# Patient Record
Sex: FEMALE | Race: WHITE | NOT HISPANIC OR LATINO | Employment: OTHER | URBAN - METROPOLITAN AREA
[De-identification: names, ages, dates, MRNs, and addresses within clinical notes are randomized per-mention and may not be internally consistent; named-entity substitution may affect disease eponyms.]

---

## 2017-02-20 ENCOUNTER — ALLSCRIPTS OFFICE VISIT (OUTPATIENT)
Dept: OTHER | Facility: OTHER | Age: 67
End: 2017-02-20

## 2017-02-20 DIAGNOSIS — M54.12 RADICULOPATHY OF CERVICAL REGION: ICD-10-CM

## 2017-03-20 ENCOUNTER — GENERIC CONVERSION - ENCOUNTER (OUTPATIENT)
Dept: OTHER | Facility: OTHER | Age: 67
End: 2017-03-20

## 2017-03-21 ENCOUNTER — GENERIC CONVERSION - ENCOUNTER (OUTPATIENT)
Dept: OTHER | Facility: OTHER | Age: 67
End: 2017-03-21

## 2017-04-07 ENCOUNTER — GENERIC CONVERSION - ENCOUNTER (OUTPATIENT)
Dept: OTHER | Facility: OTHER | Age: 67
End: 2017-04-07

## 2017-04-07 ENCOUNTER — ALLSCRIPTS OFFICE VISIT (OUTPATIENT)
Dept: OTHER | Facility: OTHER | Age: 67
End: 2017-04-07

## 2017-04-07 LAB
ERYTHROCYTE SEDIMENTATION RATE (HISTORICAL): 5 MM/HR (ref 0–40)
HBA1C MFR BLD HPLC: 5.6 % (ref 4.8–5.6)

## 2017-04-08 LAB
ANTI-NUCLEAR ANTIBODY (ANA) (HISTORICAL): NEGATIVE
RA LATEX TURBID (HISTORICAL): <10 IU/ML (ref 0–13.9)
SSA (RO) ANTIBODY (HISTORICAL): <0.2 AI (ref 0–0.9)
SSB (LA) ANTIBODY (HISTORICAL): <0.2 AI (ref 0–0.9)
VIT B12 SERPL-MCNC: 522 PG/ML (ref 211–946)

## 2017-04-10 ENCOUNTER — GENERIC CONVERSION - ENCOUNTER (OUTPATIENT)
Dept: OTHER | Facility: OTHER | Age: 67
End: 2017-04-10

## 2017-05-26 ENCOUNTER — ALLSCRIPTS OFFICE VISIT (OUTPATIENT)
Dept: OTHER | Facility: OTHER | Age: 67
End: 2017-05-26

## 2017-08-10 ENCOUNTER — ALLSCRIPTS OFFICE VISIT (OUTPATIENT)
Dept: OTHER | Facility: OTHER | Age: 67
End: 2017-08-10

## 2017-08-10 DIAGNOSIS — E04.1 NONTOXIC SINGLE THYROID NODULE: ICD-10-CM

## 2017-08-21 LAB
A/G RATIO (HISTORICAL): 1.8 (ref 1.2–2.2)
ALBUMIN SERPL BCP-MCNC: 4.1 G/DL (ref 3.6–4.8)
ALP SERPL-CCNC: 54 IU/L (ref 39–117)
ALT SERPL W P-5'-P-CCNC: 14 IU/L (ref 0–32)
AST SERPL W P-5'-P-CCNC: 21 IU/L (ref 0–40)
BASOPHILS # BLD AUTO: 0 X10E3/UL (ref 0–0.2)
BASOPHILS # BLD AUTO: 1 %
BILIRUB SERPL-MCNC: 0.4 MG/DL (ref 0–1.2)
BUN SERPL-MCNC: 21 MG/DL (ref 8–27)
BUN/CREA RATIO (HISTORICAL): 32 (ref 12–28)
CALCIUM SERPL-MCNC: 9.9 MG/DL (ref 8.7–10.3)
CHLORIDE SERPL-SCNC: 103 MMOL/L (ref 96–106)
CO2 SERPL-SCNC: 26 MMOL/L (ref 18–29)
CREAT SERPL-MCNC: 0.65 MG/DL (ref 0.57–1)
DEPRECATED RDW RBC AUTO: 15.3 % (ref 12.3–15.4)
EGFR AFRICAN AMERICAN (HISTORICAL): 109 ML/MIN/1.73
EGFR-AMERICAN CALC (HISTORICAL): 94 ML/MIN/1.73
EOSINOPHIL # BLD AUTO: 0.1 X10E3/UL (ref 0–0.4)
EOSINOPHIL # BLD AUTO: 1 %
GLUCOSE SERPL-MCNC: 92 MG/DL (ref 65–99)
HCT VFR BLD AUTO: 39.6 % (ref 34–46.6)
HGB BLD-MCNC: 13.1 G/DL (ref 11.1–15.9)
IMM.GRANULOCYTES (CD4/8) (HISTORICAL): 0 %
IMM.GRANULOCYTES (CD4/8) (HISTORICAL): 0 X10E3/UL (ref 0–0.1)
LYMPHOCYTES # BLD AUTO: 1.4 X10E3/UL (ref 0.7–3.1)
LYMPHOCYTES # BLD AUTO: 31 %
MCH RBC QN AUTO: 27.8 PG (ref 26.6–33)
MCHC RBC AUTO-ENTMCNC: 33.1 G/DL (ref 31.5–35.7)
MCV RBC AUTO: 84 FL (ref 79–97)
MONOCYTES # BLD AUTO: 0.5 X10E3/UL (ref 0.1–0.9)
MONOCYTES (HISTORICAL): 10 %
NEUTROPHILS # BLD AUTO: 2.5 X10E3/UL (ref 1.4–7)
NEUTROPHILS # BLD AUTO: 57 %
PLATELET # BLD AUTO: 238 X10E3/UL (ref 150–379)
POTASSIUM SERPL-SCNC: 4.3 MMOL/L (ref 3.5–5.2)
RBC (HISTORICAL): 4.72 X10E6/UL (ref 3.77–5.28)
SODIUM SERPL-SCNC: 143 MMOL/L (ref 134–144)
TOT. GLOBULIN, SERUM (HISTORICAL): 2.3 G/DL (ref 1.5–4.5)
TOTAL PROTEIN (HISTORICAL): 6.4 G/DL (ref 6–8.5)
WBC # BLD AUTO: 4.4 X10E3/UL (ref 3.4–10.8)

## 2017-08-22 ENCOUNTER — GENERIC CONVERSION - ENCOUNTER (OUTPATIENT)
Dept: OTHER | Facility: OTHER | Age: 67
End: 2017-08-22

## 2017-08-22 LAB
CHOLEST SERPL-MCNC: 221 MG/DL (ref 100–199)
CHOLEST/HDLC SERPL: 2.8 RATIO UNITS (ref 0–4.4)
HDLC SERPL-MCNC: 78 MG/DL
LDLC SERPL CALC-MCNC: 130 MG/DL (ref 0–99)
TRIGL SERPL-MCNC: 63 MG/DL (ref 0–149)
TSH SERPL DL<=0.05 MIU/L-ACNC: 1.98 UIU/ML (ref 0.45–4.5)
VLDLC SERPL CALC-MCNC: 13 MG/DL (ref 5–40)

## 2017-10-12 ENCOUNTER — ALLSCRIPTS OFFICE VISIT (OUTPATIENT)
Dept: OTHER | Facility: OTHER | Age: 67
End: 2017-10-12

## 2017-10-13 NOTE — PROGRESS NOTES
Assessment  1  Cerumen impaction (380 4) (H61 20)    Plan  Cerumen impaction    · Neomycin-Polymyxin-HC 3 [id number] Otic Suspension; INSTILL 3 DROPS IN  LEFT EAR 3-4 TIMES DAILY   · How to use ear drops:; Status:Complete;   Done: 36JJG2407 11:58AM   · Never put cotton swabs inside your ears ; Status:Complete;   Done: 39LPW2847 11:58AM   · To remove earwax at home, use a soft-tipped rubber ear syringe ; Status:Complete;    Done: 82XPP4020 11:58AM   · Follow-up visit in 1 week Evaluation and Treatment  Follow-up  Status: Hold For -  Scheduling  Requested for: 15ZNY8602    Chief Complaint  Patient is here today for left ear clogged and a discussionENRRIQUE/OVIDIO      History of Present Illness  Ceruminosis (Brief): The patient is being seen for an initial evaluation of cerumen impaction  The patient is currently asymptomatic  No associated symptoms are reported  The patient is not currently being treated for this problem  Pertinent medical history:  cerumen removal, but-no diabetes,-no hearing loss,-no cholesteatoma,-no eczema,-no recurrent otitis externa-and-no tympanic membrane perforation  No risk factors have been identified  Review of Systems    Constitutional: No fever, no chills, feels well, no tiredness, no recent weight gain or loss  ENT: as noted in HPI  Cardiovascular: no complaints of slow or fast heart rate, no chest pain, no palpitations, no leg claudication or lower extremity edema  Respiratory: no complaints of shortness of breath, no wheezing, no dyspnea on exertion, no orthopnea or PND  Active Problems  1  Adult BMI 34 0-34 9 kg/sq m (V85 34) (Z68 34)   2  Arthritis (716 90) (M19 90)   3  Cervical radiculopathy (723 4) (M54 12)   4  Chronic pain of left knee (519 60,598 15) (M25 562,G89 29)   5  Congenital pes planus (754 61) (Q66 50)   6  Dysphagia, unspecified dysphagia   7  Enthesopathy of lower extremity (726 8) (M76 9)   8  Generalized muscle weakness (728 87) (M62 81)   9  Hypertension (401 9) (I10)   10  Osteoarthritis of cervical spine (721 0) (M47 812)   11  Osteoarthritis of left knee (715 96) (M17 12)   12  Overweight (278 02) (E66 3)   13  Paresthesia (782 0) (R20 2)   14  Pes planus of both feet (734) (M21 41,M21 42)   15  Plantar fasciitis (728 71) (M72 2)   16  Sciatica of left side (724 3) (M54 32)   17  Screening for colorectal cancer (V76 51) (Z12 11,Z12 12)   18  Thyroid cyst (246 2) (E04 1)   19  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)    Past Medical History  1  History of Cat bite of forearm (881 00,E906 3) (S51 859A,W55 01XA)   2  History of Difficulty in walking (719 7) (R26 2)   3  History of Enthesopathy of lower extremity (726 8) (M76 9)   4  Flu vaccine need (V04 81) (Z23)   5  History of Foot pain (729 5) (M79 673)   6  History of dizziness (V13 89) (Z87 898)   7  History of weakness (V13 89) (Z87 898)   8  Denied: History of Impacted cerumen of left ear   9  History of Peroneal tendonitis (726 79) (M76 70)   10  History of Spondylosis of cervical region without myelopathy or radiculopathy (721 0)    (M47 812)   11  History of Verruca plana (078 19) (B07 8)  Active Problems And Past Medical History Reviewed: The active problems and past medical history were reviewed and updated today  Family History  Mother    1  Family history of arthritis (V17 7) (Z82 61)   2  Family history of malignant neoplasm (V16 9) (Z80 9)   3  Family history of malignant neoplasm of esophagus (V16 0) (Z80 0)  Father    4  Family history of lung cancer (V16 1) (Z80 1)   5  Family history of malignant neoplasm (V16 9) (Z80 9)  Family History    6  Denied: FH: mental illness  Family History Reviewed: The family history was reviewed and updated today         Social History   · Active advance directive (V49 89) (Z78 9)   · Caffeine use (V49 89) (F15 90)   · Dental care, regularly   · Drinks coffee   · Never a smoker   · No alcohol use   · No drug use   · Non-smoker (V49 89) (Z78 9)   · Primary language is English  The social history was reviewed and updated today  Surgical History  1  History of Laminectomy Lumbar  Surgical History Reviewed: The surgical history was reviewed and updated today  Current Meds   1  B-12 1000 MCG Sublingual Tablet Sublingual;   Therapy: (Recorded:10Aug2017) to Recorded   2  Biotin 5000 MCG Oral Capsule; Therapy: (Recorded:74Cpq7174) to Recorded   3  Gabapentin 300 MG TABS; TAKE 1 TABLET DAILY; Therapy: (Recorded:10Aug2017) to Recorded   4  HydroCHLOROthiazide 12 5 MG Oral Capsule; TAKE ONE CAPSULE BY MOUTH   EVERY DAY IN THE MORNING; Therapy: 16PTI3355 to (Evaluate:27Rle3260)  Requested for: 97YTN5448; Last   Rx:72Ily0720 Ordered   5  Losartan Potassium 100 MG Oral Tablet; 1 EVERY MORNING; Therapy: 65NVY2315 to (Last Rx:66Syq6306)  Requested for: 72Czr2396 Ordered   6  Ocuvite TABS; TAKE 1 TABLET DAILY; Therapy: (Selina Ardon) to Recorded   7  Pantoprazole Sodium 40 MG Oral Tablet Delayed Release; TAKE 1 TABLET 30   MINUTES BEFORE BREAKFAST DAILY; Therapy: 55Yeg7060 to Recorded   8  Vitamin D 2000 UNIT Oral Tablet; Take 1 tablet daily; Therapy: (Recorded:29Jun2015) to Recorded    The medication list was reviewed and updated today  Allergies  1  Augmentin   2  erythromycin  3  No Known Environmental Allergies   4  No Known Food Allergies  Denied    5  amoxicillin    Vitals   Recorded: 29EOY2218 11:15AM   Temperature 97 7 F, Temporal   Heart Rate 60, L Radial   Pulse Quality Normal, L Radial   Respiration Quality Normal   Respiration 16   Systolic 740, LUE, Sitting   Diastolic 80, LUE, Sitting   Height 5 ft 2 5 in   Weight 182 lb    BMI Calculated 32 76   BSA Calculated 1 85   O2 Saturation 97     Physical Exam    Constitutional   General appearance: No acute distress, well appearing and well nourished      Ears, Nose, Mouth, and Throat   External inspection of ears and nose: Normal     Otoscopic examination: Abnormal  -Cerumen impaction left ear canal    Nasal mucosa, septum, and turbinates: Normal without edema or erythema  Pulmonary   Respiratory effort: No increased work of breathing or signs of respiratory distress  Results/Data  Falls Risk Assessment (Dx Z13 89 Screen for Neurologic Disorder) 12Oct2017 11:22AM User, [employer]     Test Name Result Flag Reference   Falls Risk      No falls in the past year     PHQ-2 Adult Depression Screening 12Oct2017 11:20AM User, [employer]     Test Name Result Flag Reference   PHQ-2 Adult Depression Score 2     Over the last two weeks, how often have you been bothered by any of the following problems?   Little interest or pleasure in doing things: Several days - 1  Feeling down, depressed, or hopeless: Several days - 1   PHQ-2 Adult Depression Screening Negative         Signatures   Electronically signed by : Randee Jones MD; Oct 12 2017 12:00PM EST                       (Author)

## 2017-10-20 ENCOUNTER — GENERIC CONVERSION - ENCOUNTER (OUTPATIENT)
Dept: OTHER | Facility: OTHER | Age: 67
End: 2017-10-20

## 2017-11-22 ENCOUNTER — ALLSCRIPTS OFFICE VISIT (OUTPATIENT)
Dept: OTHER | Facility: OTHER | Age: 67
End: 2017-11-22

## 2017-11-22 LAB
BILIRUB UR QL STRIP: NORMAL
CLARITY UR: NORMAL
COLOR UR: YELLOW
GLUCOSE (HISTORICAL): NORMAL
HGB UR QL STRIP.AUTO: NORMAL
KETONES UR STRIP-MCNC: NORMAL MG/DL
LEUKOCYTE ESTERASE UR QL STRIP: NORMAL
NITRITE UR QL STRIP: NORMAL
PH UR STRIP.AUTO: 7 [PH]
PROT UR STRIP-MCNC: NORMAL MG/DL
SP GR UR STRIP.AUTO: 1.01
UROBILINOGEN UR QL STRIP.AUTO: NORMAL

## 2017-11-23 NOTE — PROGRESS NOTES
Assessment    1  Atrophic vaginitis (627 3) (N95 2)    Plan  Atrophic vaginitis    · Follow-up visit in 3 months Evaluation and Treatment  Follow-up  Status: Hold For -Scheduling  Requested for: 83QYC4511   · Obstetrics/Gynecology Follow Up Evaluation and Treatment  Follow-up  Status: Hold For -Scheduling  Requested for: 27FHO0151   · Eat foods that are high in calcium ; Status:Complete;   Done: 42KAH9513 10:26AM   · Use a water soluble lubricant to make sexual intercourse comfortable ; Status:Complete;  Done: 45ONA8064 10:26AM   · Call [phone] if: You have any bleeding from the vagina ; Status:Complete;  Done: 56HQW4289 10:26AM  Need for pneumococcal vaccination    · Prevnar 13 Intramuscular Suspension    Chief Complaint  Patient is here today for uti symptoms, L Nobles/LPN      History of Present Illness  HPI: Patient presents with urethral irritation  Review of Systems   Constitutional: No fever, no chills, feels well, no tiredness, no recent weight gain or loss  Cardiovascular: no complaints of slow or fast heart rate, no chest pain, no palpitations, no leg claudication or lower extremity edema  Genitourinary: as noted in HPI  Active Problems  1  Adult BMI 34 0-34 9 kg/sq m (V85 34) (Z68 34)   2  Arthritis (716 90) (M19 90)   3  Cerumen impaction (380 4) (H61 20)   4  Cervical radiculopathy (723 4) (M54 12)   5  Chronic pain of left knee (160 40,814 01) (M25 562,G89 29)   6  Congenital pes planus (754 61) (Q66 50)   7  Dysphagia, unspecified dysphagia   8  Enthesopathy of lower extremity (726 8) (M76 9)   9  Generalized muscle weakness (728 87) (M62 81)   10  Hypertension (401 9) (I10)   11  Denied: History of Impacted cerumen of left ear   12  Osteoarthritis of cervical spine (721 0) (M47 812)   13  Osteoarthritis of left knee (715 96) (M17 12)   14  Overweight (278 02) (E66 3)   15  Paresthesia (782 0) (R20 2)   16  Pes planus of both feet (734) (M21 41,M21 42)   17   Plantar fasciitis (728 71) (M72 2)   18  Sciatica of left side (724 3) (M54 32)   19  Screening for colorectal cancer (V76 51) (Z12 11,Z12 12)   20  Thyroid cyst (246 2) (E04 1)   21  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)    Past Medical History    1  History of Cat bite of forearm (881 00,E906 3) (S51 859A,W55 01XA)   2  History of Difficulty in walking (719 7) (R26 2)   3  History of Enthesopathy of lower extremity (726 8) (M76 9)   4  Flu vaccine need (V04 81) (Z23)   5  History of Foot pain (729 5) (M79 673)   6  History of dizziness (V13 89) (Z87 898)   7  History of impacted cerumen (V12 49) (Z86 69)   8  History of weakness (V13 89) (Z87 898)   9  Denied: History of Impacted cerumen of left ear   10  History of Peroneal tendonitis (726 79) (M76 70)   11  History of Spondylosis of cervical region without myelopathy or radiculopathy (721 0)  (M47 812)   12  History of Verruca plana (078 19) (B07 8)  Active Problems And Past Medical History Reviewed: The active problems and past medical history were reviewed and updated today  Family History  Mother    1  Family history of arthritis (V17 7) (Z82 61)   2  Family history of malignant neoplasm (V16 9) (Z80 9)   3  Family history of malignant neoplasm of esophagus (V16 0) (Z80 0)  Father    4  Family history of lung cancer (V16 1) (Z80 1)   5  Family history of malignant neoplasm (V16 9) (Z80 9)  Family History    6  Denied: Family history of substance abuse   7  Denied: FH: mental illness  Family History Reviewed: The family history was reviewed and updated today  Social History   · Active advance directive (V49 89) (Z78 9)   · Caffeine use (V49 89) (F15 90)   · Dental care, regularly   · Drinks coffee   · Never a smoker   · No alcohol use   · No drug use   · Non-smoker (V49 89) (Z78 9)   · Primary language is English  The social history was reviewed and updated today  Surgical History    1   History of Laminectomy Lumbar  Surgical History Reviewed: The surgical history was reviewed and updated today  Current Meds   1  B-12 1000 MCG Sublingual Tablet Sublingual; Therapy: (Recorded:10Aug2017) to Recorded   2  Biotin 5000 MCG Oral Capsule; Therapy: (Recorded:24Gdh7343) to Recorded   3  Gabapentin 300 MG TABS; TAKE 1 TABLET DAILY; Therapy: (Recorded:10Aug2017) to Recorded   4  HydroCHLOROthiazide 12 5 MG Oral Capsule; TAKE ONE CAPSULE BY MOUTH EVERY DAY IN THE MORNING; Therapy: 18OQS3482 to (Evaluate:11Feb2018)  Requested for: 55JZE9027; Last Rx:30Fby3605 Ordered   5  Losartan Potassium 100 MG Oral Tablet; 1 EVERY MORNING; Therapy: 94ZHM7595 to (Last Rx:74Fgx4633)  Requested for: 11Tlx4646 Ordered   6  Neomycin-Polymyxin-HC 3 5-76115-0 Otic Suspension; INSTILL 3 DROPS IN LEFT EAR 3-4 TIMES DAILY; Therapy: 85SQE4748 to (Evaluate:17Oct2017)  Requested for: 17XTZ7916; Last Rx:12Oct2017 Ordered   7  Ocuvite TABS; TAKE 1 TABLET DAILY; Therapy: (Marcos Del Toro) to Recorded   8  Pantoprazole Sodium 40 MG Oral Tablet Delayed Release; TAKE 1 TABLET 30 MINUTES BEFORE BREAKFAST DAILY; Therapy: 24Apr2014 to Recorded   9  Vitamin D 2000 UNIT Oral Tablet; Take 1 tablet daily; Therapy: (Recorded:29Jun2015) to Recorded    The medication list was reviewed and updated today  Allergies  1  Augmentin   2  erythromycin  3  No Known Environmental Allergies   4  No Known Food Allergies  Denied    5  amoxicillin    Vitals   Recorded: 22Nov2017 09:54AM   Temperature 98 5 F, Temporal   Heart Rate 60, R Radial   Pulse Quality Normal, R Radial   Respiration Quality Normal   Respiration 16   Systolic 370, LUE, Sitting   Diastolic 80, LUE, Sitting   Height 5 ft 2 5 in   Weight 174 lb    BMI Calculated 31 32   BSA Calculated 1 81   O2 Saturation 98       Physical Exam   Constitutional  General appearance: No acute distress, well appearing and well nourished  Pulmonary  Respiratory effort: No increased work of breathing or signs of respiratory distress     Neurologic Cranial nerves: Cranial nerves 2-12 intact  Psychiatric  Orientation to person, place, and time: Normal    Mood and affect: Normal    Additional Exam:  No CVAT  U/A negative          Signatures   Electronically signed by : Ted Gu MD; Nov 22 2017 10:28AM EST                       (Author)

## 2018-01-09 ENCOUNTER — ALLSCRIPTS OFFICE VISIT (OUTPATIENT)
Dept: OTHER | Facility: OTHER | Age: 68
End: 2018-01-09

## 2018-01-09 NOTE — RESULT NOTES
Verified Results  (1) CBC/PLT/DIFF 80Qfw9028 07:55AM Meldon Emperatriz     Test Name Result Flag Reference   WBC 4 4 x10E3/uL  3 4-10 8   RBC 4 72 x10E6/uL  3 77-5 28   Hemoglobin 13 1 g/dL  11 1-15 9   Hematocrit 39 6 %  34 0-46  6   MCV 84 fL  79-97   MCH 27 8 pg  26 6-33 0   MCHC 33 1 g/dL  31 5-35 7   RDW 15 3 %  12 3-15 4   Platelets 957 Y16X7/ZF  150-379   Neutrophils 57 %     Lymphs 31 %     Monocytes 10 %     Eos 1 %     Basos 1 %     Neutrophils (Absolute) 2 5 x10E3/uL  1 4-7 0   Lymphs (Absolute) 1 4 x10E3/uL  0 7-3 1   Monocytes(Absolute) 0 5 x10E3/uL  0 1-0 9   Eos (Absolute) 0 1 x10E3/uL  0 0-0 4   Baso (Absolute) 0 0 x10E3/uL  0 0-0 2   Immature Granulocytes 0 %     Immature Grans (Abs) 0 0 x10E3/uL  0 0-0 1     (1) COMPREHENSIVE METABOLIC PANEL 07PHX6589 72:63ON Meldon Emperatriz     Test Name Result Flag Reference   Glucose, Serum 92 mg/dL  65-99   BUN 21 mg/dL  8-27   Creatinine, Serum 0 65 mg/dL  0 57-1 00   BUN/Creatinine Ratio 32 H 12-28   Sodium, Serum 143 mmol/L  134-144   Potassium, Serum 4 3 mmol/L  3 5-5 2   Chloride, Serum 103 mmol/L     Carbon Dioxide, Total 26 mmol/L  18-29   Calcium, Serum 9 9 mg/dL  8 7-10 3   Protein, Total, Serum 6 4 g/dL  6 0-8 5   Albumin, Serum 4 1 g/dL  3 6-4 8   Globulin, Total 2 3 g/dL  1 5-4 5   A/G Ratio 1 8  1 2-2 2   Bilirubin, Total 0 4 mg/dL  0 0-1 2   Alkaline Phosphatase, S 54 IU/L     AST (SGOT) 21 IU/L  0-40   ALT (SGPT) 14 IU/L  0-32   eGFR If NonAfricn Am 94 mL/min/1 73  >59   eGFR If Africn Am 109 mL/min/1 73  >59     (1) LIPID PANEL, FASTING 59Nso1145 07:55AM Stephaniepedro HazelEmperatriz     Test Name Result Flag Reference   Cholesterol, Total 221 mg/dL H 100-199   Triglycerides 63 mg/dL  0-149   HDL Cholesterol 78 mg/dL  >39   VLDL Cholesterol Rick 13 mg/dL  5-40   LDL Cholesterol Calc 130 mg/dL H 0-99   T  Chol/HDL Ratio 2 8 ratio units  0 0-4 4   T   Chol/HDL Ratio                                                             Men  Women 1/2 Avg  Risk  3 4    3 3                                                   Avg Risk  5 0    4 4                                                2X Avg  Risk  9 6    7 1                                                3X Avg  Risk 23 4   11 0     (1) TSH 12Hij6666 07:55AM Brennon Henderson     Test Name Result Flag Reference   TSH 1 980 uIU/mL  0 450-4 500

## 2018-01-10 NOTE — RESULT NOTES
Message   Please notify lyme neg  FS     Verified Results  (LC) Lyme, Western Blot, Serum 96Vtl7880 11:17AM Clotilda Lack     Test Name Result Flag Reference   IgG P93 Ab  Absent     IgG P66 Ab  Absent     IgG P58 Ab  Absent     IgG P45 Ab  Absent     IgG P41 Ab  Present A    IgG P39 Ab  Absent     IgG P30 Ab  Present A    IgG P28 Ab  Absent     IgG P23 Ab  Absent     IgG P18 Ab  Absent     Lyme IgG WB Interp  Negative     Positive: 5 of the following                                                Borrelia-specific bands:                                                18,23,28,30,39,41,45,58,                                                66, and 93  Negative: No bands or banding                                                patterns which do not                                                meet positive criteria  IgM P41 Ab  Absent     IgM P39 Ab  Absent     IgM P23 Ab  Absent     Lyme IgM WB Interp  Negative     Note: An equivocal or positive EIA result followed by a negative  Western Blot result is considered NEGATIVE  An equivocal or positive  EIA result followed by a positive Western Blot is considered POSITIVE  by the CDC  Positive: 2 of the following bands: 23,39 or 41  Negative: No bands or banding patterns which do not meet positive  criteria  Criteria for positivity are those recommended by CDC/ASTPHLD   p23=Osp C, u18=cosafoqyi  Note:  Sera from individuals with the following may cross react in the  Lyme Western Blot assays: other spirochetal diseases (periodontal  disease, leptospirosis, relapsing fever, yaws, and pinta);  connective autoimmune (Rheumatoid Arthritis and Systemic Lupus  Erythematosus and also individuals with Antinuclear Antibody);  other infections COFFEE [facility] Spotted Fever; Jean-Pierre-Barr Virus,  and Cytomegalovirus)

## 2018-01-10 NOTE — PROGRESS NOTES
Assessment   1  Cervical radiculopathy (723 4) (M54 12)    Plan   Cervical radiculopathy    · Follow-up visit in 2 months Evaluation and Treatment  Follow-up  Status: Hold For -    Scheduling  Requested for: 74MQU1622   · After 3 days, apply heat 4 times a day for 20 minutes ; Status:Complete;   Done:    03DNV3941 02:53PM   · Rub ice on the affected area 4 times a day for 15 minutes for the first 2 days ;    Status:Complete;   Done: 52ZLA7891 02:53PM   · Call [phone] if: The pain is not better in 1 week ; Status:Complete;   Done:    77ETO4973 02:53PM   · Call [phone] if: The symptoms are not better in 7 days ; Status:Complete;   Done:    77STJ5630 02:53PM   · Call [phone] if: You get a rash ; Status:Complete;   Done: 53PAJ7097 02:53PM   · Seek Immediate Medical Attention if: Your arm becomes weak ; Status:Complete;      Done: 41WPB0128 02:53PM   · Seek Immediate Medical Attention if: Your arm is numb, cold, or tingling ;    Status:Complete;   Done: 16DWC2533 02:53PM    Chief Complaint   Patient is here today for burning in her arms and hands after getting shots in her cervical spine  Also she had a lapse in her memory, L  Nobles/LPN      History of Present Illness   Radiculopathy, Cervical (Brief): The patient is being seen for a routine clinic follow-up of cervical radiculopathy  This condition is not related to a specific injury  Past evaluation has included neck MRI  Past treatment has included systemic corticosteroids  The patient denies having recent interventions  Symptoms:  neck pain  The patient is currently asymptomatic  Current treatment includes Trigger point injections  By report there is good compliance with treatment  Review of Systems        Constitutional: No fever, no chills, feels well, no tiredness, no recent weight gain or loss  Musculoskeletal: as noted in HPI        Neurological: no complaints of headache, no confusion, no numbness or tingling, no dizziness or fainting  Active Problems   1  Adult BMI 34 0-34 9 kg/sq m (V85 34) (Z68 34)   2  Arthritis (716 90) (M19 90)   3  Atrophic vaginitis (627 3) (N95 2)   4  Cerumen impaction (380 4) (H61 20)   5  Cervical radiculopathy (723 4) (M54 12)   6  Chronic pain of left knee (328 16,562 54) (M25 562,G89 29)   7  Congenital pes planus (754 61) (Q66 50)   8  Dysphagia, unspecified dysphagia   9  Enthesopathy of lower extremity (726 8) (M76 9)   10  Generalized muscle weakness (728 87) (M62 81)   11  Hypertension (401 9) (I10)   12  Denied: History of Impacted cerumen of left ear   13  Need for pneumococcal vaccination (V03 82) (Z23)   14  Osteoarthritis of cervical spine (721 0) (M47 812)   15  Osteoarthritis of left knee (715 96) (M17 12)   16  Overweight (278 02) (E66 3)   17  Paresthesia (782 0) (R20 2)   18  Pes planus of both feet (734) (M21 41,M21 42)   19  Plantar fasciitis (728 71) (M72 2)   20  Sciatica of left side (724 3) (M54 32)   21  Screening for colorectal cancer (V76 51) (Z12 11,Z12 12)   22  Thyroid cyst (246 2) (E04 1)   23  UTI symptoms (788 99) (R39 9)   24  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)    Past Medical History   1  History of Cat bite of forearm (881 00,E906 3) (S51 859A,W55 01XA)   2  History of Difficulty in walking (719 7) (R26 2)   3  History of Enthesopathy of lower extremity (726 8) (M76 9)   4  Flu vaccine need (V04 81) (Z23)   5  History of Foot pain (729 5) (M79 673)   6  History of dizziness (V13 89) (Z87 898)   7  History of impacted cerumen (V12 49) (Z86 69)   8  History of weakness (V13 89) (Z87 898)   9  Denied: History of Impacted cerumen of left ear   10  History of Peroneal tendonitis (726 79) (M76 70)   11  History of Spondylosis of cervical region without myelopathy or radiculopathy (721 0)      (M47 812)   12  History of Verruca plana (250 19) (B07 8)  Active Problems And Past Medical History Reviewed:     The active problems and past medical history were reviewed and updated today  Family History   Mother    1  Family history of arthritis (V17 7) (Z82 61)   2  Family history of malignant neoplasm (V16 9) (Z80 9)   3  Family history of malignant neoplasm of esophagus (V16 0) (Z80 0)  Father    4  Family history of lung cancer (V16 1) (Z80 1)   5  Family history of malignant neoplasm (V16 9) (Z80 9)  Family History    6  Denied: Family history of substance abuse   7  Denied: FH: mental illness  Family History Reviewed: The family history was reviewed and updated today  Social History    · Active advance directive (V49 89) (Z78 9)   · Caffeine use (V49 89) (F15 90)   · Dental care, regularly   · Drinks coffee   · Never a smoker   · No alcohol use   · No drug use   · Non-smoker (V49 89) (Z78 9)   · Primary language is English  The social history was reviewed and updated today  Surgical History   1  History of Laminectomy Lumbar  Surgical History Reviewed: The surgical history was reviewed and updated today  Current Meds    1  B-12 1000 MCG Sublingual Tablet Sublingual;     Therapy: (Recorded:10Aug2017) to Recorded   2  Gabapentin 300 MG TABS; Take 1 tablet twice daily; Therapy: (Recorded:09Jan2018) to Recorded   3  HydroCHLOROthiazide 12 5 MG Oral Capsule; TAKE ONE CAPSULE BY MOUTH     EVERY DAY IN THE MORNING; Therapy: 14HLP7108 to (Evaluate:73Vrp7282)  Requested for: 35JNE5851; Last     Rx:66Uap2923 Ordered   4  Losartan Potassium 100 MG Oral Tablet; 1 EVERY MORNING; Therapy: 84UMZ3677 to (Last Rx:59Wix7424)  Requested for: 72Xky9658 Ordered   5  Neomycin-Polymyxin-HC 3 5-33639-1 Otic Suspension; INSTILL 3 DROPS IN LEFT     EAR 3-4 TIMES DAILY; Therapy: 24QTV0883 to (Evaluate:17Oct2017)  Requested for: 34JPC8347; Last     Rx:12Oct2017 Ordered   6  Ocuvite TABS; TAKE 1 TABLET DAILY; Therapy: (Candi Mercado) to Recorded   7   Pantoprazole Sodium 40 MG Oral Tablet Delayed Release; TAKE 1 TABLET 30 MINUTES BEFORE BREAKFAST DAILY; Therapy: 24Apr2014 to Recorded   8  Vitamin D 2000 UNIT Oral Tablet; Take 1 tablet daily; Therapy: (Recorded:29Jun2015) to Recorded     The medication list was reviewed and updated today  Allergies   1  amoxicillin   2  Augmentin   3  erythromycin  4  No Known Environmental Allergies   5  No Known Food Allergies    Vitals    Recorded: 45OBX4285 02:22PM   Temperature 98 1 F, Temporal   Heart Rate 70, R Radial   Pulse Quality Normal, R Radial   Respiration Quality Normal   Respiration 16   Systolic 178, LUE, Sitting   Diastolic 80, LUE, Sitting   Height 5 ft 2 in   Weight 160 lb    BMI Calculated 29 26   BSA Calculated 1 74   O2 Saturation 98     Physical Exam        Constitutional      General appearance: No acute distress, well appearing and well nourished  Musculoskeletal      Gait and station: Normal        Digits and nails: Normal without clubbing or cyanosis  Inspection/palpation of joints, bones, and muscles: Normal        Neurologic      Reflexes: 2+ and symmetric  Sensation: No sensory loss         Psychiatric      Orientation to person, place, and time: Normal        Mood and affect: Normal           Signatures    Electronically signed by : Guru Doll MD; Jan 9 2018  2:55PM EST                       (Author)

## 2018-01-10 NOTE — PROGRESS NOTES
Assessment    1  Thyroid cyst (246 2) (E04 1)   2  Encounter for preventive health examination (V70 0) (Z00 00)   3  History of Spondylosis of cervical region without myelopathy or radiculopathy (721 0)   (M47 812)   4  Adult BMI 34 0-34 9 kg/sq m (V85 34) (Z68 34)    Plan  Cervical radiculopathy, Congenital pes planus, Health Maintenance, Generalized muscle  weakness    · (1) CBC/PLT/DIFF; Status:Active; Requested for:04Nsm1712; Health Maintenance    · (1) COMPREHENSIVE METABOLIC PANEL; Status:Active; Requested for:95Tgz8334;    · Follow-up visit in 1 year Evaluation and Treatment  Follow-up  Status: Hold For -  Scheduling  Requested for: 74ZUP5210   · Always use a seat belt and shoulder strap when riding or driving a motor vehicle ;  Status:Complete;   Done: 98TCK0795 01:49PM   · Begin a limited exercise program ; Status:Complete;   Done: 13KQR9986 01:49PM   · Use a sun block product with an SPF of 15 or more ; Status:Complete;   Done:  72EMN7288 01:49PM   · We recommend that you follow the "Mediterranean diet "; Status:Complete;   Done:  73DTJ6459 01:49PM  Health Maintenance, Hypertension    · (1) LIPID PANEL, FASTING; Status:Active; Requested for:10Aug2017; Health Maintenance, Thyroid cyst    · (1) TSH; Status:Active; Requested for:10Aug2017; Thyroid cyst    · US THYROID; Status:Hold For - Scheduling; Requested for:10Aug2017;     Discussion/Summary  health maintenance visit Currently, she eats a healthy diet  cervical cancer screening is current Breast cancer screening: mammogram is current  Colorectal cancer screening: colorectal cancer screening is current  Osteoporosis screening: bone mineral density testing is not indicated  Screening lab work includes hemoglobin, glucose, lipid profile and thyroid function testing  The immunizations are up to date  Advice and education were given regarding aerobic exercise, sunscreen use and seat belt use        Chief Complaint  Patient is here today for her annual physical exam, L  Nobles/LPN      History of Present Illness  HM, Adult Female: The patient is being seen for a health maintenance evaluation  The last health maintenance visit was 12 month(s) ago  Social History: She is unmarried  Work status: Retired  The patient has never smoked cigarettes  She reports never drinking alcohol  She has never used illicit drugs  General Health: The patient's health since the last visit is described as good  She has regular dental visits  She denies vision problems  She denies hearing loss  Immunizations status: up to date  Lifestyle:  She consumes a diverse and healthy diet  She does not exercise regularly  She does not use tobacco  She denies alcohol use  She denies drug use  Reproductive health: the patient is postmenopausal    Screening: Cervical cancer screening includes a pap smear performed last year  Colorectal cancer screening includes a colonoscopy performed within the past ten years  Metabolic screening includes lipid profile performed within the past five years, glucose screening performed last year and thyroid function test performed last year  Cardiovascular risk factors: hypertension, but no diabetes, no high LDL cholesterol, no low HDL cholesterol, no stress, no obesity, no tobacco use, no illicit drug use and no sedentary lifestyle  General health risks: no previous breast cancer, no previous colon polyps, no inflammatory bowel disease, no osteoporosis risk factors, no asbestos exposure and no radiation exposure  Safety elements used: seat belt, smoke detector and carbon monoxide detector  Review of Systems    Constitutional: No fever, no chills, feels well, no tiredness, no recent weight gain or weight loss  Eyes: No complaints of eye pain, no red eyes, no eyesight problems, no discharge, no dry eyes, no itching of eyes  ENT: no complaints of earache, no loss of hearing, no nose bleeds, no nasal discharge, no sore throat, no hoarseness  Cardiovascular: No complaints of slow heart rate, no fast heart rate, no chest pain, no palpitations, no leg claudication, no lower extremity edema  Respiratory: No complaints of shortness of breath, no wheezing, no cough, no SOB on exertion, no orthopnea, no PND  Gastrointestinal: No complaints of abdominal pain, no constipation, no nausea or vomiting, no diarrhea, no bloody stools  Genitourinary: No complaints of dysuria, no incontinence, no pelvic pain, no dysmenorrhea, no vaginal discharge or bleeding  Musculoskeletal: No complaints of arthralgias, no myalgias, no joint swelling or stiffness, no limb pain or swelling  Integumentary: No complaints of skin rash or lesions, no itching, no skin wounds, no breast pain or lump  Neurological: No complaints of headache, no confusion, no convulsions, no numbness, no dizziness or fainting, no tingling, no limb weakness, no difficulty walking  Psychiatric: Not suicidal, no sleep disturbance, no anxiety or depression, no change in personality, no emotional problems  Endocrine: No complaints of proptosis, no hot flashes, no muscle weakness, no deepening of the voice, no feelings of weakness  Hematologic/Lymphatic: No complaints of swollen glands, no swollen glands in the neck, does not bleed easily, does not bruise easily  Active Problems    1  Arthritis (716 90) (M19 90)   2  Cervical radiculopathy (723 4) (M54 12)   3  Chronic pain of left knee (792 19,673 48) (M25 562,G89 29)   4  Congenital pes planus (754 61) (Q66 50)   5  Dysphagia, unspecified dysphagia   6  Enthesopathy of lower extremity (726 8) (M76 9)   7  Generalized muscle weakness (728 87) (M62 81)   8  Hypertension (401 9) (I10)   9  Osteoarthritis of cervical spine (721 0) (M47 812)   10  Osteoarthritis of left knee (715 96) (M17 12)   11  Overweight (278 02) (E66 3)   12  Paresthesia (782 0) (R20 2)   13  Pes planus of both feet (734) (M21 41,M21 42)   14   Plantar fasciitis (728 71) (M72 2)   15  Sciatica of left side (724 3) (M54 32)   16  Screening for colorectal cancer (V76 51) (Z12 11,Z12 12)   17  Thyroid cyst (246 2) (E04 1)   18  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)    Past Medical History    · History of Cat bite of forearm (881 00,E906 3) (S51 859A,W55 01XA)   · History of Difficulty in walking (719 7) (R26 2)   · History of Enthesopathy of lower extremity (726 8) (M76 9)   · History of Foot pain (729 5) (M79 673)   · History of dizziness (V13 89) (F00 312)   · History of weakness (V13 89) (P50 208)   · History of Peroneal tendonitis (726 79) (M76 70)   · History of Spondylosis of cervical region without myelopathy or radiculopathy (721 0)  (M47 812)   · History of Verruca plana (078 19) (B07 8)    Surgical History    · History of Laminectomy Lumbar    Family History  Mother    · Family history of arthritis (V17 7) (Z82 61)   · Family history of malignant neoplasm (V16 9) (Z80 9)   · Family history of malignant neoplasm of esophagus (V16 0) (Z80 0)  Father    · Family history of lung cancer (V16 1) (Z80 1)   · Family history of malignant neoplasm (V16 9) (Z80 9)  Family History    · Denied: FH: mental illness    Social History    · Caffeine use (V49 89) (F15 90)   · Dental care, regularly   · Drinks coffee   · Never a smoker   · No alcohol use   · No drug use   · Non-smoker (V49 89) (Z78 9)   · Primary language is English    Current Meds   1  B-12 1000 MCG Sublingual Tablet Sublingual;   Therapy: (Recorded:99Cpv7777) to Recorded   2  Biotin 5000 MCG Oral Capsule; Therapy: (Recorded:09Yoh5753) to Recorded   3  Gabapentin 300 MG TABS; TAKE 1 TABLET DAILY; Therapy: (Recorded:69Kgx1013) to Recorded   4  HydroCHLOROthiazide 12 5 MG Oral Capsule; TAKE ONE CAPSULE BY MOUTH   EVERY DAY IN THE MORNING; Therapy: 36XPS0166 to (Evaluate:02Ohn8874)  Requested for: 61SLI4667; Last   Rx:17Svc9952 Ordered   5   Losartan Potassium 100 MG Oral Tablet; 1 EVERY MORNING; Therapy: 88TXE0819 to (Last Rx:22Wds1519)  Requested for: 25Oup6275 Ordered   6  Ocuvite TABS; TAKE 1 TABLET DAILY; Therapy: (Sarah Luna) to Recorded   7  Pantoprazole Sodium 40 MG Oral Tablet Delayed Release; TAKE 1 TABLET 30   MINUTES BEFORE BREAKFAST DAILY; Therapy: 24Apr2014 to Recorded   8  Vitamin D 2000 UNIT Oral Tablet; Take 1 tablet daily; Therapy: (Recorded:29Jun2015) to Recorded    Allergies    1  Augmentin   2  erythromycin    3  No Known Environmental Allergies   4  No Known Food Allergies  Denied    5  amoxicillin    Vitals   Recorded: 10Aug2017 01:02PM   Temperature 97 8 F, Temporal   Heart Rate 74, R Radial   Pulse Quality Normal, R Radial   Respiration Quality Normal   Respiration 16   Systolic 735, LUE, Sitting   Diastolic 68, LUE, Sitting   Height 5 ft 3 5 in   Weight 197 lb    BMI Calculated 34 35   BSA Calculated 1 93   O2 Saturation 97     Physical Exam    Constitutional   General appearance: No acute distress, well appearing and well nourished  Eyes   Conjunctiva and lids: No swelling, erythema or discharge  Pupils and irises: Equal, round, reactive to light  Ophthalmoscopic examination: Normal fundi and optic discs  Ears, Nose, Mouth, and Throat   External inspection of ears and nose: Normal     Otoscopic examination: Tympanic membranes translucent with normal light reflex  Canals patent without erythema  Hearing: Normal     Nasal mucosa, septum, and turbinates: Normal without edema or erythema  Lips, teeth, and gums: Normal, good dentition  Oropharynx: Normal with no erythema, edema, exudate or lesions  Neck   Neck: Supple, symmetric, trachea midline, no masses  Thyroid: Normal, no thyromegaly  Pulmonary   Respiratory effort: No increased work of breathing or signs of respiratory distress  Auscultation of lungs: Clear to auscultation  Cardiovascular   Auscultation of heart: Normal rate and rhythm, normal S1 and S2, no murmurs      Carotid pulses: 2+ bilaterally  Abdominal aorta: Normal     Examination of extremities for edema and/or varicosities: Abnormal   Varicosities  Abdomen   Abdomen: Non-tender, no masses  Liver and spleen: No hepatomegaly or splenomegaly  Lymphatic   Palpation of lymph nodes in neck: No lymphadenopathy  Musculoskeletal   Gait and station: Normal     Digits and nails: Normal without clubbing or cyanosis  Joints, bones, and muscles: Normal     Range of motion: Normal     Stability: Normal     Muscle strength/tone: Normal     Skin   Skin and subcutaneous tissue: Normal without rashes or lesions  Palpation of skin and subcutaneous tissue: Normal turgor  Neurologic   Cranial nerves: Cranial nerves II-XII intact  Reflexes: 2+ and symmetric  Sensation: No sensory loss  Psychiatric   Judgment and insight: Normal     Orientation to person, place, and time: Normal     Recent and remote memory: Intact  Mood and affect: Normal        Health Management  Screening for colorectal cancer   COLONOSCOPY; every 7 years; Last 23Apr2014; Next Due: 76WEM4706; Active  Health Maintenance   * MAMMO SCREENING BILATERAL W CAD; every 1 year; Last 89VOR9079; Next Due:  21Mar2018;  Active    Signatures   Electronically signed by : Gualberto Delgado MD; Aug 10 2017  1:55PM EST                       (Author)

## 2018-01-12 VITALS
OXYGEN SATURATION: 97 % | HEART RATE: 74 BPM | DIASTOLIC BLOOD PRESSURE: 68 MMHG | HEIGHT: 64 IN | BODY MASS INDEX: 33.63 KG/M2 | RESPIRATION RATE: 16 BRPM | TEMPERATURE: 97.8 F | WEIGHT: 197 LBS | SYSTOLIC BLOOD PRESSURE: 114 MMHG

## 2018-01-12 VITALS
WEIGHT: 174 LBS | RESPIRATION RATE: 16 BRPM | HEART RATE: 60 BPM | BODY MASS INDEX: 30.83 KG/M2 | HEIGHT: 63 IN | SYSTOLIC BLOOD PRESSURE: 130 MMHG | DIASTOLIC BLOOD PRESSURE: 80 MMHG | TEMPERATURE: 98.5 F | OXYGEN SATURATION: 98 %

## 2018-01-12 NOTE — RESULT NOTES
Verified Results  (1) HEMOGLOBIN A1C 07Apr2017 11:53AM Danitza Quintanilla     Test Name Result Flag Reference   Hemoglobin A1c 5 6 %  4 8-5 6   Pre-diabetes: 5 7 - 6 4           Diabetes: >6 4           Glycemic control for adults with diabetes: <7 0

## 2018-01-13 VITALS
SYSTOLIC BLOOD PRESSURE: 132 MMHG | HEIGHT: 62 IN | TEMPERATURE: 97.8 F | RESPIRATION RATE: 16 BRPM | DIASTOLIC BLOOD PRESSURE: 76 MMHG | HEART RATE: 68 BPM | WEIGHT: 204 LBS | BODY MASS INDEX: 37.54 KG/M2

## 2018-01-13 VITALS
HEIGHT: 62 IN | RESPIRATION RATE: 16 BRPM | BODY MASS INDEX: 37.91 KG/M2 | DIASTOLIC BLOOD PRESSURE: 76 MMHG | WEIGHT: 206 LBS | SYSTOLIC BLOOD PRESSURE: 125 MMHG | HEART RATE: 75 BPM

## 2018-01-13 VITALS
TEMPERATURE: 98 F | DIASTOLIC BLOOD PRESSURE: 96 MMHG | SYSTOLIC BLOOD PRESSURE: 166 MMHG | BODY MASS INDEX: 37.91 KG/M2 | WEIGHT: 206 LBS | HEART RATE: 76 BPM | HEIGHT: 62 IN | RESPIRATION RATE: 16 BRPM

## 2018-01-14 VITALS
BODY MASS INDEX: 32.25 KG/M2 | TEMPERATURE: 97.7 F | RESPIRATION RATE: 16 BRPM | HEIGHT: 63 IN | DIASTOLIC BLOOD PRESSURE: 80 MMHG | SYSTOLIC BLOOD PRESSURE: 130 MMHG | WEIGHT: 182 LBS | OXYGEN SATURATION: 97 % | HEART RATE: 60 BPM

## 2018-01-22 VITALS
WEIGHT: 180 LBS | BODY MASS INDEX: 31.89 KG/M2 | TEMPERATURE: 97.5 F | SYSTOLIC BLOOD PRESSURE: 106 MMHG | HEART RATE: 64 BPM | RESPIRATION RATE: 16 BRPM | DIASTOLIC BLOOD PRESSURE: 62 MMHG | HEIGHT: 63 IN

## 2018-01-23 VITALS
BODY MASS INDEX: 29.44 KG/M2 | OXYGEN SATURATION: 98 % | HEART RATE: 70 BPM | RESPIRATION RATE: 16 BRPM | DIASTOLIC BLOOD PRESSURE: 80 MMHG | HEIGHT: 62 IN | TEMPERATURE: 98.1 F | WEIGHT: 160 LBS | SYSTOLIC BLOOD PRESSURE: 140 MMHG

## 2018-02-15 DIAGNOSIS — I10 ESSENTIAL HYPERTENSION: Primary | ICD-10-CM

## 2018-02-15 RX ORDER — HYDROCHLOROTHIAZIDE 12.5 MG/1
12.5 TABLET ORAL DAILY
Qty: 90 TABLET | Refills: 3 | Status: SHIPPED | OUTPATIENT
Start: 2018-02-15 | End: 2019-02-08 | Stop reason: SDUPTHER

## 2018-03-06 ENCOUNTER — OFFICE VISIT (OUTPATIENT)
Dept: FAMILY MEDICINE CLINIC | Facility: CLINIC | Age: 68
End: 2018-03-06
Payer: MEDICARE

## 2018-03-06 VITALS
TEMPERATURE: 97.5 F | BODY MASS INDEX: 28.6 KG/M2 | DIASTOLIC BLOOD PRESSURE: 72 MMHG | RESPIRATION RATE: 16 BRPM | HEIGHT: 62 IN | SYSTOLIC BLOOD PRESSURE: 138 MMHG | HEART RATE: 59 BPM | OXYGEN SATURATION: 95 % | WEIGHT: 155.4 LBS

## 2018-03-06 DIAGNOSIS — M54.12 CERVICAL RADICULOPATHY: Primary | ICD-10-CM

## 2018-03-06 PROCEDURE — 99213 OFFICE O/P EST LOW 20 MIN: CPT | Performed by: FAMILY MEDICINE

## 2018-03-06 RX ORDER — PANTOPRAZOLE SODIUM 40 MG/1
1 TABLET, DELAYED RELEASE ORAL DAILY
COMMUNITY
Start: 2014-04-24

## 2018-03-06 RX ORDER — LOSARTAN POTASSIUM 100 MG/1
TABLET ORAL
COMMUNITY
Start: 2018-01-30 | End: 2018-07-26 | Stop reason: SDUPTHER

## 2018-03-06 NOTE — PROGRESS NOTES
Assessment/Plan:  Cervical radiculopathy  The patient is scheduled to see the neurosurgeon at [facility]   Will continue gentle stretching exercises  He had if needed  Follow-up for preoperative clearance  Subjective:     Patient ID: Ani Irby is a 74 y o  female  This is a 27-year-old female who presents for follow-up of cervical radiculopathy which has been longstanding  The patient is decided at this point to go forward with surgery and is here to discuss that issue  Review of Systems   Musculoskeletal: Positive for neck stiffness  Negative for arthralgias, back pain, gait problem, joint swelling, myalgias and neck pain  Objective:     Physical Exam   Constitutional: She appears well-developed and well-nourished  HENT:   Head: Normocephalic and atraumatic  Musculoskeletal:        Arms:  Muscular spasm in the trapezius, rhomboids and subscapularis  Neurological: No cranial nerve deficit   Coordination normal

## 2018-04-03 ENCOUNTER — TELEPHONE (OUTPATIENT)
Dept: FAMILY MEDICINE CLINIC | Facility: CLINIC | Age: 68
End: 2018-04-03

## 2018-04-03 ENCOUNTER — OFFICE VISIT (OUTPATIENT)
Dept: FAMILY MEDICINE CLINIC | Facility: CLINIC | Age: 68
End: 2018-04-03
Payer: MEDICARE

## 2018-04-03 VITALS
WEIGHT: 153 LBS | HEIGHT: 63 IN | BODY MASS INDEX: 27.11 KG/M2 | TEMPERATURE: 98.2 F | RESPIRATION RATE: 16 BRPM | SYSTOLIC BLOOD PRESSURE: 144 MMHG | OXYGEN SATURATION: 99 % | HEART RATE: 68 BPM | DIASTOLIC BLOOD PRESSURE: 88 MMHG

## 2018-04-03 DIAGNOSIS — M54.50 ACUTE MIDLINE LOW BACK PAIN WITHOUT SCIATICA: Primary | ICD-10-CM

## 2018-04-03 PROCEDURE — 99213 OFFICE O/P EST LOW 20 MIN: CPT | Performed by: FAMILY MEDICINE

## 2018-04-03 RX ORDER — MULTIVIT-MIN/IRON/FOLIC ACID/K 18-600-40
1 CAPSULE ORAL DAILY
COMMUNITY
End: 2018-04-23 | Stop reason: SDUPTHER

## 2018-04-03 RX ORDER — GABAPENTIN 300 MG/1
300 CAPSULE ORAL
COMMUNITY
Start: 2017-11-30 | End: 2018-04-11 | Stop reason: SDUPTHER

## 2018-04-03 RX ORDER — NICOTINE POLACRILEX 2 MG
5 GUM BUCCAL
COMMUNITY
End: 2018-11-21 | Stop reason: ALTCHOICE

## 2018-04-03 RX ORDER — METHYLPREDNISOLONE 4 MG/1
TABLET ORAL
Qty: 21 TABLET | Refills: 0 | Status: SHIPPED | OUTPATIENT
Start: 2018-04-03 | End: 2018-11-21 | Stop reason: ALTCHOICE

## 2018-04-03 RX ORDER — NEOMYCIN SULFATE, POLYMYXIN B SULFATE AND HYDROCORTISONE 10; 3.5; 1 MG/ML; MG/ML; [USP'U]/ML
3 SUSPENSION/ DROPS AURICULAR (OTIC)
COMMUNITY
Start: 2017-10-12 | End: 2018-11-21 | Stop reason: ALTCHOICE

## 2018-04-03 RX ORDER — HYDROCHLOROTHIAZIDE 12.5 MG/1
CAPSULE, GELATIN COATED ORAL
COMMUNITY
Start: 2017-02-16 | End: 2018-04-11 | Stop reason: SDUPTHER

## 2018-04-03 RX ORDER — NAPROXEN SODIUM 220 MG
220 TABLET ORAL
COMMUNITY
End: 2018-11-21 | Stop reason: ALTCHOICE

## 2018-04-03 RX ORDER — AMINO ACIDS/MV,IRON,MIN
1 TABLET ORAL DAILY
COMMUNITY
End: 2018-04-23 | Stop reason: SDUPTHER

## 2018-04-03 RX ORDER — CHOLECALCIFEROL (VITAMIN D3) 25 MCG
TABLET,CHEWABLE ORAL
COMMUNITY
End: 2019-09-03 | Stop reason: ALTCHOICE

## 2018-04-03 RX ORDER — GABAPENTIN 300 MG/1
CAPSULE ORAL
COMMUNITY
Start: 2018-03-09 | End: 2018-11-21 | Stop reason: ALTCHOICE

## 2018-04-03 NOTE — TELEPHONE ENCOUNTER
Since she got her xray results, should she move forward with Pt? She also has pain management appt on Monday

## 2018-04-03 NOTE — PROGRESS NOTES
Assessment/Plan:  Lumbar back pain  Lumbar x-ray  Medrol Dosepak  Gentle stretching  Walking for back pain  Follow-up 2 weeks  Diagnoses and all orders for this visit:    Acute midline low back pain without sciatica  -     XR spine lumbar 2 or 3 views injury; Future  -     Methylprednisolone 4 MG TBPK; Use as directed on package    Other orders  -     naproxen sodium (ALEVE) 220 MG tablet; Take 220 mg by mouth  -     gabapentin (NEURONTIN) 300 mg capsule;   -     gabapentin (NEURONTIN) 300 mg capsule; Take 300 mg by mouth  -     mupirocin (BACTROBAN) 2 % ointment;   -     hydrochlorothiazide (MICROZIDE) 12 5 mg capsule;   -     neomycin-polymyxin-hydrocortisone (CORTISPORIN) 0 35%-10,000 units/mL-1% otic suspension; Administer 3 drops into ears  -     gabapentin, once-daily, (GRALISE) 300 MG tablet; Take 1 tablet by mouth 2 (two) times a day  -     Cyanocobalamin (B-12) 1000 MCG TBCR; Place under the tongue  -     Biotin 1 MG CAPS; Take 5 mg by mouth  -     Cholecalciferol 2000 units CAPS; Take 1 capsule by mouth  -     Multiple Vitamins-Minerals (OCUVITE EXTRA) TABS; Take 1 tablet by mouth daily  -     Cholecalciferol (VITAMIN D) 2000 units CAPS; Take 1 tablet by mouth daily          Subjective:     Patient ID: Lamar Fairbanks is a 74 y o  female  This is a 42-year-old female who reports she was on the table for cervical MRI and when she was positioned she developed severe back pain  The patient also noticed an abnormal sensation in the lumbar region around the coccyx  Review of Systems   Musculoskeletal: Positive for back pain  Negative for arthralgias, gait problem, joint swelling, myalgias, neck pain and neck stiffness  Neurological: Negative for dizziness, tremors, seizures, syncope, facial asymmetry, speech difficulty, weakness, light-headedness and headaches  Objective:     Physical Exam   Constitutional: She is oriented to person, place, and time   She appears well-developed and well-nourished  HENT:   Head: Normocephalic and atraumatic  Musculoskeletal: Normal range of motion  She exhibits tenderness (Tenderness across both SI joints  )  She exhibits no edema or deformity  Neurological: She is alert and oriented to person, place, and time  She has normal reflexes  She displays normal reflexes  No cranial nerve deficit  She exhibits normal muscle tone   Coordination normal

## 2018-04-05 DIAGNOSIS — M54.50 ACUTE BILATERAL LOW BACK PAIN WITHOUT SCIATICA: Primary | ICD-10-CM

## 2018-04-06 ENCOUNTER — TELEPHONE (OUTPATIENT)
Dept: FAMILY MEDICINE CLINIC | Facility: CLINIC | Age: 68
End: 2018-04-06

## 2018-04-06 NOTE — TELEPHONE ENCOUNTER
TAINA CALLED AND SAID THAT SHE SAW YOU FOR PAIN IN HER LOWER BACK, BUT SUDDENLY YESTERDAY SHE STARTED GETTING PAIN IN HER NECK, SHOULDERS, AND INTO HER HEAD  SHE WANTED TO KNOW IF THE PREDNISONE YOU GAVE HER WILL BE ENOUGH TO TAKE CARE OF THE PAIN  PLEASE ADVISE   Jann Barahona

## 2018-04-11 ENCOUNTER — OFFICE VISIT (OUTPATIENT)
Dept: FAMILY MEDICINE CLINIC | Facility: CLINIC | Age: 68
End: 2018-04-11
Payer: MEDICARE

## 2018-04-11 VITALS
TEMPERATURE: 97.8 F | SYSTOLIC BLOOD PRESSURE: 150 MMHG | BODY MASS INDEX: 24.43 KG/M2 | OXYGEN SATURATION: 96 % | WEIGHT: 152 LBS | HEIGHT: 66 IN | HEART RATE: 66 BPM | RESPIRATION RATE: 16 BRPM | DIASTOLIC BLOOD PRESSURE: 88 MMHG

## 2018-04-11 DIAGNOSIS — G89.29 CHRONIC BILATERAL LOW BACK PAIN WITHOUT SCIATICA: Primary | ICD-10-CM

## 2018-04-11 DIAGNOSIS — M54.50 CHRONIC BILATERAL LOW BACK PAIN WITHOUT SCIATICA: Primary | ICD-10-CM

## 2018-04-11 PROCEDURE — 99213 OFFICE O/P EST LOW 20 MIN: CPT | Performed by: FAMILY MEDICINE

## 2018-04-11 RX ORDER — GABAPENTIN 300 MG/1
CAPSULE ORAL
COMMUNITY
Start: 2018-03-09 | End: 2018-04-11 | Stop reason: SDUPTHER

## 2018-04-11 RX ORDER — HYDROCHLOROTHIAZIDE 12.5 MG/1
TABLET ORAL
COMMUNITY
Start: 2018-02-15 | End: 2018-04-11 | Stop reason: SDUPTHER

## 2018-04-11 RX ORDER — METHYLPREDNISOLONE 4 MG/1
TABLET ORAL
COMMUNITY
Start: 2018-04-03 | End: 2018-04-11 | Stop reason: SDUPTHER

## 2018-04-11 NOTE — PROGRESS NOTES
Assessment/Plan:  Low back pain  Completed Medrol Dosepak and is feeling much better  Will continue gentle stretching at home  Cervical microdiskectomy is scheduled for May 1, 2018  Follow-up as needed  Surgical clearance scheduled for April 23, 2018  Subjective:     Patient ID: Wai Ha is a 74 y o  female  This is a 44-year-old female following up for her low back pain  Review of Systems   Musculoskeletal: Positive for back pain  Negative for arthralgias, gait problem, joint swelling, myalgias and neck pain  Neurological: Negative  Objective:     Physical Exam   Constitutional: She is oriented to person, place, and time  Musculoskeletal: Normal range of motion  She exhibits no edema or deformity  Tenderness: Tenderness over the SI joints  Neurological: She is alert and oriented to person, place, and time  She has normal reflexes  She displays normal reflexes  No cranial nerve deficit  She exhibits normal muscle tone   Coordination normal

## 2018-04-13 ENCOUNTER — EVALUATION (OUTPATIENT)
Dept: PHYSICAL THERAPY | Facility: CLINIC | Age: 68
End: 2018-04-13
Payer: MEDICARE

## 2018-04-13 DIAGNOSIS — M54.50 ACUTE BILATERAL LOW BACK PAIN WITHOUT SCIATICA: ICD-10-CM

## 2018-04-13 PROCEDURE — G8981 BODY POS CURRENT STATUS: HCPCS | Performed by: PHYSICAL THERAPIST

## 2018-04-13 PROCEDURE — G8982 BODY POS GOAL STATUS: HCPCS | Performed by: PHYSICAL THERAPIST

## 2018-04-13 PROCEDURE — 97162 PT EVAL MOD COMPLEX 30 MIN: CPT | Performed by: PHYSICAL THERAPIST

## 2018-04-13 NOTE — PROGRESS NOTES
PT Evaluation     Today's date: 2018  Patient name: Yanet Calix  : 1950  MRN: [id number]  Referring provider: Todd Alcocer MD  Dx:   Encounter Diagnosis     ICD-10-CM    1  Acute bilateral low back pain without sciatica M54 5 Ambulatory referral to Physical Therapy                  Assessment    Assessment details: The patient's signs and symptoms suggest the possibility of cauda equina  She has a prescription for a MRI of the lumbar spine, but is hesitant to follow through with the test in fear of increased neck pain and also that the insurance may not cover the exam   I will be contacting Dr Maylin Cespedes and Dr Bear South with the above mentioned concerns  Plan  Plan details: Hold PT until medically cleared  Subjective Evaluation    History of Present Illness  Date of onset: 3/29/2018  Mechanism of injury: trauma  Mechanism of injury: Patient reports that she is having a cervical surgery on 2018, Dr Bear South is performing the surgery  Patient reports that on 3/29/18 she was having a MRI, when the technician repositioned her legs without warning and she developed immediate gonzalez pain in the center low back  She has had pain in the low back especially with sitting, PCP prescribed pregnisone, but the pain continues  MRI of lumbar spine 5 monthago reveal sigfnificant degeneration, had a X-ray of low back to rule out fracture  Patient reports bladder dysfunction with leakage      Recurrent probem    Quality of life: good    Pain  Current pain ratin  At best pain ratin  At worst pain rating: 10  Location: low back pain, coccyz area, burning   Quality: burning and knife-like  Relieving factors: medications  Aggravating factors: sitting  Progression: improved    Social Support  Steps to enter house: yes  Stairs in house: yes   Lives in: multiple-level home  Lives with: alone    Employment status: not working  Hand dominance: right    Treatments  Current treatment: physical therapy  Patient Goals  Patient goals for therapy: decreased pain, increased motion, increased strength and independence with ADLs/IADLs          Objective     Neurological Testing     Sensation     Lumbar   Left   Intact: light touch    Right   Intact: light touch    Reflexes   Left   Patellar (L4): normal (2+)  Achilles (S1): normal (2+)    Right   Patellar (L4): normal (2+)  Achilles (S1): normal (2+)    Additional Neurological Details  Altered sensation in saddle region  Incontinence, Not making it to bathroom, leakage  Episodes of weakness on the knees, feeling unsteady at times      Strength/Myotome Testing     Left Hip   Planes of Motion   Flexion: 5    Right Hip   Planes of Motion   Flexion: 5    Left Ankle/Foot   Plantar flexion: 5  Great toe flexion: 5  Great toe extension: 5    Right Ankle/Foot   Plantar flexion: 5  Great toe flexion: 5  Great toe extension: 5      Flowsheet Rows    Flowsheet Row Most Recent Value   PT/OT G-Codes   Current Score  39   Projected Score  60   FOTO information reviewed  Yes   Assessment Type  Evaluation   G code set  Changing & Maintaining Body Position   Changing and Maintaining Body Position Current Status ()  CL   Changing and Maintaining Body Position Goal Status ()  CK        Precautions Possible Cauda Equina, Multiple degenerative spinal changes    Specialty Daily Treatment Diary     Manual                                                     Exercise Diary                                                                                                                                                                             Modalities

## 2018-04-23 ENCOUNTER — OFFICE VISIT (OUTPATIENT)
Dept: FAMILY MEDICINE CLINIC | Facility: CLINIC | Age: 68
End: 2018-04-23
Payer: MEDICARE

## 2018-04-23 VITALS
HEIGHT: 62 IN | SYSTOLIC BLOOD PRESSURE: 140 MMHG | DIASTOLIC BLOOD PRESSURE: 78 MMHG | HEART RATE: 56 BPM | RESPIRATION RATE: 16 BRPM | TEMPERATURE: 97.5 F | BODY MASS INDEX: 27.79 KG/M2 | WEIGHT: 151 LBS | OXYGEN SATURATION: 94 %

## 2018-04-23 DIAGNOSIS — M50.122 CERVICAL DISC DISORDER AT C5-C6 LEVEL WITH RADICULOPATHY: Primary | ICD-10-CM

## 2018-04-23 PROCEDURE — 99213 OFFICE O/P EST LOW 20 MIN: CPT | Performed by: FAMILY MEDICINE

## 2018-04-23 RX ORDER — PANTOPRAZOLE SODIUM 40 MG/1
40 TABLET, DELAYED RELEASE ORAL
COMMUNITY
Start: 2017-01-20 | End: 2018-04-23 | Stop reason: SDUPTHER

## 2018-04-23 RX ORDER — HYDROCHLOROTHIAZIDE 12.5 MG/1
12.5 TABLET ORAL
COMMUNITY
Start: 2018-02-15 | End: 2018-04-23 | Stop reason: SDUPTHER

## 2018-04-23 RX ORDER — NICOTINE POLACRILEX 2 MG
5 GUM BUCCAL
COMMUNITY
End: 2018-04-23 | Stop reason: SDUPTHER

## 2018-04-23 RX ORDER — MUPIROCIN CALCIUM 20 MG/G
CREAM TOPICAL
COMMUNITY
Start: 2018-03-12 | End: 2018-11-21 | Stop reason: ALTCHOICE

## 2018-04-23 RX ORDER — NAPROXEN SODIUM 220 MG
220 TABLET ORAL
COMMUNITY
End: 2018-04-23 | Stop reason: SDUPTHER

## 2018-04-23 RX ORDER — LOSARTAN POTASSIUM 100 MG/1
100 TABLET ORAL
COMMUNITY
Start: 2017-02-02 | End: 2018-04-23 | Stop reason: SDUPTHER

## 2018-04-23 NOTE — PROGRESS NOTES
Assessment/Plan:  Cervical radiculopathy  Scheduled for C5-C6 cervical disc arthroplasty  Surgeon:  Francisco RAUSCH  Date of surgery:  May 1, 2018  EKG -normal EKG  Laboratory evaluation reviewed and there is no abnormality  The surgery is considered low risk  The patient is clear for surgery without further testing  Subjective:      Patient ID: Gilford Butler is a 74 y o  female  This is a 61-year-old female who presents for a surgical clearance  Review of Systems   Constitutional: Negative  HENT: Negative  Eyes: Negative  Respiratory: Negative  Cardiovascular: Negative  Gastrointestinal: Negative  Musculoskeletal: Positive for neck pain  Neurological: Negative for dizziness, tremors, seizures, syncope, facial asymmetry, speech difficulty, weakness, light-headedness and headaches  Numbness:  numbness in the sacral area  Objective:      /78 (BP Location: Right arm, Patient Position: Sitting, Cuff Size: Standard)   Pulse 56   Temp 97 5 °F (36 4 °C) (Temporal)   Resp 16   Ht 5' 2" (1 575 m)   Wt 68 5 kg (151 lb)   SpO2 94%   BMI 27 62 kg/m²          Physical Exam   Constitutional: She is oriented to person, place, and time  She appears well-developed and well-nourished  HENT:   Head: Normocephalic and atraumatic  Right Ear: External ear normal    Left Ear: External ear normal    Nose: Nose normal    Mouth/Throat: Oropharynx is clear and moist    Eyes: Conjunctivae and EOM are normal  Pupils are equal, round, and reactive to light  Right eye exhibits no discharge  Left eye exhibits no discharge  No scleral icterus  Neck: Neck supple  No JVD present  No tracheal deviation present  No thyromegaly present  Cardiovascular: Normal rate, regular rhythm and normal heart sounds  Exam reveals no gallop and no friction rub  No murmur heard  Pulmonary/Chest: Effort normal and breath sounds normal  No stridor  No respiratory distress  She has no wheezes  She has no rales  She exhibits no tenderness  Abdominal: Soft  Bowel sounds are normal  She exhibits no distension and no mass  There is no tenderness  There is no rebound and no guarding  Musculoskeletal:   Left-sided cervical radicular pain  Lymphadenopathy:     She has no cervical adenopathy  Neurological: She is alert and oriented to person, place, and time  No cranial nerve deficit  Psychiatric: She has a normal mood and affect   Her behavior is normal  Judgment and thought content normal

## 2018-07-02 ENCOUNTER — TRANSCRIBE ORDERS (OUTPATIENT)
Dept: PHYSICAL THERAPY | Facility: CLINIC | Age: 68
End: 2018-07-02

## 2018-07-02 ENCOUNTER — EVALUATION (OUTPATIENT)
Dept: PHYSICAL THERAPY | Facility: CLINIC | Age: 68
End: 2018-07-02
Payer: MEDICARE

## 2018-07-02 DIAGNOSIS — M48.02 CERVICAL SPINAL STENOSIS: ICD-10-CM

## 2018-07-02 DIAGNOSIS — M48.02 CERVICAL SPINAL STENOSIS: Primary | ICD-10-CM

## 2018-07-02 DIAGNOSIS — Z98.890 PERSONAL HISTORY OF SURGERY TO HEART AND GREAT VESSELS, PRESENTING HAZARDS TO HEALTH: Primary | ICD-10-CM

## 2018-07-02 DIAGNOSIS — Z98.890 PERSONAL HISTORY OF SPINE SURGERY: ICD-10-CM

## 2018-07-02 PROCEDURE — G8984 CARRY CURRENT STATUS: HCPCS

## 2018-07-02 PROCEDURE — 97110 THERAPEUTIC EXERCISES: CPT

## 2018-07-02 PROCEDURE — G8985 CARRY GOAL STATUS: HCPCS

## 2018-07-02 PROCEDURE — 97161 PT EVAL LOW COMPLEX 20 MIN: CPT

## 2018-07-02 NOTE — PROGRESS NOTES
PT Evaluation     Today's date: 2018  Patient name: Nkechi Rico  : 1950  MRN: [id number]  Referring provider: Willam Hassan PA-C  Dx:   Encounter Diagnosis     ICD-10-CM    1  Cervical spinal stenosis M48 02    2  Personal history of spine surgery [id number]                   Assessment  Impairments: abnormal muscle firing, abnormal or restricted ROM, activity intolerance, impaired physical strength, lacks appropriate home exercise program and pain with function    Assessment details: Nkechi Rico is a 74 y o  female who presents with pain, decreased strength and decreased ROM  Due to these impairments, patient has difficulty performing ADL's, recreational activities and lifting/carrying  Patient's clinical presentation is consistent with their referring diagnosis of Cervical spinal stenosis  (primary encounter diagnosis)    Personal history of spine surgery    Patient has been educated in home exercise program and plan of care  Patient would benefit from skilled physical therapy services to address their aforementioned functional limitations and progress towards prior level of function and independence with home exercise program      Understanding of Dx/Px/POC: good   Prognosis: good    Goals  Short Term Goals: Target Date 18  1  Initiate and advance HEP  2  Decrease c/o pain to 3/10 at worst  3  Restore Cervical and Lumbar ROM to 25% loss at worst      Long Term Goals: Target Date 18  1  Indep with HEP  2  Pt to return to gardening and housework with less than 3/10 pain  3   Im prove Core stability to Fair +    Plan  Patient would benefit from: skilled PT  Planned modality interventions: cryotherapy and thermotherapy: hydrocollator packs  Planned therapy interventions: joint mobilization, manual therapy, patient education, postural training, activity modification, abdominal trunk stabilization, body mechanics training, flexibility, functional ROM exercises, graded exercise, home exercise program, neuromuscular re-education, strengthening, stretching, therapeutic activities, therapeutic exercise, motor coordination training, muscle pump exercises, balance/weight bearing training and ADL training  Frequency: 2x week  Duration in weeks: 8  Treatment plan discussed with: patient        Subjective Evaluation    History of Present Illness  Date of onset: 2016  Date of surgery: 2018  Not a recurrent problem   Quality of life: good    Pain  Current pain ratin  At best pain ratin  At worst pain rating: 3  Location: left neck and shoulder  Quality: dull ache  Relieving factors: rest  Aggravating factors: overhead activity  Progression: improved    Patient Goals  Patient goals for therapy: decreased pain and increased strength  Patient goal: return to housework and gardening    Pt reports at this time low back pain is more restricting than cervical pain  Pt reports pain = 6-7/10 at worst and is across low back sometimes more on left side than right side      Objective  Cervical ROM  Flexion - 75% loss  Extension - 50% loss  Rotation - Right 50% loss   Left - 75% loss  Sidebend - 90% loss bilaterally     Lumbar ROM:  Flex - 50% loss  Ext - 50% loss    Core stability = Poor    Precautions: Standard    Daily Treatment Diary     Manual  /            LE stretching 5 min                                                                    Exercise Diary              Row with rotation - [place] 4# 2 x 10            SL bridges 4 x 5            Mod LE lowering 4 x 5            Clam shells  Blue 2 x 10            Cervical AROM rotation 10 x                                                                                                                                                                                                                   Modalities

## 2018-07-02 NOTE — LETTER
2018    Keesha Truong PA-C  [address] [facility]    Patient: Criss Stroud   YOB: 1950   Date of Visit: 2018     Encounter Diagnosis     ICD-10-CM    1  Cervical spinal stenosis M48 02    2  Personal history of spine surgery [id number]        Dear Dr Infante Foot:    Please review the attached Plan of Care from [facility]'S recent visit  Please verify that you agree therapy should continue by signing the attached document and sending it back to our office  If you have any questions or concerns, please don't hesitate to call  Sincerely,    Elpidio Yost, PT      Referring Provider:      I certify that I have read the below Plan of Care and certify the need for these services furnished under this plan of treatment while under my care  Keesha Truong PA-C  [address]  [address] [facility]  VIA Facsimile: [phone]          PT Evaluation     Today's date: 2018  Patient name: Criss Stroud  : 1950  MRN: [id number]  Referring provider: Alonso Dean PA-C  Dx:   Encounter Diagnosis     ICD-10-CM    1  Cervical spinal stenosis M48 02    2  Personal history of spine surgery [id number] [id number]                   Assessment  Impairments: abnormal muscle firing, abnormal or restricted ROM, activity intolerance, impaired physical strength, lacks appropriate home exercise program and pain with function    Assessment details: Criss Stroud is a 74 y o  female who presents with pain, decreased strength and decreased ROM  Due to these impairments, patient has difficulty performing ADL's, recreational activities and lifting/carrying  Patient's clinical presentation is consistent with their referring diagnosis of Cervical spinal stenosis  (primary encounter diagnosis)    Personal history of spine surgery    Patient has been educated in home exercise program and plan of care   Patient would benefit from skilled physical therapy services to address their aforementioned functional limitations and progress towards prior level of function and independence with home exercise program      Understanding of Dx/Px/POC: good   Prognosis: good    Goals  Short Term Goals: Target Date 18  1  Initiate and advance HEP  2  Decrease c/o pain to 3/10 at worst  3  Restore Cervical and Lumbar ROM to 25% loss at worst      Long Term Goals: Target Date 18  1  Indep with HEP  2  Pt to return to gardening and housework with less than 3/10 pain  3  Im prove Core stability to Fair +    Plan  Patient would benefit from: skilled PT  Planned modality interventions: cryotherapy and thermotherapy: hydrocollator packs  Planned therapy interventions: joint mobilization, manual therapy, patient education, postural training, activity modification, abdominal trunk stabilization, body mechanics training, flexibility, functional ROM exercises, graded exercise, home exercise program, neuromuscular re-education, strengthening, stretching, therapeutic activities, therapeutic exercise, motor coordination training, muscle pump exercises, balance/weight bearing training and ADL training  Frequency: 2x week  Duration in weeks: 8  Treatment plan discussed with: patient        Subjective Evaluation    History of Present Illness  Date of onset: 2016  Date of surgery: 2018  Not a recurrent problem   Quality of life: good    Pain  Current pain ratin  At best pain ratin  At worst pain rating: 3  Location: left neck and shoulder  Quality: dull ache  Relieving factors: rest  Aggravating factors: overhead activity  Progression: improved    Patient Goals  Patient goals for therapy: decreased pain and increased strength  Patient goal: return to housework and gardening    Pt reports at this time low back pain is more restricting than cervical pain   Pt reports pain = 6-7/10 at worst and is across low back sometimes more on left side than right side     Objective  Cervical ROM  Flexion - 75% loss  Extension - 50% loss  Rotation - Right 50% loss   Left - 75% loss  Sidebend - 90% loss bilaterally     Lumbar ROM:  Flex - 50% loss  Ext - 50% loss    Core stability = Poor    Precautions: Standard    Daily Treatment Diary     Manual  7/2            LE stretching 5 min                                                                    Exercise Diary              Row with rotation - [place] 4# 2 x 10            SL bridges 4 x 5            Mod LE lowering 4 x 5            Clam shells  Blue 2 x 10            Cervical AROM rotation 10 x                                                                                                                                                                                                                   Modalities

## 2018-07-05 ENCOUNTER — OFFICE VISIT (OUTPATIENT)
Dept: PHYSICAL THERAPY | Facility: CLINIC | Age: 68
End: 2018-07-05
Payer: MEDICARE

## 2018-07-05 DIAGNOSIS — M48.02 CERVICAL SPINAL STENOSIS: Primary | ICD-10-CM

## 2018-07-05 DIAGNOSIS — Z98.890 PERSONAL HISTORY OF SPINE SURGERY: ICD-10-CM

## 2018-07-05 PROCEDURE — 97110 THERAPEUTIC EXERCISES: CPT

## 2018-07-05 PROCEDURE — 97112 NEUROMUSCULAR REEDUCATION: CPT

## 2018-07-05 NOTE — PROGRESS NOTES
Daily Note     Today's date: 2018  Patient name: Sandra Florentino  : 1950  MRN: [id number]  Referring provider: Gina Sanchez PA-C  Dx:   Encounter Diagnosis     ICD-10-CM    1  Cervical spinal stenosis M48 02    2  Personal history of spine surgery [id number] [id number]                   Subjective: Pt reports she experienced a little soreness in left neck and upper back for approx 12-24 hours after last session and has felt better since      Objective: See treatment diary below      Manual  /            LE stretching 5 min                                                                    Exercise Diary   7/           Row with rotation - [place] 4# 2 x 10 4 # 2 x 10           bridges 4 x 5 4 x 5           Mod LE lowering 4 x 5 4 x 5           Clam shells  Blue 2 x 10 Blue 2 x 10           Cervical AROM rotation 10 x rotation 10x           Lats on [place]  6# 2 x 10           Tricep ext  6# 2 x 10           Bicep Curls  1lb 2 x 10           Pulleys  10 x 10sec                                                                                                                                                              Modalities                                                             Assessment: Tolerated treatment well  Patient would benefit from continued PT  Repeated Cervical flex/Ext AROM added to HEP 3x a day 10x's each direction      Plan: Continue per plan of care

## 2018-07-10 ENCOUNTER — OFFICE VISIT (OUTPATIENT)
Dept: PHYSICAL THERAPY | Facility: CLINIC | Age: 68
End: 2018-07-10
Payer: MEDICARE

## 2018-07-10 DIAGNOSIS — M48.02 CERVICAL SPINAL STENOSIS: Primary | ICD-10-CM

## 2018-07-10 PROCEDURE — 97112 NEUROMUSCULAR REEDUCATION: CPT | Performed by: PHYSICAL THERAPIST

## 2018-07-10 PROCEDURE — 97110 THERAPEUTIC EXERCISES: CPT | Performed by: PHYSICAL THERAPIST

## 2018-07-10 NOTE — PROGRESS NOTES
Daily Note     Today's date: 7/10/2018  Patient name: Sushma Friedman  : 1950  MRN: [id number]  Referring provider: Froilan Mark PA-C  Dx:   Encounter Diagnosis     ICD-10-CM    1  Cervical spinal stenosis M48 02                   Subjective: I am fearful of exercise that may increase my back or hip pain  Objective: See treatment diary below      Manual              LE stretching 5 min                                                                    Exercise Diary    7/10          Row with rotation - [place] 4# 2 x 10 4 # 2 x 10           bridges 4 x 5 4 x 5           Mod LE lowering 4 x 5 4 x 5           Clam shells  Blue 2 x 10 Blue 2 x 10           Cervical AROM rotation 10 x rotation 10x           Lats on [place]  6# 2 x 10           Tricep ext  6# 2 x 10           Bicep Curls  1lb 2 x 10           Pulleys  10 x 10sec           Nu step   L 1 10 min          TB Scap retraction   Y tb 10x          TB ER, IR   y tb 10x          Scapular retraction with depression   10x          Cervical Isometrics   4 x 10                                                                                            Modalities                                                                 Assessment: Tolerated treatment well  Patient demonstrated fatigue post treatment and exhibited good technique with therapeutic exercises      Plan: Continue per plan of care

## 2018-07-12 ENCOUNTER — OFFICE VISIT (OUTPATIENT)
Dept: PHYSICAL THERAPY | Facility: CLINIC | Age: 68
End: 2018-07-12
Payer: MEDICARE

## 2018-07-12 DIAGNOSIS — M48.02 CERVICAL SPINAL STENOSIS: Primary | ICD-10-CM

## 2018-07-12 PROCEDURE — 97110 THERAPEUTIC EXERCISES: CPT | Performed by: PHYSICAL THERAPIST

## 2018-07-12 PROCEDURE — 97112 NEUROMUSCULAR REEDUCATION: CPT | Performed by: PHYSICAL THERAPIST

## 2018-07-12 NOTE — PROGRESS NOTES
Daily Note     Today's date: 2018  Patient name: Lakesha Caro  : 1950  MRN: [id number]  Referring provider: Miriam Roman PA-C  Dx:   Encounter Diagnosis     ICD-10-CM    1  Cervical spinal stenosis M48 02                   Subjective: I have very little pain  Objective: See treatment diary below      Assessment: Tolerated treatment well  Patient demonstrated fatigue post treatment and exhibited good technique with therapeutic exercises      Plan: Continue per plan of care          Objective: See treatment diary below      Manual              LE stretching 5 min                                                                    Exercise Diary   7/5 7/10 7/12         Row with rotation - [place] 4# 2 x 10 4 # 2 x 10           bridges 4 x 5 4 x 5           Mod LE lowering 4 x 5 4 x 5           Clam shells  Blue 2 x 10 Blue 2 x 10           Cervical AROM rotation 10 x rotation 10x           Lats on [place]  6# 2 x 10           Tricep ext  6# 2 x 10           Bicep Curls  1lb 2 x 10           Pulleys  10 x 10sec           Nu step   L 1 10 min 10 min         TB Scap retraction   Y tb 10x yellow 20x         TB ER, IR   y tb 10x yellow 20x         Scapular retraction with depression   10x 20x         Cervical Isometrics   4 x 10          Justina Day 1    5x                                                                              Modalities

## 2018-07-17 ENCOUNTER — OFFICE VISIT (OUTPATIENT)
Dept: PHYSICAL THERAPY | Facility: CLINIC | Age: 68
End: 2018-07-17
Payer: MEDICARE

## 2018-07-17 DIAGNOSIS — M48.02 CERVICAL SPINAL STENOSIS: Primary | ICD-10-CM

## 2018-07-17 PROCEDURE — G8984 CARRY CURRENT STATUS: HCPCS

## 2018-07-17 PROCEDURE — 97110 THERAPEUTIC EXERCISES: CPT

## 2018-07-17 PROCEDURE — G8985 CARRY GOAL STATUS: HCPCS

## 2018-07-17 NOTE — PROGRESS NOTES
Daily Note     Today's date: 2018  Patient name: Laura Munguia  : 1950  MRN: [id number]  Referring provider: Janette Parsons PA-C  Dx: No diagnosis found  Subjective: Overall feeling better  I experienced some different "sensations" on Friday, so I called the Dr, & he said it was normal       Objective: See treatment diary below  Precautions : Hx of cervical spine surgery (18) : ONSET - 16    Manual  /            LE stretching 5 min                                                                    Exercise Diary   7/5 7/10 7/12 7/17        Row with rotation - [place] 4# 2 x 10 4 # 2 x 10   ---        bridges 4 x 5 4 x 5   ---        Mod LE lowering 4 x 5 4 x 5   ---        Clam shells  Blue 2 x 10 Blue 2 x 10   Blue tb x20        Cervical AROM rotation 10 x rotation 10x   hep        Lats on [place]  6# 2 x 10   ---        Tricep ext  6# 2 x 10   ---        Bicep Curls  1lb 2 x 10   ---        Pulleys  10 x 10sec   ----        Nu step   L 1 10 min 10 min 10min L1        TB Scap retraction   Y tb 10x yellow 20x 20x yellow        TB ER, IR   y tb 10x yellow 20x Ir/er,ext, add x10 each        Scapular retraction with depression   10x 20x 2x10  supine/seated        Cervical Isometrics   4 x 10  hep        Justina Day 1    5x hep        Hip add squeeze     10x 5 sec        SLR     2x10        Blue PB rollouts     13x                                      Modalities                                                       Assessment: Tolerated treatment fair  Patient demonstrated fatigue post treatment      Plan: Progress treatment as tolerated

## 2018-07-19 ENCOUNTER — APPOINTMENT (OUTPATIENT)
Dept: PHYSICAL THERAPY | Facility: CLINIC | Age: 68
End: 2018-07-19
Payer: MEDICARE

## 2018-07-20 ENCOUNTER — OFFICE VISIT (OUTPATIENT)
Dept: PHYSICAL THERAPY | Facility: CLINIC | Age: 68
End: 2018-07-20
Payer: MEDICARE

## 2018-07-20 DIAGNOSIS — M48.02 CERVICAL SPINAL STENOSIS: Primary | ICD-10-CM

## 2018-07-20 PROCEDURE — 97110 THERAPEUTIC EXERCISES: CPT

## 2018-07-20 NOTE — PROGRESS NOTES
Daily Note     Today's date: 2018  Patient name: Jenna Wick  : 1950  MRN: [id number]  Referring provider: Ivanna Amaro PA-C  Dx:   Encounter Diagnosis     ICD-10-CM    1  Cervical spinal stenosis M48 02                   Subjective: Reports mild soreness from holding a camera yesterday morning  Objective: See treatment diary below    Precautions : Hx of cervical spine surgery (18) : ONSET - 16    Manual  7/            LE stretching 5 min                                                                    Exercise Diary   7/5 7/10 7/12 7/17 7/20       Row with rotation - [place] 4# 2 x 10 4 # 2 x 10   --- --       bridges 4 x 5 4 x 5   --- ---       Mod LE lowering 4 x 5 4 x 5   --- --       Clam shells  Blue 2 x 10 Blue 2 x 10   Blue tb x20 bl tb x20       Cervical AROM rotation 10 x rotation 10x   hep hep       Lats on [place]  6# 2 x 10   --- ---       Tricep ext  6# 2 x 10   --- ---       Bicep Curls  1lb 2 x 10   --- ----       Pulleys  10 x 10sec   ---- ---       Nu step   L 1 10 min 10 min 10min L1 10min L1       TB Scap retraction   Y tb 10x yellow 20x 20x yellow y tb x20       TB ER, IR   y tb 10x yellow 20x Ir/er,ext, add x10 each y tb x20       Scapular retraction with depression   10x 20x 2x10  supine/seated 2x10 supine       Cervical Isometrics   4 x 10  hep hep       Justina Day 1    5x hep hep       Hip add squeeze     10x 5 sec [id number]       SLR     2x10 2x10       Blue PB rollouts     13x 20x                                     Modalities                                                       Assessment: Tolerated treatment fair  Patient demonstrated fatigue post treatment      Plan: Progress treatment as tolerated

## 2018-07-23 ENCOUNTER — OFFICE VISIT (OUTPATIENT)
Dept: PHYSICAL THERAPY | Facility: CLINIC | Age: 68
End: 2018-07-23
Payer: MEDICARE

## 2018-07-23 DIAGNOSIS — M48.02 CERVICAL SPINAL STENOSIS: Primary | ICD-10-CM

## 2018-07-23 PROCEDURE — 97110 THERAPEUTIC EXERCISES: CPT

## 2018-07-23 NOTE — PROGRESS NOTES
Daily Note     Today's date: 2018  Patient name: Iona Monte  : 1950  MRN: [id number]  Referring provider: Abi Calvert PA-C  Dx:   Encounter Diagnosis     ICD-10-CM    1  Cervical spinal stenosis M48 02                   Subjective: My L elbow is sore today, probably from rainy weather  Objective: See treatment diary below    Precautions : Hx of cervical spine surgery (18) : ONSET - 16    Manual  7/2            LE stretching 5 min                                                                    Exercise Diary   7/5 7/10 7/12 7/17 7/20 7/23      Row with rotation - [place] 4# 2 x 10 4 # 2 x 10   --- -- --      bridges 4 x 5 4 x 5   --- --- --      Mod LE lowering 4 x 5 4 x 5   --- -- --      Clam shells  Blue 2 x 10 Blue 2 x 10   Blue tb x20 bl tb x20 bl tb x20      Cervical AROM rotation 10 x rotation 10x   hep hep hep      Lats on [place]  6# 2 x 10   --- --- --      Tricep ext  6# 2 x 10   --- --- --      Bicep Curls  1lb 2 x 10   --- ---- 1# R  0# L x20      Pulleys  10 x 10sec   ---- --- ---      Nu step   L 1 10 min 10 min 10min L1 10min L1 10min L1      TB Scap retraction   Y tb 10x yellow 20x 20x yellow y tb x20 y tb x20      TB ER, IR   y tb 10x yellow 20x Ir/er,ext, add x10 each y tb x20 y tb x20      Scapular retraction with depression   10x 20x 2x10  supine/seated 2x10 supine 2x10 supine      Cervical Isometrics   4 x 10  hep hep hep      Justina Day 1    5x hep hep hep      Hip add squeeze     10x 5 sec [id number]       SLR     2x10 2x10 2x10      Blue PB rollouts     13x 20x 20x      Serratus punch        supine x 10      Supine pec stretch       10x          Modalities                                                         Assessment: Tolerated treatment fair  Patient would benefit from continued PT      Plan: Progress treatment as tolerated

## 2018-07-26 ENCOUNTER — OFFICE VISIT (OUTPATIENT)
Dept: PHYSICAL THERAPY | Facility: CLINIC | Age: 68
End: 2018-07-26
Payer: MEDICARE

## 2018-07-26 DIAGNOSIS — I10 ESSENTIAL HYPERTENSION: Primary | ICD-10-CM

## 2018-07-26 DIAGNOSIS — M48.02 CERVICAL SPINAL STENOSIS: Primary | ICD-10-CM

## 2018-07-26 PROCEDURE — 97110 THERAPEUTIC EXERCISES: CPT | Performed by: PHYSICAL THERAPIST

## 2018-07-26 RX ORDER — LOSARTAN POTASSIUM 100 MG/1
100 TABLET ORAL DAILY
Qty: 90 TABLET | Refills: 3 | Status: SHIPPED | OUTPATIENT
Start: 2018-07-26 | End: 2019-07-24 | Stop reason: SDUPTHER

## 2018-07-26 NOTE — PROGRESS NOTES
Daily Note     Today's date: 2018  Patient name: Dat Branch  : 1950  MRN: [id number]  Referring provider: Kyra Ya PA-C  Dx:   Encounter Diagnosis     ICD-10-CM    1  Cervical spinal stenosis M48 02                   Subjective: I am feeling tired today      Objective: See treatment diary below      Assessment: Tolerated treatment well  Patient demonstrated fatigue post treatment and exhibited good technique with therapeutic exercises      Plan: Continue per plan of care     Objective: See treatment diary below    Precautions : Hx of cervical spine surgery (18) : ONSET - 16    Manual  /            LE stretching 5 min                                                                    Exercise Diary   7/5 7/10 7/12 7/17 7/20 7/23 7/26     Row with rotation - Oleg 4# 2 x 10 4 # 2 x 10   --- -- --      bridges 4 x 5 4 x 5   --- --- --      Mod LE lowering 4 x 5 4 x 5   --- -- --      Clam shells  Blue 2 x 10 Blue 2 x 10   Blue tb x20 bl tb x20 bl tb x20      Cervical AROM rotation 10 x rotation 10x   hep hep hep      Lats on Oleg  6# 2 x 10   --- --- --      Tricep ext  6# 2 x 10   --- --- --      Bicep Curls  1lb 2 x 10   --- ---- 1# R  0# L x20      Pulleys  10 x 10sec   ---- --- ---      Nu step   L 1 10 min 10 min 10min L1 10min L1 10min L1 10 min     TB Scap retraction   Y tb 10x yellow 20x 20x yellow y tb x20 y tb x20 r tb  20x     TB ER, IR   y tb 10x yellow 20x Ir/er,ext, add x10 each y tb x20 y tb x20 r tb 20x     Scapular retraction with depression   10x 20x 2x10  supine/seated 2x10 supine 2x10 supine 20x sitting     Cervical Isometrics   4 x 10  hep hep hep      Justina Day 1    5x hep hep hep      Hip add squeeze     10x 5 sec [id number]       SLR     2x10 2x10 2x10      Blue PB rollouts     13x 20x 20x      Serratus punch        supine x 10      Supine pec stretch       10x          Modalities

## 2018-07-30 ENCOUNTER — OFFICE VISIT (OUTPATIENT)
Dept: PHYSICAL THERAPY | Facility: CLINIC | Age: 68
End: 2018-07-30
Payer: MEDICARE

## 2018-07-30 DIAGNOSIS — M48.02 CERVICAL SPINAL STENOSIS: Primary | ICD-10-CM

## 2018-07-30 DIAGNOSIS — Z98.890 PERSONAL HISTORY OF SPINE SURGERY: ICD-10-CM

## 2018-07-30 PROCEDURE — 97110 THERAPEUTIC EXERCISES: CPT

## 2018-07-30 PROCEDURE — 97112 NEUROMUSCULAR REEDUCATION: CPT

## 2018-07-30 NOTE — PROGRESS NOTES
Daily Note     Today's date: 2018  Patient name: Dat Branch  : 1950  MRN: [id number]  Referring provider: Kyra Ya PA-C  Dx:   Encounter Diagnosis     ICD-10-CM    1  Cervical spinal stenosis M48 02    2  Personal history of spine surgery [id number]                   Subjective: Pt without complaint  Reports feeling much stronger    Objective: See treatment diary below      Assessment: Tolerated treatment well  Patient demonstrated fatigue post treatment and exhibited good technique with therapeutic exercises      Plan: Continue per plan of care     Objective: See treatment diary below    Precautions : Hx of cervical spine surgery (18) : ONSET - 16    Manual             LE stretching 5 min            UE stretching  5 min                                                      Exercise Diary   7/5 7/10 7/12 7/17 7/20 7/23 7/26 7/30    Row with rotation - Oleg 4# 2 x 10 4 # 2 x 10   --- -- --  6# 2 x 10    bridges 4 x 5 4 x 5   --- --- --  2 x10    Mod LE lowering 4 x 5 4 x 5   --- -- --  2 x 10    Clam shells  Blue 2 x 10 Blue 2 x 10   Blue tb x20 bl tb x20 bl tb x20  Black 2 x 10    Cervical AROM rotation 10 x rotation 10x   hep hep hep      Lats on Oleg  6# 2 x 10   --- --- --      Tricep ext  6# 2 x 10   --- --- --  8# 2 x 10    Bicep Curls  1lb 2 x 10   --- ---- 1# R  0# L x20  3lbs 2 x 10    Pulleys  10 x 10sec   ---- --- ---      Nu step   L 1 10 min 10 min 10min L1 10min L1 10min L1 10 min 10 min    TB Scap retraction   Y tb 10x yellow 20x 20x yellow y tb x20 y tb x20 r tb  20x     TB ER, IR   y tb 10x yellow 20x Ir/er,ext, add x10 each y tb x20 y tb x20 r tb 20x     Scapular retraction with depression   10x 20x 2x10  supine/seated 2x10 supine 2x10 supine 20x sitting     Cervical Isometrics   4 x 10  hep hep hep      Justina Day 1    5x hep hep hep      Hip add squeeze     10x 5 sec [id number]   20 x 5 sec    SLR     2x10 2x10 2x10      Blue PB rollouts     13x 20x 20x Serratus punch        supine x 10  2 lbs 2 x 10    Sit to stand with UE assist         2 x 10    Step ups with SL balance         2 x 10    SLS on foam         3 x 10 sec each    Supine pec stretch       10x  manual        Modalities

## 2018-08-02 ENCOUNTER — OFFICE VISIT (OUTPATIENT)
Dept: PHYSICAL THERAPY | Facility: CLINIC | Age: 68
End: 2018-08-02
Payer: MEDICARE

## 2018-08-02 ENCOUNTER — TELEPHONE (OUTPATIENT)
Dept: FAMILY MEDICINE CLINIC | Facility: CLINIC | Age: 68
End: 2018-08-02

## 2018-08-02 DIAGNOSIS — M48.02 CERVICAL SPINAL STENOSIS: Primary | ICD-10-CM

## 2018-08-02 PROCEDURE — G8984 CARRY CURRENT STATUS: HCPCS

## 2018-08-02 PROCEDURE — 97110 THERAPEUTIC EXERCISES: CPT

## 2018-08-02 PROCEDURE — G8985 CARRY GOAL STATUS: HCPCS

## 2018-08-02 PROCEDURE — 97112 NEUROMUSCULAR REEDUCATION: CPT

## 2018-08-02 NOTE — PROGRESS NOTES
Daily Note     Today's date: 2018  Patient name: Hedy Briones  : 1950  MRN: [id number]  Referring provider: Iliana Alves PA-C  Dx:   Encounter Diagnosis     ICD-10-CM    1   Cervical spinal stenosis M48 02                   Subjective: "I am feeling stronger "        Objective: See treatment diary below    Precautions : Hx of cervical spine surgery (18) : ONSET - 16    Manual             LE stretching 5 min            UE stretching  5 min                                                      Exercise Diary   7/5 7/10 7/12 7/17 7/20 7/23 7/26 7/30 8/2   Row with rotation - [place] 4# 2 x 10 4 # 2 x 10   --- -- --  6# 2 x 10 --   bridges 4 x 5 4 x 5   --- --- --  2 x10 ---   Mod LE lowering 4 x 5 4 x 5   --- -- --  2 x 10 --   Clam shells  Blue 2 x 10 Blue 2 x 10   Blue tb x20 bl tb x20 bl tb x20  Black 2 x 10 bl tb x20   Cervical AROM rotation 10 x rotation 10x   hep hep hep   hep   Lats on [place]  6# 2 x 10   --- --- --   --   Tricep ext  6# 2 x 10   --- --- --  8# 2 x 10 Red tb x20   Bicep Curls  1lb 2 x 10   --- ---- 1# R  0# L x20  3lbs 2 x 10 3# x20   Pulleys  10 x 10sec   ---- --- ---   ---   Nu step   L 1 10 min 10 min 10min L1 10min L1 10min L1 10 min 10 min 12 min L1   TB Scap retraction   Y tb 10x yellow 20x 20x yellow y tb x20 y tb x20 r tb  20x  Red tb x20   TB ER, IR   y tb 10x yellow 20x Ir/er,ext, add x10 each y tb x20 y tb x20 r tb 20x  Red tb x20   Scapular retraction with depression   10x 20x 2x10  supine/seated 2x10 supine 2x10 supine 20x sitting  2x10 supine   Cervical Isometrics   4 x 10  hep hep hep   hep   Justina Day 1    5x hep hep hep   hep   Hip add squeeze     10x 5 sec [id number]   20 x 5 sec 20x 5 sec   SLR     2x10 2x10 2x10   2x10   Blue PB rollouts     13x 20x 20x   --   Serratus punch        supine x 10  2 lbs 2 x 10 2# x10   Sit to stand with UE assist         2 x 10 10x chair no UE support   Step ups with SL balance         2 x 10 ---   SLS on foam 3 x 10 sec each --   Supine pec stretch       10x  manual ---       Modalities                                                         Assessment: Tolerated treatment well  Patient demonstrated fatigue post treatment      Plan: Progress treatment as tolerated

## 2018-08-02 NOTE — TELEPHONE ENCOUNTER
[place]- she had a cervical disc removed on May 1 2018 and just wanted to let you know "that you were right that everything turned out fine"  And wanted to thank you for all the emotional support you gave her

## 2018-08-06 ENCOUNTER — OFFICE VISIT (OUTPATIENT)
Dept: PHYSICAL THERAPY | Facility: CLINIC | Age: 68
End: 2018-08-06
Payer: MEDICARE

## 2018-08-06 DIAGNOSIS — M48.02 CERVICAL SPINAL STENOSIS: Primary | ICD-10-CM

## 2018-08-06 PROCEDURE — 97110 THERAPEUTIC EXERCISES: CPT | Performed by: PHYSICAL THERAPIST

## 2018-08-06 PROCEDURE — 97112 NEUROMUSCULAR REEDUCATION: CPT | Performed by: PHYSICAL THERAPIST

## 2018-08-06 PROCEDURE — G8983 BODY POS D/C STATUS: HCPCS | Performed by: PHYSICAL THERAPIST

## 2018-08-06 PROCEDURE — G8982 BODY POS GOAL STATUS: HCPCS | Performed by: PHYSICAL THERAPIST

## 2018-08-06 NOTE — PROGRESS NOTES
PT Discharge    Today's date: 2018  Patient name: Rylee Trammell  : 1950  MRN: [id number]  Referring provider: Ana Recio PA-C  Dx: No diagnosis found  Assessment    Assessment details: Rylee Trammell has been seen for No diagnosis found ,and has met the goals of physical therapy  And is independent with a HEP  Plan  Plan details: Discontinue PT        Subjective Evaluation    History of Present Illness  Mechanism of injury: My neck and functional level are great, I have returned to a very active lifestyle          Objective   Precautions Spinal fusion    Specialty Daily Treatment Diary     Manual                                                     Exercise Diary         Nustep 12 min L1       TB Scap retraction Y 2x10       TB scap depresion y 2x10       Pectoralis corner stretch 10x       Postural Re-education emph sternal elevation                                                                                                                                   Modalities

## 2018-08-09 ENCOUNTER — APPOINTMENT (OUTPATIENT)
Dept: PHYSICAL THERAPY | Facility: CLINIC | Age: 68
End: 2018-08-09
Payer: MEDICARE

## 2018-08-13 ENCOUNTER — APPOINTMENT (OUTPATIENT)
Dept: PHYSICAL THERAPY | Facility: CLINIC | Age: 68
End: 2018-08-13
Payer: MEDICARE

## 2018-08-16 ENCOUNTER — APPOINTMENT (OUTPATIENT)
Dept: PHYSICAL THERAPY | Facility: CLINIC | Age: 68
End: 2018-08-16
Payer: MEDICARE

## 2018-08-31 ENCOUNTER — OFFICE VISIT (OUTPATIENT)
Dept: FAMILY MEDICINE CLINIC | Facility: CLINIC | Age: 68
End: 2018-08-31
Payer: MEDICARE

## 2018-08-31 VITALS
HEART RATE: 50 BPM | OXYGEN SATURATION: 97 % | SYSTOLIC BLOOD PRESSURE: 148 MMHG | HEIGHT: 63 IN | DIASTOLIC BLOOD PRESSURE: 80 MMHG | RESPIRATION RATE: 16 BRPM | BODY MASS INDEX: 26.05 KG/M2 | TEMPERATURE: 98.4 F | WEIGHT: 147 LBS

## 2018-08-31 DIAGNOSIS — M62.838 CERVICAL PARASPINAL MUSCLE SPASM: Primary | ICD-10-CM

## 2018-08-31 PROCEDURE — 99213 OFFICE O/P EST LOW 20 MIN: CPT | Performed by: FAMILY MEDICINE

## 2018-08-31 RX ORDER — MUPIROCIN CALCIUM 20 MG/G
CREAM TOPICAL
COMMUNITY
Start: 2018-03-12 | End: 2018-10-02 | Stop reason: ALTCHOICE

## 2018-08-31 RX ORDER — PANTOPRAZOLE SODIUM 40 MG/1
1 TABLET, DELAYED RELEASE ORAL
COMMUNITY
Start: 2014-04-24 | End: 2018-08-31 | Stop reason: SDUPTHER

## 2018-08-31 RX ORDER — SENNA AND DOCUSATE SODIUM 50; 8.6 MG/1; MG/1
1 TABLET, FILM COATED ORAL
COMMUNITY
Start: 2018-05-02 | End: 2018-11-21 | Stop reason: ALTCHOICE

## 2018-08-31 RX ORDER — INGENOL MEBUTATE 150 UG/G
GEL TOPICAL
COMMUNITY
Start: 2018-07-16 | End: 2020-01-07 | Stop reason: ALTCHOICE

## 2018-08-31 RX ORDER — NICOTINE POLACRILEX 2 MG
5 GUM BUCCAL
COMMUNITY
End: 2018-08-31 | Stop reason: SDUPTHER

## 2018-08-31 RX ORDER — TIZANIDINE HYDROCHLORIDE 2 MG/1
2-4 CAPSULE, GELATIN COATED ORAL 3 TIMES DAILY
COMMUNITY
Start: 2018-05-02 | End: 2018-11-21 | Stop reason: ALTCHOICE

## 2018-08-31 NOTE — PROGRESS NOTES
Assessment/Plan:   Cervical muscular spasm  Apply heat p r n  for spasm  Take a muscle relaxant before bed p r n     Can also restart Celebrex for few weeks until the spasm subsides  Call with any questions or concerns  Call immediately for change in symptomatology  Subjective:     Patient ID: Guillaume Davis is a 74 y o  female  This is a 62-year-old female who presents with some muscular spasm after cervical disc surgery  This may be related to the patient's photography hobby when she was using a 600 mm lens  Review of Systems   Constitutional: Negative  Respiratory: Negative  Cardiovascular: Negative  Musculoskeletal: Neck pain: Cervical muscular spasm  Neurological: Negative  Objective:     Physical Exam   Constitutional: She is oriented to person, place, and time  She appears well-developed and well-nourished  HENT:   Head: Normocephalic and atraumatic  Musculoskeletal: Normal range of motion  She exhibits no edema or deformity  Neurological: She is alert and oriented to person, place, and time  No cranial nerve deficit  Psychiatric: She has a normal mood and affect   Her behavior is normal  Judgment and thought content normal

## 2018-09-04 DIAGNOSIS — M62.838 CERVICAL PARASPINAL MUSCLE SPASM: Primary | ICD-10-CM

## 2018-09-04 RX ORDER — CELECOXIB 200 MG/1
200 CAPSULE ORAL 2 TIMES DAILY
Qty: 60 CAPSULE | Refills: 0 | Status: SHIPPED | OUTPATIENT
Start: 2018-09-04 | End: 2018-11-21 | Stop reason: ALTCHOICE

## 2018-09-19 ENCOUNTER — CLINICAL SUPPORT (OUTPATIENT)
Dept: FAMILY MEDICINE CLINIC | Facility: CLINIC | Age: 68
End: 2018-09-19
Payer: MEDICARE

## 2018-09-19 DIAGNOSIS — Z23 NEED FOR INFLUENZA VACCINATION: Primary | ICD-10-CM

## 2018-09-19 PROCEDURE — G0008 ADMIN INFLUENZA VIRUS VAC: HCPCS

## 2018-09-19 PROCEDURE — 90662 IIV NO PRSV INCREASED AG IM: CPT

## 2018-09-21 ENCOUNTER — TELEPHONE (OUTPATIENT)
Dept: FAMILY MEDICINE CLINIC | Facility: CLINIC | Age: 68
End: 2018-09-21

## 2018-09-21 NOTE — TELEPHONE ENCOUNTER
Schedule an appointment next week  Jamal Tillman should use ice alternating with heat 10 minutes on every 3 hours

## 2018-09-21 NOTE — TELEPHONE ENCOUNTER
Was seen about 2 weeks ago for pain in her left arm  The pain eased up by using celebrex  Today the pain is quite bad  Yesterday was the 14th day that you told her to take the Celebrex  Do you want her to continue to take it or do you want to see her?

## 2018-09-25 ENCOUNTER — OFFICE VISIT (OUTPATIENT)
Dept: FAMILY MEDICINE CLINIC | Facility: CLINIC | Age: 68
End: 2018-09-25
Payer: MEDICARE

## 2018-09-25 VITALS
DIASTOLIC BLOOD PRESSURE: 60 MMHG | OXYGEN SATURATION: 97 % | BODY MASS INDEX: 27.64 KG/M2 | HEART RATE: 55 BPM | TEMPERATURE: 97.3 F | RESPIRATION RATE: 16 BRPM | SYSTOLIC BLOOD PRESSURE: 118 MMHG | HEIGHT: 62 IN | WEIGHT: 150.2 LBS

## 2018-09-25 DIAGNOSIS — M62.838 CERVICAL PARASPINAL MUSCLE SPASM: Primary | ICD-10-CM

## 2018-09-25 PROCEDURE — 99213 OFFICE O/P EST LOW 20 MIN: CPT | Performed by: FAMILY MEDICINE

## 2018-09-25 NOTE — PROGRESS NOTES
Assessment/Plan:     Cervical disc surgery  Cervical muscle spasm  Progressing well  Avoid lifting  Avoid working overhead  Follow-up for CPX 1 week  Call with any questions or concerns  Subjective:     Patient ID: Buzz Pate is a 79 y o  female  This is a 78 yo female who present for follow up for some muscle spasm after a cervical discectomy  Review of Systems   Constitutional: Negative  Respiratory: Negative  Cardiovascular: Negative  Musculoskeletal: Negative  Neurological: Negative  Objective:     Physical Exam   Constitutional: She is oriented to person, place, and time  She appears well-developed and well-nourished  HENT:   Head: Normocephalic and atraumatic  Musculoskeletal: Normal range of motion  Neurological: She is alert and oriented to person, place, and time  No cranial nerve deficit   Coordination normal

## 2018-10-02 ENCOUNTER — OFFICE VISIT (OUTPATIENT)
Dept: FAMILY MEDICINE CLINIC | Facility: CLINIC | Age: 68
End: 2018-10-02
Payer: MEDICARE

## 2018-10-02 VITALS
OXYGEN SATURATION: 98 % | TEMPERATURE: 97.6 F | HEART RATE: 54 BPM | BODY MASS INDEX: 27.23 KG/M2 | HEIGHT: 62 IN | WEIGHT: 148 LBS | SYSTOLIC BLOOD PRESSURE: 110 MMHG | DIASTOLIC BLOOD PRESSURE: 64 MMHG | RESPIRATION RATE: 16 BRPM

## 2018-10-02 DIAGNOSIS — E04.1 THYROID CYST: Primary | ICD-10-CM

## 2018-10-02 DIAGNOSIS — Z13.0 SCREENING FOR DEFICIENCY ANEMIA: ICD-10-CM

## 2018-10-02 DIAGNOSIS — Z13.220 SCREENING FOR CHOLESTEROL LEVEL: ICD-10-CM

## 2018-10-02 DIAGNOSIS — I10 ESSENTIAL HYPERTENSION: ICD-10-CM

## 2018-10-02 DIAGNOSIS — Z13.29 SCREENING FOR THYROID DISORDER: ICD-10-CM

## 2018-10-02 DIAGNOSIS — Z00.00 INITIAL MEDICARE ANNUAL WELLNESS VISIT: ICD-10-CM

## 2018-10-02 LAB
SL AMB  POCT GLUCOSE, UA: 0
SL AMB LEUKOCYTE ESTERASE,UA: 0
SL AMB POCT BILIRUBIN,UA: 0
SL AMB POCT BLOOD,UA: 0
SL AMB POCT CLARITY,UA: CLEAR
SL AMB POCT COLOR,UA: YELLOW
SL AMB POCT KETONES,UA: 0
SL AMB POCT NITRITE,UA: 0
SL AMB POCT PH,UA: 6
SL AMB POCT SPECIFIC GRAVITY,UA: 1.01
SL AMB POCT URINE PROTEIN: 0
SL AMB POCT UROBILINOGEN: 0

## 2018-10-02 PROCEDURE — G0438 PPPS, INITIAL VISIT: HCPCS | Performed by: FAMILY MEDICINE

## 2018-10-02 PROCEDURE — 81003 URINALYSIS AUTO W/O SCOPE: CPT | Performed by: FAMILY MEDICINE

## 2018-10-02 NOTE — PROGRESS NOTES
Assessment and Plan:  Problem List Items Addressed This Visit     None        Health Maintenance Due   Topic Date Due    PT PLAN OF CARE  08/11/2018    Pneumococcal PPSV23/PCV13 65+ Years / Low and Medium Risk (2 of 2 - PPSV23) 11/22/2018         HPI:  There is no problem list on file for this patient      Past Medical History:   Diagnosis Date    Enthesopathy of lower extremity     last assessed 08/21/2015     Past Surgical History:   Procedure Laterality Date    LUMBAR LAMINECTOMY  2000     Family History   Problem Relation Age of Onset    Arthritis Mother     Cancer Mother     Esophageal cancer Mother     Lung cancer Father     Cancer Father     Substance Abuse Neg Hx     Mental illness Neg Hx      History   Smoking Status    Never Smoker   Smokeless Tobacco    Never Used     History   Alcohol Use No      History   Drug Use No         Current Outpatient Prescriptions   Medication Sig Dispense Refill    Cholecalciferol 2000 units CAPS Take 1 capsule by mouth      Cyanocobalamin (B-12) 1000 MCG TBCR Place under the tongue      losartan (COZAAR) 100 MG tablet Take 1 tablet (100 mg total) by mouth daily 90 tablet 3    Multiple Vitamins-Minerals (OCUVITE ADULT FORMULA PO) Take by mouth daily      pantoprazole (PROTONIX) 40 mg tablet Take 1 tablet by mouth Daily      B Complex Vitamins (VITAMIN B COMPLEX 100 IJ) Take by mouth      benzocaine-menthol (CEPACOL) 15-3 6 mg per lozenge 15 mg by Transmucosal route      Biotin 1 MG CAPS Take 5 mg by mouth      celecoxib (CeleBREX) 200 mg capsule Take 1 capsule (200 mg total) by mouth 2 (two) times a day (Patient not taking: Reported on 9/25/2018 ) 60 capsule 0    gabapentin (NEURONTIN) 300 mg capsule       hydrochlorothiazide (HYDRODIURIL) 12 5 mg tablet Take 1 tablet (12 5 mg total) by mouth daily for 90 days 90 tablet 3    Methylprednisolone 4 MG TBPK Use as directed on package (Patient not taking: Reported on 8/31/2018 ) 21 tablet 0    mupirocin (BACTROBAN) 2 % cream Apply topically      naproxen sodium (ALEVE) 220 MG tablet Take 220 mg by mouth      neomycin-polymyxin-hydrocortisone (CORTISPORIN) 0 35%-10,000 units/mL-1% otic suspension Administer 3 drops into ears      PICATO 0 015 % GEL       senna-docusate sodium (SENOKOT-S) 8 6-50 mg per tablet Take 1 tablet by mouth      TiZANidine (ZANAFLEX) 2 MG capsule Take 2-4 mg by mouth Three times a day       No current facility-administered medications for this visit  Allergies   Allergen Reactions    Amoxicillin GI Intolerance     Other reaction(s): Nausea and/or vomiting  Other reaction(s): Nausea and/or vomiting    Amoxicillin-Pot Clavulanate      Other reaction(s): Nausea and/or vomiting  AUGMENTIN    Erythromycin GI Intolerance     Other reaction(s): Nausea and/or vomiting  Annotation - [id number]: vomiting  Other reaction(s): Nausea and/or vomiting  Annotation - [id number]: vomiting     Immunization History   Administered Date(s) Administered    Influenza Split High Dose Preservative Free IM 10/04/2017    Influenza TIV (IM) 09/01/2016    Influenza, high dose seasonal 0 5 mL 09/19/2018    Pneumococcal Conjugate 13-Valent 11/22/2017    TD (adult) Preservative Free 03/08/2015       Patient Care Team:  Krystal Jones MD as PCP - General    Medicare Screening Tests and Risk Assessments:  Elvia Calixto is here for her Welcome to Medicare visit  Health Risk Assessment:  Patient rates overall health as good  Patient feels that their physical health rating is Slightly better  Eyesight was rated as Same  Hearing was rated as Same  Patient feels that their emotional and mental health rating is Much better  Pain experienced by patient in the last 7 days has been Some  Emotional/Mental Health:  Patient has been feeling nervous/anxious  PHQ-9 Depression Screening:    Frequency of the following problems over the past two weeks:      1   Little interest or pleasure in doing things: 0 - not at all 2  Feeling down, depressed, or hopeless: 0 - not at all  PHQ-2 Score: 0          Broken Bones/Falls: Fall Risk Assessment:    In the past year, patient has experienced: No history of falling in past year          Bladder/Bowel:  Patient has leaked urine accidently in the last six months  Patient reports no loss of bowel control  Immunizations:  Patient has had a flu vaccination within the last year  Patient has received a pneumonia shot  Patient has received a shingles shot  Patient has received tetanus/diphtheria shot  Home Safety:  Patient does not have trouble with stairs inside or outside of their home  Patient currently reports that there are no safety hazards present in home, working smoke alarms, working carbon monoxide detectors  Preventative Screenings:   Breast cancer screening performed, colon cancer screen completed, glaucoma eye exam completed,     Nutrition:  Current diet: Regular with servings of the following:    Medications:  Patient is currently taking over-the-counter supplements  Patient is able to manage medications  Lifestyle Choices:  Patient reports no tobacco use  Patient has not smoked or used tobacco in the past   Patient reports no alcohol use  Patient drives a vehicle  Patient wears seat belt  Current level of exercise of physical activity described by patient as: daily  Activities of Daily Living:  Can get out of bed by his or her self, able to dress self, able to make own meals, able to do own shopping, able to bathe self, can do own laundry/housekeeping, can manage own money, pay bills and track expenses    Previous Hospitalizations:  Hospitalization or ED visit in past 12 months  Number of hospitalizations within the last year: 1-2        Advanced Directives:  Patient has decided on a power of   Patient has spoken to designated power of   Patient has completed advanced directive            No exam data present    Physical Exam:  Review of Systems   Constitutional: Negative  HENT: Negative  Eyes: Negative  Respiratory: Negative  Cardiovascular: Negative  Gastrointestinal: Negative  Negative for bowel incontinence  Endocrine: Negative  Musculoskeletal: Negative  Neurological: Negative  Psychiatric/Behavioral: Negative  The patient is not nervous/anxious  Vitals:    10/02/18 1008   BP: 110/64   Pulse: (!) 54   Resp: 16   Temp: 97 6 °F (36 4 °C)   TempSrc: Temporal   SpO2: 98%   Weight: 67 1 kg (148 lb)   Height: 5' 2 25" (1 581 m)   Body mass index is 26 85 kg/m²  Physical Exam   Constitutional: She is oriented to person, place, and time  She appears well-developed and well-nourished  HENT:   Head: Normocephalic and atraumatic  Right Ear: External ear normal    Left Ear: External ear normal    Nose: Nose normal    Mouth/Throat: Oropharynx is clear and moist    Eyes: Pupils are equal, round, and reactive to light  Conjunctivae and EOM are normal    Neck: Normal range of motion  Neck supple  Cardiovascular: Normal rate, regular rhythm and intact distal pulses  Exam reveals no gallop and no friction rub  No murmur heard  Pulmonary/Chest: Effort normal  No respiratory distress  She has no wheezes  She has no rales  She exhibits no tenderness  Abdominal: Soft  Bowel sounds are normal  She exhibits no distension and no mass  There is no tenderness  There is no rebound and no guarding  No hernia  Musculoskeletal: Normal range of motion  She exhibits no edema, tenderness or deformity  Lymphadenopathy:     She has no cervical adenopathy  Neurological: She is alert and oriented to person, place, and time  No cranial nerve deficit   Coordination normal

## 2018-10-03 LAB
ALBUMIN SERPL-MCNC: 4.5 G/DL (ref 3.6–4.8)
ALBUMIN/GLOB SERPL: 2 {RATIO} (ref 1.2–2.2)
ALP SERPL-CCNC: 80 IU/L (ref 39–117)
ALT SERPL-CCNC: 18 IU/L (ref 0–32)
AST SERPL-CCNC: 24 IU/L (ref 0–40)
BASOPHILS # BLD AUTO: 0 X10E3/UL (ref 0–0.2)
BASOPHILS NFR BLD AUTO: 1 %
BILIRUB SERPL-MCNC: 0.4 MG/DL (ref 0–1.2)
BUN SERPL-MCNC: 15 MG/DL (ref 8–27)
BUN/CREAT SERPL: 25 (ref 12–28)
CALCIUM SERPL-MCNC: 10 MG/DL (ref 8.7–10.3)
CHLORIDE SERPL-SCNC: 100 MMOL/L (ref 96–106)
CHOLEST SERPL-MCNC: 221 MG/DL (ref 100–199)
CHOLEST/HDLC SERPL: 2 RATIO (ref 0–4.4)
CO2 SERPL-SCNC: 28 MMOL/L (ref 20–29)
CREAT SERPL-MCNC: 0.61 MG/DL (ref 0.57–1)
EOSINOPHIL # BLD AUTO: 0 X10E3/UL (ref 0–0.4)
EOSINOPHIL NFR BLD AUTO: 1 %
ERYTHROCYTE [DISTWIDTH] IN BLOOD BY AUTOMATED COUNT: 14.9 % (ref 12.3–15.4)
GLOBULIN SER-MCNC: 2.2 G/DL (ref 1.5–4.5)
GLUCOSE SERPL-MCNC: 87 MG/DL (ref 65–99)
HCT VFR BLD AUTO: 39.3 % (ref 34–46.6)
HDLC SERPL-MCNC: 108 MG/DL
HGB BLD-MCNC: 12.5 G/DL (ref 11.1–15.9)
IMM GRANULOCYTES # BLD: 0 X10E3/UL (ref 0–0.1)
IMM GRANULOCYTES NFR BLD: 0 %
LDLC SERPL CALC-MCNC: 103 MG/DL (ref 0–99)
LYMPHOCYTES # BLD AUTO: 1.6 X10E3/UL (ref 0.7–3.1)
LYMPHOCYTES NFR BLD AUTO: 37 %
MCH RBC QN AUTO: 27.5 PG (ref 26.6–33)
MCHC RBC AUTO-ENTMCNC: 31.8 G/DL (ref 31.5–35.7)
MCV RBC AUTO: 86 FL (ref 79–97)
MONOCYTES # BLD AUTO: 0.3 X10E3/UL (ref 0.1–0.9)
MONOCYTES NFR BLD AUTO: 8 %
NEUTROPHILS # BLD AUTO: 2.3 X10E3/UL (ref 1.4–7)
NEUTROPHILS NFR BLD AUTO: 53 %
PLATELET # BLD AUTO: 242 X10E3/UL (ref 150–379)
POTASSIUM SERPL-SCNC: 4.4 MMOL/L (ref 3.5–5.2)
PROT SERPL-MCNC: 6.7 G/DL (ref 6–8.5)
RBC # BLD AUTO: 4.55 X10E6/UL (ref 3.77–5.28)
SL AMB EGFR AFRICAN AMERICAN: 109 ML/MIN/1.73
SL AMB EGFR NON AFRICAN AMERICAN: 95 ML/MIN/1.73
SL AMB VLDL CHOLESTEROL CALC: 10 MG/DL (ref 5–40)
SODIUM SERPL-SCNC: 142 MMOL/L (ref 134–144)
TRIGL SERPL-MCNC: 49 MG/DL (ref 0–149)
TSH SERPL DL<=0.005 MIU/L-ACNC: 2.2 UIU/ML (ref 0.45–4.5)
WBC # BLD AUTO: 4.3 X10E3/UL (ref 3.4–10.8)

## 2018-11-15 ENCOUNTER — TELEPHONE (OUTPATIENT)
Dept: FAMILY MEDICINE CLINIC | Facility: CLINIC | Age: 68
End: 2018-11-15

## 2018-11-15 NOTE — TELEPHONE ENCOUNTER
DAVID  Called pt for update regard to the Thyroid Ultrasound  She said she just finished having an endoscopy  She will be calling to schedule the ultrasound in the next couple days  Extended the notification out another month    rt

## 2018-11-16 ENCOUNTER — TELEPHONE (OUTPATIENT)
Dept: FAMILY MEDICINE CLINIC | Facility: CLINIC | Age: 68
End: 2018-11-16

## 2018-11-21 ENCOUNTER — OFFICE VISIT (OUTPATIENT)
Dept: FAMILY MEDICINE CLINIC | Facility: CLINIC | Age: 68
End: 2018-11-21
Payer: MEDICARE

## 2018-11-21 VITALS
HEIGHT: 62 IN | BODY MASS INDEX: 27.42 KG/M2 | RESPIRATION RATE: 16 BRPM | HEART RATE: 80 BPM | WEIGHT: 149 LBS | DIASTOLIC BLOOD PRESSURE: 70 MMHG | OXYGEN SATURATION: 97 % | TEMPERATURE: 98 F | SYSTOLIC BLOOD PRESSURE: 124 MMHG

## 2018-11-21 DIAGNOSIS — R00.2 PALPITATIONS: ICD-10-CM

## 2018-11-21 PROCEDURE — 99213 OFFICE O/P EST LOW 20 MIN: CPT | Performed by: FAMILY MEDICINE

## 2018-11-21 NOTE — PROGRESS NOTES
Assessment/Plan:   Palpitations  Reviewed EKG of 4/18 which was normal   TSH in October of 2018 was 2 2  Recommended cardiology consultation  Call with any questions or concerns  Call immediately for change in symptomatology  Diagnoses and all orders for this visit:    Palpitations  -     Ambulatory referral to Cardiology; Future          Subjective:     Patient ID: Eusebio Orellana is a 79 y o  female  This is a 61-year-old female presents with several episodes palpitations  No chest pain  No shortness of breath  Review of Systems   Constitutional: Negative  Respiratory: Negative  Cardiovascular: Positive for palpitations  Negative for chest pain and leg swelling  Neurological: Negative  Objective:     Physical Exam   Constitutional: She is oriented to person, place, and time  She appears well-developed and well-nourished  HENT:   Head: Normocephalic and atraumatic  Cardiovascular: Normal rate, regular rhythm and normal heart sounds  Exam reveals no gallop and no friction rub  No murmur heard  Pulmonary/Chest: Effort normal and breath sounds normal  No respiratory distress  She has no wheezes  She has no rales  She exhibits no tenderness  Neurological: She is alert and oriented to person, place, and time  No cranial nerve deficit   Coordination normal

## 2018-12-04 DIAGNOSIS — E04.1 THYROID CYST: ICD-10-CM

## 2018-12-13 ENCOUNTER — TELEPHONE (OUTPATIENT)
Dept: FAMILY MEDICINE CLINIC | Facility: CLINIC | Age: 68
End: 2018-12-13

## 2018-12-13 DIAGNOSIS — E04.1 THYROID NODULE: Primary | ICD-10-CM

## 2018-12-13 NOTE — TELEPHONE ENCOUNTER
Wanted to let you know  She made an appointment with   Dr Kt Maki which you gave her this Dr anne Martínez  Appointment is at the end of January  The orders say Dr Thomas Ram - Which one should she see?

## 2019-01-29 ENCOUNTER — OFFICE VISIT (OUTPATIENT)
Dept: OTOLARYNGOLOGY | Facility: CLINIC | Age: 69
End: 2019-01-29
Payer: COMMERCIAL

## 2019-01-29 VITALS — HEIGHT: 62 IN | WEIGHT: 153.4 LBS | BODY MASS INDEX: 28.23 KG/M2

## 2019-01-29 DIAGNOSIS — R13.10 DYSPHAGIA, UNSPECIFIED TYPE: ICD-10-CM

## 2019-01-29 DIAGNOSIS — T16.2XXA FOREIGN BODY OF LEFT EAR, INITIAL ENCOUNTER: ICD-10-CM

## 2019-01-29 DIAGNOSIS — E04.1 THYROID NODULE: Primary | ICD-10-CM

## 2019-01-29 PROCEDURE — 99203 OFFICE O/P NEW LOW 30 MIN: CPT | Performed by: SPECIALIST

## 2019-01-29 PROCEDURE — 69200 CLEAR OUTER EAR CANAL: CPT | Performed by: SPECIALIST

## 2019-01-29 RX ORDER — CONJUGATED ESTROGENS 0.62 MG/G
CREAM VAGINAL 2 TIMES WEEKLY
COMMUNITY
Start: 2018-11-23

## 2019-01-29 NOTE — PROGRESS NOTES
Assessment/Plan:    Thyroid nodule  TSH 2 20  Reviewed thyroid US indicating right upper pole nodule 0 6 x 0 5 x 0 3 cm TR 4, right midpole 0 6 x 0 3 x 0 3 cm TR 4, left midpole 1 0 x 0 6 x 0 4 cm TR 4  Reviewed TRads and STEVE guidelines  Discussed [place] rating system, STEVE guidelines, and indications for further interventions  Reviewed options for treatment including repeat ultrasound in two years, FNAB of the nodule, or surgical removal of thyroid (lobectomy versus total thyroidectomy)  After discussion agreed to repeat thyroid US in two years  Foreign body of ear, left  Partial q-tip and cerumen left eac  Removed foreign body (cotton from q-tip) with alligator forceps and suction  Tolerated well  Hearing improved after removal   Discouraged use of q-tips  Follow up prn worsening symptoms  Dysphagia  Discussed most likely related to recent cervical spine surgery  If worsens to contact office for further evaluation  Post nasal drip, to follow up if worsens         Diagnoses and all orders for this visit:    Thyroid nodule    Foreign body of left ear, initial encounter    Dysphagia, unspecified type    Other orders  -     PREMARIN vaginal cream;   -     Foreign body removal          Subjective:      Patient ID: Asif Bob is a 79 y o  female  Ms Nilesh Acevedo presents today as a new patient consultation per Dr Radha Solano due to thyroid nodules  Thyroid nodules first found 3 to 4 years ago on an MRI of cervical spine  Denies mass effect symptoms  Recent thyroid US  Additional ENT concerns include left ear blocked  No otalgia or otorrhea  Q-tip user  Occasional swallowing concerns after recent discectomy but is improving  Also having post nasal drip recently with URI            The following portions of the patient's history were reviewed and updated as appropriate: allergies, current medications, past family history, past medical history, past social history, past surgical history and problem list     Review of Systems   Constitutional: Negative  HENT: Positive for hearing loss  Negative for congestion, ear discharge, ear pain, nosebleeds, postnasal drip, rhinorrhea, sinus pain, sinus pressure, sore throat, tinnitus and voice change  Eyes: Negative  Respiratory: Negative for chest tightness and shortness of breath  Cardiovascular: Negative  Gastrointestinal: Negative  Endocrine: Negative  Musculoskeletal: Negative  Skin: Negative for color change  Neurological: Negative for dizziness, numbness and headaches  Psychiatric/Behavioral: Negative  Objective:      Ht 5' 2 25" (1 581 m)   Wt 69 6 kg (153 lb 6 4 oz)   BMI 27 83 kg/m²            Physical Exam   Constitutional: She is oriented to person, place, and time  She appears well-developed and well-nourished  She is cooperative  HENT:   Head: Normocephalic  Right Ear: Hearing, tympanic membrane, external ear and ear canal normal  No drainage or tenderness  Tympanic membrane is not perforated and not erythematous  No decreased hearing is noted  Left Ear: Hearing, tympanic membrane, external ear and ear canal normal  No drainage or tenderness  Tympanic membrane is not perforated and not erythematous  No decreased hearing is noted  Nose: Nose normal  No sinus tenderness, nasal deformity or septal deviation  Mouth/Throat: Uvula is midline, oropharynx is clear and moist and mucous membranes are normal  Mucous membranes are not pale and not dry  No oral lesions  Normal dentition  No oropharyngeal exudate  Foreign body left eac with cerumen   Neck: Normal range of motion and full passive range of motion without pain  Neck supple  No tracheal deviation present  Cardiovascular: Normal rate  Pulmonary/Chest: Effort normal  No accessory muscle usage  No respiratory distress  Musculoskeletal:        Right shoulder: She exhibits normal range of motion     Lymphadenopathy:     She has no cervical adenopathy  Neurological: She is alert and oriented to person, place, and time  No cranial nerve deficit or sensory deficit  Skin: Skin is warm, dry and intact  Psychiatric: She has a normal mood and affect  Foreign body removal  Date/Time: 1/29/2019 10:15 AM  Performed by: Sharla Ball  Authorized by: Carine Conner Protocol:Consent given by: patient  Patient identity confirmed: verbally with patient    Body area: ear  Location details: left ear    Sedation:  Patient sedated: no  Patient restrained: no  Removal mechanism: alligator forceps and suction  Complexity: simple  Post-procedure assessment: foreign body removed  Patient tolerance: Patient tolerated the procedure well with no immediate complications  Comments: Partial cotton, end of q-tip, surrounded by cerumen removed

## 2019-01-29 NOTE — ASSESSMENT & PLAN NOTE
Discussed most likely related to recent cervical spine surgery  If worsens to contact office for further evaluation      Post nasal drip, to follow up if worsens

## 2019-01-29 NOTE — ASSESSMENT & PLAN NOTE
Partial q-tip and cerumen left eac  Removed foreign body (cotton from q-tip) with alligator forceps and suction  Tolerated well  Hearing improved after removal   Discouraged use of q-tips  Follow up prn worsening symptoms

## 2019-01-29 NOTE — ASSESSMENT & PLAN NOTE
TSH 2 20  Reviewed thyroid US indicating right upper pole nodule 0 6 x 0 5 x 0 3 cm TR 4, right midpole 0 6 x 0 3 x 0 3 cm TR 4, left midpole 1 0 x 0 6 x 0 4 cm TR 4  Reviewed TRads and STEVE guidelines  Discussed [place] rating system, STEVE guidelines, and indications for further interventions  Reviewed options for treatment including repeat ultrasound in two years, FNAB of the nodule, or surgical removal of thyroid (lobectomy versus total thyroidectomy)  After discussion agreed to repeat thyroid US in two years

## 2019-02-08 DIAGNOSIS — I10 ESSENTIAL HYPERTENSION: ICD-10-CM

## 2019-02-08 RX ORDER — HYDROCHLOROTHIAZIDE 12.5 MG/1
12.5 TABLET ORAL DAILY
Qty: 90 TABLET | Refills: 3 | Status: SHIPPED | OUTPATIENT
Start: 2019-02-08 | End: 2020-01-07 | Stop reason: SDUPTHER

## 2019-07-24 DIAGNOSIS — I10 ESSENTIAL HYPERTENSION: ICD-10-CM

## 2019-07-24 RX ORDER — LOSARTAN POTASSIUM 100 MG/1
100 TABLET ORAL DAILY
Qty: 90 TABLET | Refills: 0 | Status: SHIPPED | OUTPATIENT
Start: 2019-07-24 | End: 2019-10-23 | Stop reason: SDUPTHER

## 2019-07-24 NOTE — TELEPHONE ENCOUNTER
Received a refill request for Losartan Potassium 100 mg tablets    Left message to return my call to see if she is planning on staying with the practice and if so, schedule an appointment for a med check      (did not put the refill through until she schedules)

## 2019-09-03 ENCOUNTER — OFFICE VISIT (OUTPATIENT)
Dept: FAMILY MEDICINE CLINIC | Facility: CLINIC | Age: 69
End: 2019-09-03
Payer: COMMERCIAL

## 2019-09-03 VITALS
HEIGHT: 63 IN | HEART RATE: 87 BPM | SYSTOLIC BLOOD PRESSURE: 136 MMHG | DIASTOLIC BLOOD PRESSURE: 70 MMHG | TEMPERATURE: 98.5 F | OXYGEN SATURATION: 98 % | BODY MASS INDEX: 27.11 KG/M2 | WEIGHT: 153 LBS

## 2019-09-03 DIAGNOSIS — I10 ESSENTIAL HYPERTENSION: Primary | ICD-10-CM

## 2019-09-03 DIAGNOSIS — R05.9 COUGH: ICD-10-CM

## 2019-09-03 DIAGNOSIS — K21.9 GASTROESOPHAGEAL REFLUX DISEASE WITHOUT ESOPHAGITIS: ICD-10-CM

## 2019-09-03 PROBLEM — M48.062 SPINAL STENOSIS OF LUMBAR REGION WITH NEUROGENIC CLAUDICATION: Status: ACTIVE | Noted: 2018-04-09

## 2019-09-03 PROBLEM — T16.2XXA FOREIGN BODY OF EAR, LEFT: Status: RESOLVED | Noted: 2019-01-29 | Resolved: 2019-09-03

## 2019-09-03 PROBLEM — K11.7 XEROSTOMIA DUE TO HYPOSECRETION OF SALIVARY GLAND: Status: ACTIVE | Noted: 2017-04-07

## 2019-09-03 PROBLEM — M53.3 COCCYDYNIA: Status: ACTIVE | Noted: 2018-04-09

## 2019-09-03 PROCEDURE — 99213 OFFICE O/P EST LOW 20 MIN: CPT | Performed by: FAMILY MEDICINE

## 2019-09-03 PROCEDURE — 3075F SYST BP GE 130 - 139MM HG: CPT | Performed by: FAMILY MEDICINE

## 2019-09-03 PROCEDURE — 3078F DIAST BP <80 MM HG: CPT | Performed by: FAMILY MEDICINE

## 2019-09-03 PROCEDURE — 1101F PT FALLS ASSESS-DOCD LE1/YR: CPT | Performed by: FAMILY MEDICINE

## 2019-09-03 NOTE — PROGRESS NOTES
Assessment/Plan:    Problem List Items Addressed This Visit        Digestive    Gastroesophageal reflux disease without esophagitis       Cardiovascular and Mediastinum    Hypertension - Primary       Other    Cough          BMI Counseling: Body mass index is 27 1 kg/m²  Discussed the patient's BMI with her  The BMI is above average  No BMI follow-up plan is appropriate  Patient is in an urgent or emergent medical situation  Patient Instructions   CONTINUE CURRENT TREATMENT PLAN  CONSIDER OTC CLARITIN OR FLONASE    IF COUGH DOES NOT IMPROVE, CONSIDER ADVAIR    CONTINUE CURRENT TREATMENT PLAN  MONITOR DIETARY SODIUM, CHOL INTAKE  ENCOURAGE PHYSICAL ACTIVITY      RV 6 M, SOONER PRN          Return in about 6 months (around 3/3/2020) for Recheck, Annual physical     Subjective:      Patient ID: Lilliana Wu is a 79 y o  female  Chief Complaint   Patient presents with    Medication Management    Cough     was sick for about a month - now left with a dry cough       PATIENT RETURNS FOR MEDICATION CHECK    DOING WELL  NO REAL CONCERNS  DENIES ANY CP, SOB, PALPITATIONS    HAS HAD A SL COUGH  SOME OTHER ALLERGIES ALSO      The following portions of the patient's history were reviewed and updated as appropriate: allergies, current medications, past family history, past medical history, past social history, past surgical history and problem list     Review of Systems   Constitutional: Negative for fever  HENT: Negative for congestion and sore throat  Eyes: Negative for discharge  Respiratory: Positive for cough  Negative for chest tightness  Cardiovascular: Negative for chest pain and palpitations  Gastrointestinal: Negative for abdominal pain, diarrhea, nausea and vomiting  Musculoskeletal: Negative for arthralgias and joint swelling  Neurological: Negative for numbness  Psychiatric/Behavioral: The patient is not nervous/anxious            Current Outpatient Medications   Medication Sig Dispense Refill    Cholecalciferol 2000 units CAPS Take 1 capsule by mouth      hydrochlorothiazide (HYDRODIURIL) 12 5 mg tablet Take 1 tablet (12 5 mg total) by mouth daily for 90 days 90 tablet 3    losartan (COZAAR) 100 MG tablet Take 1 tablet (100 mg total) by mouth daily 90 tablet 0    Multiple Vitamins-Minerals (OCUVITE ADULT FORMULA PO) Take by mouth daily      pantoprazole (PROTONIX) 40 mg tablet Take 1 tablet by mouth Daily      PREMARIN vaginal cream       B Complex Vitamins (VITAMIN B COMPLEX 100 IJ) Take by mouth      PICATO 0 015 % GEL        No current facility-administered medications for this visit  Objective:    /70 (BP Location: Left arm, Patient Position: Sitting, Cuff Size: Standard)   Pulse 87   Temp 98 5 °F (36 9 °C) (Temporal)   Ht 5' 3" (1 6 m)   Wt 69 4 kg (153 lb)   SpO2 98%   BMI 27 10 kg/m²        Physical Exam   Constitutional: She is oriented to person, place, and time  She appears well-developed and well-nourished  HENT:   Head: Normocephalic and atraumatic  Eyes: Pupils are equal, round, and reactive to light  Conjunctivae and EOM are normal  Right eye exhibits no discharge  Left eye exhibits no discharge  Neck: Normal range of motion  Neck supple  No thyromegaly present  Cardiovascular: Normal rate, regular rhythm and normal heart sounds  No murmur heard  Pulmonary/Chest: Effort normal and breath sounds normal  No respiratory distress  She has no wheezes  She has no rales  Abdominal: Soft  Bowel sounds are normal  There is no tenderness  Musculoskeletal: Normal range of motion  She exhibits no edema or tenderness  Lymphadenopathy:     She has no cervical adenopathy  Neurological: She is alert and oriented to person, place, and time  Skin: Skin is warm and dry  No rash noted  No erythema  Psychiatric: She has a normal mood and affect   Her behavior is normal  Judgment and thought content normal               Sherrie Pettit MD

## 2019-09-03 NOTE — PATIENT INSTRUCTIONS
CONTINUE CURRENT TREATMENT PLAN  CONSIDER OTC CLARITIN OR FLONASE    IF COUGH DOES NOT IMPROVE, CONSIDER ADVAIR    CONTINUE CURRENT TREATMENT PLAN  MONITOR DIETARY SODIUM, CHOL INTAKE  ENCOURAGE PHYSICAL ACTIVITY      RV 6 M, SOONER PRN

## 2019-10-08 ENCOUNTER — OFFICE VISIT (OUTPATIENT)
Dept: FAMILY MEDICINE CLINIC | Facility: CLINIC | Age: 69
End: 2019-10-08
Payer: COMMERCIAL

## 2019-10-08 VITALS
TEMPERATURE: 97.3 F | HEIGHT: 63 IN | OXYGEN SATURATION: 94 % | BODY MASS INDEX: 26.58 KG/M2 | WEIGHT: 150 LBS | RESPIRATION RATE: 18 BRPM | SYSTOLIC BLOOD PRESSURE: 128 MMHG | DIASTOLIC BLOOD PRESSURE: 80 MMHG | HEART RATE: 68 BPM

## 2019-10-08 DIAGNOSIS — M47.812 OSTEOARTHRITIS OF CERVICAL SPINE, UNSPECIFIED SPINAL OSTEOARTHRITIS COMPLICATION STATUS: ICD-10-CM

## 2019-10-08 DIAGNOSIS — G99.2 STENOSIS OF CERVICAL SPINE WITH MYELOPATHY (HCC): ICD-10-CM

## 2019-10-08 DIAGNOSIS — M48.02 STENOSIS OF CERVICAL SPINE WITH MYELOPATHY (HCC): ICD-10-CM

## 2019-10-08 DIAGNOSIS — S13.9XXA NECK SPRAIN, INITIAL ENCOUNTER: Primary | ICD-10-CM

## 2019-10-08 PROBLEM — M53.3 COCCYDYNIA: Status: RESOLVED | Noted: 2018-04-09 | Resolved: 2019-10-08

## 2019-10-08 PROBLEM — R05.9 COUGH: Status: RESOLVED | Noted: 2019-09-03 | Resolved: 2019-10-08

## 2019-10-08 PROCEDURE — 96372 THER/PROPH/DIAG INJ SC/IM: CPT | Performed by: FAMILY MEDICINE

## 2019-10-08 PROCEDURE — 99213 OFFICE O/P EST LOW 20 MIN: CPT | Performed by: FAMILY MEDICINE

## 2019-10-08 RX ORDER — DEXAMETHASONE SODIUM PHOSPHATE 4 MG/ML
4 INJECTION, SOLUTION INTRA-ARTICULAR; INTRALESIONAL; INTRAMUSCULAR; INTRAVENOUS; SOFT TISSUE ONCE
Status: COMPLETED | OUTPATIENT
Start: 2019-10-08 | End: 2019-10-08

## 2019-10-08 RX ORDER — KETOROLAC TROMETHAMINE 30 MG/ML
30 INJECTION, SOLUTION INTRAMUSCULAR; INTRAVENOUS ONCE
Status: COMPLETED | OUTPATIENT
Start: 2019-10-08 | End: 2019-10-08

## 2019-10-08 RX ADMIN — DEXAMETHASONE SODIUM PHOSPHATE 4 MG: 4 INJECTION, SOLUTION INTRA-ARTICULAR; INTRALESIONAL; INTRAMUSCULAR; INTRAVENOUS; SOFT TISSUE at 10:48

## 2019-10-08 RX ADMIN — KETOROLAC TROMETHAMINE 30 MG: 30 INJECTION, SOLUTION INTRAMUSCULAR; INTRAVENOUS at 10:49

## 2019-10-08 NOTE — PATIENT INSTRUCTIONS
REST  WARM COMPRESS  PASSIVE STRETCHING    CALL TOMORROW WITH UPDATE    RV IF SYMPTOMS PERSIST OF WORSEN

## 2019-10-08 NOTE — PROGRESS NOTES
Assessment/Plan:    1  Neck sprain, initial encounter  -     dexamethasone (DECADRON) injection 4 mg  -     ketorolac (TORADOL) 60 mg/2 mL IM injection 30 mg    2  Osteoarthritis of cervical spine, unspecified spinal osteoarthritis complication status  -     dexamethasone (DECADRON) injection 4 mg  -     ketorolac (TORADOL) 60 mg/2 mL IM injection 30 mg    3  Stenosis of cervical spine with myelopathy (HCC)          BMI Counseling: Body mass index is 26 57 kg/m²  Discussed the patient's BMI with her  The BMI is above normal  Nutrition recommendations include decreasing overall calorie intake  Patient Instructions   REST  WARM COMPRESS  PASSIVE STRETCHING    CALL TOMORROW WITH UPDATE    RV IF SYMPTOMS PERSIST OF WORSEN      Return if symptoms worsen or fail to improve, for Next scheduled follow up  Subjective:      Patient ID: Ross Atkinson is a 81 y o  female  Chief Complaint   Patient presents with    left side neck pain     started today       Neck Pain    This is a new problem  The current episode started today  The problem occurs constantly  The problem has been gradually worsening  The pain is associated with a twisting injury  The pain is present in the left side  The quality of the pain is described as aching  The pain is mild  The symptoms are aggravated by position and swallowing  The pain is same all the time  Associated symptoms include trouble swallowing  Pertinent negatives include no chest pain, fever, headaches, leg pain, numbness, pain with swallowing, paresis, photophobia, syncope, tingling, visual change, weakness or weight loss  She has tried nothing for the symptoms  The following portions of the patient's history were reviewed and updated as appropriate: allergies, current medications, past family history, past medical history, past social history, past surgical history and problem list     Review of Systems   Constitutional: Negative for fever and weight loss     HENT: Positive for trouble swallowing  Negative for congestion and sore throat  Eyes: Negative for photophobia and discharge  Respiratory: Negative for chest tightness  Cardiovascular: Negative for chest pain, palpitations and syncope  Gastrointestinal: Negative for abdominal pain, diarrhea, nausea and vomiting  Musculoskeletal: Positive for neck pain  Negative for arthralgias and joint swelling  Neurological: Negative for tingling, weakness, numbness and headaches  Psychiatric/Behavioral: The patient is not nervous/anxious  Current Outpatient Medications   Medication Sig Dispense Refill    Cholecalciferol 2000 units CAPS Take 1 capsule by mouth      hydrochlorothiazide (HYDRODIURIL) 12 5 mg tablet Take 1 tablet (12 5 mg total) by mouth daily for 90 days 90 tablet 3    losartan (COZAAR) 100 MG tablet Take 1 tablet (100 mg total) by mouth daily 90 tablet 0    Methylcobalamin (B12-ACTIVE PO) Take by mouth      Multiple Vitamins-Minerals (OCUVITE ADULT FORMULA PO) Take by mouth daily      pantoprazole (PROTONIX) 40 mg tablet Take 1 tablet by mouth Daily      PREMARIN vaginal cream       PICATO 0 015 % GEL        Current Facility-Administered Medications   Medication Dose Route Frequency Provider Last Rate Last Dose    dexamethasone (DECADRON) injection 4 mg  4 mg Intramuscular Once Jacinto Latham MD        ketorolac (TORADOL) 60 mg/2 mL IM injection 30 mg  30 mg Intramuscular Once Jacinto Latham MD           Objective:    /80   Pulse 68   Temp (!) 97 3 °F (36 3 °C) (Temporal)   Resp 18   Ht 5' 3" (1 6 m)   Wt 68 kg (150 lb)   SpO2 94%   BMI 26 57 kg/m²        Physical Exam   Constitutional: She is oriented to person, place, and time  She appears well-developed and well-nourished  HENT:   Head: Normocephalic and atraumatic  Eyes: Pupils are equal, round, and reactive to light  Conjunctivae and EOM are normal  Right eye exhibits no discharge  Left eye exhibits no discharge     Neck: Normal range of motion  Neck supple  No thyromegaly present  Cardiovascular: Normal rate, regular rhythm and normal heart sounds  No murmur heard  Pulmonary/Chest: Effort normal and breath sounds normal  No respiratory distress  She has no wheezes  She has no rales  Abdominal: Soft  Bowel sounds are normal  There is no tenderness  Musculoskeletal: She exhibits no edema or tenderness  DECREASED ROM TO THE L  TENDERNESS ANT STERNOCLEIDOMASTOID ON L   Lymphadenopathy:     She has no cervical adenopathy  Neurological: She is alert and oriented to person, place, and time  She displays normal reflexes  No cranial nerve deficit or sensory deficit  She exhibits normal muscle tone  Coordination normal    Skin: Skin is warm and dry  No rash noted  No erythema  Psychiatric: She has a normal mood and affect   Her behavior is normal  Judgment and thought content normal               Deon Perdue MD

## 2019-10-09 ENCOUNTER — TELEPHONE (OUTPATIENT)
Dept: FAMILY MEDICINE CLINIC | Facility: CLINIC | Age: 69
End: 2019-10-09

## 2019-10-23 DIAGNOSIS — I10 ESSENTIAL HYPERTENSION: ICD-10-CM

## 2019-10-23 RX ORDER — LOSARTAN POTASSIUM 100 MG/1
TABLET ORAL
Qty: 90 TABLET | Refills: 0 | Status: SHIPPED | OUTPATIENT
Start: 2019-10-23 | End: 2020-01-07 | Stop reason: SDUPTHER

## 2019-10-24 ENCOUNTER — OFFICE VISIT (OUTPATIENT)
Dept: FAMILY MEDICINE CLINIC | Facility: CLINIC | Age: 69
End: 2019-10-24
Payer: COMMERCIAL

## 2019-10-24 VITALS
HEART RATE: 73 BPM | SYSTOLIC BLOOD PRESSURE: 128 MMHG | BODY MASS INDEX: 26.58 KG/M2 | OXYGEN SATURATION: 98 % | WEIGHT: 150 LBS | HEIGHT: 63 IN | DIASTOLIC BLOOD PRESSURE: 70 MMHG | TEMPERATURE: 98.2 F | RESPIRATION RATE: 16 BRPM

## 2019-10-24 DIAGNOSIS — Z23 NEED FOR INFLUENZA VACCINATION: ICD-10-CM

## 2019-10-24 DIAGNOSIS — G99.2 STENOSIS OF CERVICAL SPINE WITH MYELOPATHY (HCC): ICD-10-CM

## 2019-10-24 DIAGNOSIS — M47.812 OSTEOARTHRITIS OF CERVICAL SPINE, UNSPECIFIED SPINAL OSTEOARTHRITIS COMPLICATION STATUS: ICD-10-CM

## 2019-10-24 DIAGNOSIS — S13.9XXA NECK SPRAIN, INITIAL ENCOUNTER: Primary | ICD-10-CM

## 2019-10-24 DIAGNOSIS — M48.02 STENOSIS OF CERVICAL SPINE WITH MYELOPATHY (HCC): ICD-10-CM

## 2019-10-24 PROCEDURE — 90662 IIV NO PRSV INCREASED AG IM: CPT

## 2019-10-24 PROCEDURE — 3008F BODY MASS INDEX DOCD: CPT | Performed by: FAMILY MEDICINE

## 2019-10-24 PROCEDURE — G0008 ADMIN INFLUENZA VIRUS VAC: HCPCS

## 2019-10-24 PROCEDURE — 99213 OFFICE O/P EST LOW 20 MIN: CPT | Performed by: FAMILY MEDICINE

## 2019-10-24 RX ORDER — METHYLPREDNISOLONE 4 MG/1
TABLET ORAL
Qty: 21 EACH | Refills: 0 | Status: SHIPPED | OUTPATIENT
Start: 2019-10-24 | End: 2020-01-07 | Stop reason: ALTCHOICE

## 2019-10-24 NOTE — PATIENT INSTRUCTIONS
REST  WARM COMPRESSES TO NECK  TRIAL OF MEDROL DOSE PACK  PT    CONTACT ORTHO FOR RE CHECK    CALL NEXT WEEK WITH UPDATE

## 2019-10-24 NOTE — PROGRESS NOTES
Assessment/Plan:    No problem-specific Assessment & Plan notes found for this encounter  Diagnoses and all orders for this visit:    Neck sprain, initial encounter  -     Ambulatory referral to Physical Therapy; Future  -     methylPREDNISolone 4 MG tablet therapy pack; Use as directed on package    Osteoarthritis of cervical spine, unspecified spinal osteoarthritis complication status  -     Ambulatory referral to Physical Therapy; Future  -     methylPREDNISolone 4 MG tablet therapy pack; Use as directed on package    Stenosis of cervical spine with myelopathy ([facility] [address] )  -     Ambulatory referral to Physical Therapy; Future  -     methylPREDNISolone 4 MG tablet therapy pack; Use as directed on package    Need for influenza vaccination  -     influenza vaccine, [id number], high-dose, PF 0 5 mL (FLUZONE HIGH-DOSE)          Patient Instructions   REST  WARM COMPRESSES TO NECK  TRIAL OF MEDROL DOSE PACK  PT    CONTACT ORTHO FOR RE CHECK    CALL NEXT WEEK WITH UPDATE      Return if symptoms worsen or fail to improve  Subjective:      Patient ID: Dennis Calhoun is a 81 y o  female  Chief Complaint   Patient presents with    Follow-up     neck sprain, not feeling better    Flu Vaccine       Neck Pain    This is a recurrent problem  The current episode started 1 to 4 weeks ago  The problem occurs constantly  The problem has been gradually worsening  The pain is associated with a twisting injury  The pain is present in the right side  The quality of the pain is described as aching and shooting  The pain is mild  The symptoms are aggravated by coughing, position, twisting and swallowing  The pain is same all the time  Associated symptoms include numbness, pain with swallowing and tingling  Pertinent negatives include no chest pain, fever, headaches, leg pain, paresis, photophobia, syncope, trouble swallowing, visual change, weakness or weight loss  She has tried NSAIDs for the symptoms   The treatment provided mild relief  The following portions of the patient's history were reviewed and updated as appropriate: allergies, current medications, past family history, past medical history, past social history, past surgical history and problem list     Review of Systems   Constitutional: Negative for fever and weight loss  HENT: Negative for congestion, sore throat and trouble swallowing  Eyes: Negative for photophobia and discharge  Respiratory: Negative for chest tightness  Cardiovascular: Negative for chest pain, palpitations and syncope  Gastrointestinal: Negative for abdominal pain, diarrhea, nausea and vomiting  Musculoskeletal: Positive for neck pain  Negative for arthralgias and joint swelling  Neurological: Positive for tingling and numbness  Negative for weakness and headaches  Psychiatric/Behavioral: The patient is not nervous/anxious  Current Outpatient Medications   Medication Sig Dispense Refill    Cholecalciferol 2000 units CAPS Take 1 capsule by mouth      hydrochlorothiazide (HYDRODIURIL) 12 5 mg tablet Take 1 tablet (12 5 mg total) by mouth daily for 90 days 90 tablet 3    losartan (COZAAR) 100 MG tablet TAKE ONE TABLET BY MOUTH DAILY 90 tablet 0    Methylcobalamin (B12-ACTIVE PO) Take by mouth      Multiple Vitamins-Minerals (OCUVITE ADULT FORMULA PO) Take by mouth daily      pantoprazole (PROTONIX) 40 mg tablet Take 1 tablet by mouth Daily      PICATO 0 015 % GEL       PREMARIN vaginal cream       methylPREDNISolone 4 MG tablet therapy pack Use as directed on package 21 each 0     No current facility-administered medications for this visit  Objective:    /70   Pulse 73   Temp 98 2 °F (36 8 °C) (Temporal)   Resp 16   Ht 5' 2 5" (1 588 m)   Wt 68 kg (150 lb)   SpO2 98%   BMI 27 00 kg/m²        Physical Exam   Constitutional: She is oriented to person, place, and time  She appears well-developed and well-nourished     HENT:   Head: Normocephalic and atraumatic  Eyes: Pupils are equal, round, and reactive to light  Conjunctivae and EOM are normal  Right eye exhibits no discharge  Left eye exhibits no discharge  Neck: Normal range of motion  Neck supple  No thyromegaly present  Cardiovascular: Normal rate, regular rhythm and normal heart sounds  No murmur heard  Pulmonary/Chest: Effort normal and breath sounds normal  No respiratory distress  She has no wheezes  She has no rales  Abdominal: Soft  Bowel sounds are normal  There is no tenderness  Musculoskeletal: She exhibits tenderness  She exhibits no edema  Mild decrease rom of neck  Tenderness l side of neck to palpation   Lymphadenopathy:     She has no cervical adenopathy  Neurological: She is alert and oriented to person, place, and time  She displays normal reflexes  No cranial nerve deficit or sensory deficit  She exhibits normal muscle tone  Coordination normal    Skin: Skin is warm and dry  No rash noted  No erythema  Psychiatric: She has a normal mood and affect   Her behavior is normal  Judgment and thought content normal               Ana Tejeda MD

## 2019-10-29 ENCOUNTER — EVALUATION (OUTPATIENT)
Dept: PHYSICAL THERAPY | Facility: CLINIC | Age: 69
End: 2019-10-29

## 2019-10-29 DIAGNOSIS — S13.9XXA NECK SPRAIN, INITIAL ENCOUNTER: ICD-10-CM

## 2019-10-29 DIAGNOSIS — G99.2 STENOSIS OF CERVICAL SPINE WITH MYELOPATHY (HCC): ICD-10-CM

## 2019-10-29 DIAGNOSIS — M48.02 STENOSIS OF CERVICAL SPINE WITH MYELOPATHY (HCC): ICD-10-CM

## 2019-10-29 DIAGNOSIS — M47.812 OSTEOARTHRITIS OF CERVICAL SPINE, UNSPECIFIED SPINAL OSTEOARTHRITIS COMPLICATION STATUS: ICD-10-CM

## 2019-10-29 NOTE — PROGRESS NOTES
PT Evaluation     Today's date: 10/29/2019  Patient name: Ofe Hui  : 1950  MRN: [id number]  Referring provider: Anil Robertson MD  Dx:   Encounter Diagnosis     ICD-10-CM    1  Neck sprain, initial encounter S13  9XXA Ambulatory referral to Physical Therapy   2  Osteoarthritis of cervical spine, unspecified spinal osteoarthritis complication status [id number] Ambulatory referral to Physical Therapy   3  Stenosis of cervical spine with myelopathy (HCC) M48 02 Ambulatory referral to Physical Therapy    G99 2                   Assessment  Assessment details: The patient presents with pain and altered swallowing ability  She describes a strange feeling in the left mid-cervical region that radiates to the mandible and to her anterior chest   She had a synthetic disc at C5-6 implanted 2018  Due to her history, I do not recommend PT at this time  I instructed the patient to return to her neuro-surgeon to rule out any disruption of the implant  Impairments: abnormal or restricted ROM, activity intolerance, impaired physical strength, lacks appropriate home exercise program, pain with function and poor posture     Plan  Plan details: Hold PT for medical clearance  Subjective Evaluation    History of Present Illness  Mechanism of injury: Patient has a synthetic disc C5-6 implanted in 2018, was doing well and 3 weeks ago she developed neck pain  She believes it may be related to turning to the left while lying down  She developed anterior neck pain with pressure when swallowing, and her voice became hoarse  She was evaluated by Dr Gabriela Childress, injection of anti-inflammatory relieves the pain by 75%  However, the  swallowing is still not normal, there is continued anterior neck to mandibular pain              Recurrent probem    Pain  Current pain rating: 3  At best pain rating: 3  At worst pain ratin  Location: left anterior cervical area  Quality: pressure          Objective     Concurrent Complaints  Positive for trouble swallowing   Negative for night pain and disturbed sleep    Active Range of Motion   Cervical/Thoracic Spine       Cervical    Flexion:  Restriction level: moderate  Extension:  Restriction level: moderate  Left rotation:  Restriction level: moderate  Right rotation:  Restriction level: moderate      Flowsheet Rows      Most Recent Value   PT/OT G-Codes   Current Score  34   Projected Score  46             Precautions: Synthetic disc C5-6,       Manual                                                                                   Exercise Diary                                                                                                                                                                                                                                                                                      Modalities

## 2019-10-30 ENCOUNTER — TELEPHONE (OUTPATIENT)
Dept: FAMILY MEDICINE CLINIC | Facility: CLINIC | Age: 69
End: 2019-10-30

## 2019-10-30 NOTE — TELEPHONE ENCOUNTER
You order methylPREDNISolone 4 MG tablet therapy pack  Took the last dose today  It was not effective as she thought it would be  She did see th Physical Therapist yesterday, he did not want to touch her with what it going to on with her neck until some imaging was done      She has an appointment on 11/5 with the neurosurgeons NP

## 2019-10-30 NOTE — TELEPHONE ENCOUNTER
PLEASE CALL BACK    I WOULD JUST TRY TO USE AN ANTI INFLAMMATORY SUCH AS ALEVE OR MOTRIN IF SHE CAN TOLERATE THAT UNTIL SHE IS SEEN BY HER NEUROSURGEON    THANKS

## 2019-10-31 NOTE — TELEPHONE ENCOUNTER
Spoke to Silvana Lala - relayed Dr Hernandez Byhood message    She said she thinks she can tolerate taking the aleve or motrin until the appointment

## 2019-12-12 ENCOUNTER — VBI (OUTPATIENT)
Dept: FAMILY MEDICINE CLINIC | Facility: CLINIC | Age: 69
End: 2019-12-12

## 2019-12-26 ENCOUNTER — TELEPHONE (OUTPATIENT)
Dept: FAMILY MEDICINE CLINIC | Facility: CLINIC | Age: 69
End: 2019-12-26

## 2019-12-26 NOTE — TELEPHONE ENCOUNTER
Pt is scheduled on Monday 12/30/19 for [place],     Can you please Place PENDING [facility] BW orders  [facility]    Thank you      rt

## 2019-12-30 ENCOUNTER — CLINICAL SUPPORT (OUTPATIENT)
Dept: FAMILY MEDICINE CLINIC | Facility: CLINIC | Age: 69
End: 2019-12-30
Payer: COMMERCIAL

## 2019-12-30 DIAGNOSIS — I10 ESSENTIAL HYPERTENSION: ICD-10-CM

## 2019-12-30 DIAGNOSIS — E78.2 MIXED HYPERLIPIDEMIA: ICD-10-CM

## 2019-12-30 DIAGNOSIS — E04.1 THYROID NODULE: Primary | ICD-10-CM

## 2019-12-30 PROCEDURE — 36415 COLL VENOUS BLD VENIPUNCTURE: CPT

## 2019-12-30 NOTE — TELEPHONE ENCOUNTER
Juan Daniel Davis, can you place [facility] orders for this patient while Dr Usha Bradley is away? I will draw 2 SST and one LAV tube  - since this patient is an AWV, I want to make sure the correct labs drawn will be covered  Place for [facility] please  Thanks!!!   Jessica Covert

## 2019-12-31 LAB
ALBUMIN SERPL-MCNC: 4.1 G/DL (ref 3.6–5.1)
ALBUMIN/GLOB SERPL: 2 (CALC) (ref 1–2.5)
ALP SERPL-CCNC: 58 U/L (ref 33–130)
ALT SERPL-CCNC: 15 U/L (ref 6–29)
AST SERPL-CCNC: 20 U/L (ref 10–35)
BILIRUB SERPL-MCNC: 0.6 MG/DL (ref 0.2–1.2)
BUN SERPL-MCNC: 20 MG/DL (ref 7–25)
BUN/CREAT SERPL: NORMAL (CALC) (ref 6–22)
CALCIUM SERPL-MCNC: 9.6 MG/DL (ref 8.6–10.4)
CHLORIDE SERPL-SCNC: 105 MMOL/L (ref 98–110)
CHOLEST SERPL-MCNC: 218 MG/DL
CHOLEST/HDLC SERPL: 2 (CALC)
CO2 SERPL-SCNC: 31 MMOL/L (ref 20–32)
CREAT SERPL-MCNC: 0.59 MG/DL (ref 0.5–0.99)
GLOBULIN SER CALC-MCNC: 2.1 G/DL (CALC) (ref 1.9–3.7)
GLUCOSE SERPL-MCNC: 86 MG/DL (ref 65–99)
HDLC SERPL-MCNC: 107 MG/DL
LDLC SERPL CALC-MCNC: 99 MG/DL (CALC)
NONHDLC SERPL-MCNC: 111 MG/DL (CALC)
POTASSIUM SERPL-SCNC: 5 MMOL/L (ref 3.5–5.3)
PROT SERPL-MCNC: 6.2 G/DL (ref 6.1–8.1)
SL AMB EGFR AFRICAN AMERICAN: 110 ML/MIN/1.73M2
SL AMB EGFR NON AFRICAN AMERICAN: 95 ML/MIN/1.73M2
SODIUM SERPL-SCNC: 140 MMOL/L (ref 135–146)
TRIGL SERPL-MCNC: 42 MG/DL
TSH SERPL-ACNC: 1.69 MIU/L (ref 0.4–4.5)

## 2020-01-07 ENCOUNTER — OFFICE VISIT (OUTPATIENT)
Dept: FAMILY MEDICINE CLINIC | Facility: CLINIC | Age: 70
End: 2020-01-07
Payer: COMMERCIAL

## 2020-01-07 VITALS
BODY MASS INDEX: 28.52 KG/M2 | HEART RATE: 52 BPM | HEIGHT: 62 IN | TEMPERATURE: 98 F | SYSTOLIC BLOOD PRESSURE: 134 MMHG | OXYGEN SATURATION: 96 % | DIASTOLIC BLOOD PRESSURE: 60 MMHG | RESPIRATION RATE: 14 BRPM | WEIGHT: 155 LBS

## 2020-01-07 DIAGNOSIS — I10 ESSENTIAL HYPERTENSION: Primary | ICD-10-CM

## 2020-01-07 DIAGNOSIS — M19.90 ARTHRITIS: ICD-10-CM

## 2020-01-07 DIAGNOSIS — I47.1 SVT (SUPRAVENTRICULAR TACHYCARDIA) (HCC): ICD-10-CM

## 2020-01-07 DIAGNOSIS — G99.2 STENOSIS OF CERVICAL SPINE WITH MYELOPATHY (HCC): ICD-10-CM

## 2020-01-07 DIAGNOSIS — Z00.00 MEDICARE ANNUAL WELLNESS VISIT, INITIAL: ICD-10-CM

## 2020-01-07 DIAGNOSIS — K21.9 GASTROESOPHAGEAL REFLUX DISEASE WITHOUT ESOPHAGITIS: ICD-10-CM

## 2020-01-07 DIAGNOSIS — Z13.29 SCREENING FOR HYPOTHYROIDISM: ICD-10-CM

## 2020-01-07 DIAGNOSIS — M48.02 STENOSIS OF CERVICAL SPINE WITH MYELOPATHY (HCC): ICD-10-CM

## 2020-01-07 DIAGNOSIS — Z12.31 ENCOUNTER FOR SCREENING MAMMOGRAM FOR BREAST CANCER: ICD-10-CM

## 2020-01-07 DIAGNOSIS — Z13.220 SCREENING FOR HYPERLIPIDEMIA: ICD-10-CM

## 2020-01-07 PROBLEM — I47.10 SVT (SUPRAVENTRICULAR TACHYCARDIA): Status: ACTIVE | Noted: 2020-01-07

## 2020-01-07 LAB
SL AMB  POCT GLUCOSE, UA: 0
SL AMB LEUKOCYTE ESTERASE,UA: 0
SL AMB POCT BILIRUBIN,UA: 0
SL AMB POCT BLOOD,UA: 0
SL AMB POCT CLARITY,UA: CLEAR
SL AMB POCT COLOR,UA: YELLOW
SL AMB POCT KETONES,UA: 0
SL AMB POCT NITRITE,UA: 0
SL AMB POCT PH,UA: 5
SL AMB POCT SPECIFIC GRAVITY,UA: 1
SL AMB POCT URINE PROTEIN: 0
SL AMB POCT UROBILINOGEN: 0

## 2020-01-07 PROCEDURE — 1036F TOBACCO NON-USER: CPT | Performed by: FAMILY MEDICINE

## 2020-01-07 PROCEDURE — 99215 OFFICE O/P EST HI 40 MIN: CPT | Performed by: FAMILY MEDICINE

## 2020-01-07 PROCEDURE — G0438 PPPS, INITIAL VISIT: HCPCS | Performed by: FAMILY MEDICINE

## 2020-01-07 PROCEDURE — 1160F RVW MEDS BY RX/DR IN RCRD: CPT | Performed by: FAMILY MEDICINE

## 2020-01-07 PROCEDURE — 3078F DIAST BP <80 MM HG: CPT | Performed by: FAMILY MEDICINE

## 2020-01-07 PROCEDURE — 3725F SCREEN DEPRESSION PERFORMED: CPT | Performed by: FAMILY MEDICINE

## 2020-01-07 PROCEDURE — 1125F AMNT PAIN NOTED PAIN PRSNT: CPT | Performed by: FAMILY MEDICINE

## 2020-01-07 PROCEDURE — 3075F SYST BP GE 130 - 139MM HG: CPT | Performed by: FAMILY MEDICINE

## 2020-01-07 PROCEDURE — 81003 URINALYSIS AUTO W/O SCOPE: CPT | Performed by: FAMILY MEDICINE

## 2020-01-07 PROCEDURE — 3008F BODY MASS INDEX DOCD: CPT | Performed by: FAMILY MEDICINE

## 2020-01-07 PROCEDURE — 1170F FXNL STATUS ASSESSED: CPT | Performed by: FAMILY MEDICINE

## 2020-01-07 RX ORDER — LOSARTAN POTASSIUM 100 MG/1
100 TABLET ORAL DAILY
Qty: 90 TABLET | Refills: 1 | Status: SHIPPED | OUTPATIENT
Start: 2020-01-07 | End: 2020-04-18

## 2020-01-07 RX ORDER — HYDROCHLOROTHIAZIDE 12.5 MG/1
12.5 TABLET ORAL DAILY
Qty: 90 TABLET | Refills: 1 | Status: SHIPPED | OUTPATIENT
Start: 2020-01-07 | End: 2020-07-07 | Stop reason: SDUPTHER

## 2020-01-07 NOTE — PROGRESS NOTES
Assessment and Plan:     Problem List Items Addressed This Visit        Digestive    Gastroesophageal reflux disease without esophagitis       Cardiovascular and Mediastinum    Hypertension - Primary    Relevant Medications    losartan (COZAAR) 100 MG tablet    hydrochlorothiazide (HYDRODIURIL) 12 5 mg tablet    Other Relevant Orders    POCT urine dip auto non-scope (Completed)    SVT (supraventricular tachycardia) (HCC)     STABLE  FOLLOWED BY CARDIOLOGY            Nervous and Auditory    Stenosis of cervical spine with myelopathy (HCC)       Musculoskeletal and Integument    Arthritis       Other    Medicare annual wellness visit, initial    Encounter for screening mammogram for breast cancer    Relevant Orders    Mammo screening bilateral w cad    Screening for hyperlipidemia    Screening for hypothyroidism           Preventive health issues were discussed with patient, and age appropriate screening tests were ordered as noted in patient's After Visit Summary  Personalized health advice and appropriate referrals for health education or preventive services given if needed, as noted in patient's After Visit Summary       History of Present Illness:     Patient presents for Medicare Annual Wellness visit    Patient Care Team:  Shaw Ruffin MD as PCP - General (Family Medicine)  Zahira Sofia MD (Gynecology)  Last Guerrero MD (Ophthalmology)  Loren Cobos (Cardiology)  Jodie Adkins MD (Gastroenterology)  Kate Mayo DO (Dermatology)  Magali Wade MD (Neurosurgery)  Manuela Major DPM (Podiatry)     Problem List:     Patient Active Problem List   Diagnosis    Thyroid nodule    Dysphagia    Arthritis    Spinal stenosis of lumbar region with neurogenic claudication    Enthesopathy of lower extremity    Hypertension    Osteoarthritis of cervical spine    Osteoarthritis of left knee    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    Gastroesophageal reflux disease without esophagitis    Neck sprain    Stenosis of cervical spine with myelopathy (HCC)    Medicare annual wellness visit, initial    Encounter for screening mammogram for breast cancer    Screening for hyperlipidemia    Screening for hypothyroidism    SVT (supraventricular tachycardia) ([facility] [address] )      Past Medical and Surgical History:     Past Medical History:   Diagnosis Date    Arthritis     Enthesopathy of lower extremity     last assessed 08/21/2015    GERD (gastroesophageal reflux disease)     High blood pressure     History of basal cell cancer     Ocular migraine     SVT [facility]supraventricular tachycardia) ([facility])      Past Surgical History:   Procedure Laterality Date    CERVICAL DISCECTOMY  2018    [address]  2000    MOHS SURGERY  2017    Basal Cell, [place]      Family History:     Family History   Problem Relation Age of Onset    Arthritis Mother     Cancer Mother     Esophageal cancer Mother     Lung cancer Father     Cancer Father     Hypertension Father     Stroke Maternal Grandmother     Heart disease Maternal Grandfather     Substance Abuse Neg Hx     Mental illness Neg Hx       Social History:     Social History     Socioeconomic History    Marital status:      Spouse name: None    Number of children: None    Years of education: None    Highest education level: None   Occupational History    None   Social Needs    Financial resource strain: None    Food insecurity:     Worry: None     Inability: None    Transportation needs:     Medical: None     Non-medical: None   Tobacco Use    Smoking status: Never Smoker    Smokeless tobacco: Never Used   Substance and Sexual Activity    Alcohol use: Yes     Frequency: 2-4 times a month     Comment: occ    Drug use: No    Sexual activity: None   Lifestyle    Physical activity:     Days per week: None     Minutes per session: None    Stress: None   Relationships    Social connections:     Talks on phone: None     Gets together: None     Attends [employer] service: None     Active member of club or organization: None     Attends meetings of clubs or organizations: None     Relationship status: None    Intimate partner violence:     Fear of current or ex partner: None     Emotionally abused: None     Physically abused: None     Forced sexual activity: None   Other Topics Concern    None   Social History Narrative    Active advance directive    Caffeine use    Dental care regularly    Drinks coffee       Medications and Allergies:     Current Outpatient Medications   Medication Sig Dispense Refill    Cholecalciferol 2000 units CAPS Take 1 capsule by mouth      hydrochlorothiazide (HYDRODIURIL) 12 5 mg tablet Take 1 tablet (12 5 mg total) by mouth daily 90 tablet 1    losartan (COZAAR) 100 MG tablet Take 1 tablet (100 mg total) by mouth daily 90 tablet 1    Methylcobalamin (B12-ACTIVE PO) Take by mouth      Multiple Vitamins-Minerals (OCUVITE ADULT FORMULA PO) Take by mouth daily      pantoprazole (PROTONIX) 40 mg tablet Take 1 tablet by mouth Daily      PREMARIN vaginal cream        No current facility-administered medications for this visit        Allergies   Allergen Reactions    Amoxicillin GI Intolerance     Other reaction(s): Nausea and/or vomiting  Other reaction(s): Nausea and/or vomiting    Amoxicillin-Pot Clavulanate GI Intolerance     Other reaction(s): Nausea and/or vomiting  AUGMENTIN    Erythromycin GI Intolerance     Other reaction(s): Nausea and/or vomiting  Annotation - [id number]: vomiting  Other reaction(s): Nausea and/or vomiting  Annotation - [id number]: vomiting      Immunizations:     Immunization History   Administered Date(s) Administered    Influenza Split High Dose Preservative Free IM 10/04/2017    Influenza TIV (IM) 09/01/2016    Influenza, high dose seasonal 0 5 mL 09/19/2018, 10/24/2019    Pneumococcal Conjugate 13-Valent 11/22/2017    TD (adult) Preservative Free 03/08/2015 Health Maintenance:         Topic Date Due    MAMMOGRAM  04/15/2020    Cervical Cancer Screening  11/23/2023    Hepatitis C Screening  Addressed         Topic Date Due    Pneumococcal Vaccine: 65+ Years (2 of 2 - PPSV23) 11/22/2018      Medicare Health Risk Assessment:     /60   Pulse (!) 52   Temp 98 °F (36 7 °C) (Temporal)   Resp 14   Ht 5' 2" (1 575 m)   Wt 70 3 kg (155 lb)   SpO2 96%   BMI 28 35 kg/m²      Gadiel Graham is here for her Initial Wellness visit  Health Risk Assessment:   Patient rates overall health as very good  Patient feels that their physical health rating is same  Eyesight was rated as same  Hearing was rated as same  Patient feels that their emotional and mental health rating is slightly better  Pain experienced in the last 7 days has been some  Patient's pain rating has been 6/10  Depression Screening:   PHQ-2 Score: 0      Fall Risk Screening: In the past year, patient has experienced: no history of falling in past year      Urinary Incontinence Screening:   Patient has leaked urine accidently in the last six months  Home Safety:  Patient does not have trouble with stairs inside or outside of their home  Patient has working smoke alarms and has working carbon monoxide detector  Home safety hazards include: not having non-slip bath and/or shower mats and poor household lighting  Nutrition:   Current diet is Regular and Limited junk food  Medications:   Patient is currently taking over-the-counter supplements  OTC medications include: see medication list  Patient is able to manage medications  Activities of Daily Living (ADLs)/Instrumental Activities of Daily Living (IADLs):   Walk and transfer into and out of bed and chair?: Yes  Dress and groom yourself?: Yes    Bathe or shower yourself?: Yes    Feed yourself?  Yes  Do your laundry/housekeeping?: Yes  Manage your money, pay your bills and track your expenses?: Yes  Make your own meals?: Yes    Do your own shopping?: Yes    Advance Care Planning:   Living will: Yes    Durable POA for healthcare:  Yes    Advanced directive: Yes    Five wishes given: Yes      PREVENTIVE SCREENINGS      Cardiovascular Screening:    General: Screening Not Indicated and History Lipid Disorder      Diabetes Screening:     General: Screening Current      Colorectal Cancer Screening:     General: Screening Current      Breast Cancer Screening:     General: Screening Current      Cervical Cancer Screening:    General: Screening Not Indicated      Hepatitis C Screening:    General: Screening Current      Santina Harada, MD

## 2020-01-07 NOTE — PROGRESS NOTES
Assessment/Plan:    SVT (supraventricular tachycardia) (HCC)  STABLE  FOLLOWED BY CARDIOLOGY    Arthritis  STABLE  DENIES ANY JOINT SWELLING OR REDNESS  JOINT STIFFNESS PRESENT  PAIN MANAGEMENT ADEQUATE    - CONTINUE CURRENT MANAGEMENT  - MEDICATION AS DIRECTED  - CALL / RETURN IF SYMPTOMS WORSEN      Stenosis of cervical spine with myelopathy (HCC)  STABLE  FOLLOWED BY ORTHO    Gastroesophageal reflux disease without esophagitis  STABLE  DENIES ANY CP, INDIGESTION, OR COUGH  NOTES NO MELENA OR HEMATOCHEZIA  NO HEMATEMESIS  COMPLIANT WITH MEDICATION  NO CONCERNS    - CONTINUE CURRENT TREATMENT PLAN  - MEDICATION AS PRESCRIBED  - AVOID CAFFEINE, ALCOHOL OR SMOKING         Diagnoses and all orders for this visit:    Essential hypertension  -     POCT urine dip auto non-scope  -     losartan (COZAAR) 100 MG tablet; Take 1 tablet (100 mg total) by mouth daily  -     hydrochlorothiazide (HYDRODIURIL) 12 5 mg tablet; Take 1 tablet (12 5 mg total) by mouth daily    Arthritis    Gastroesophageal reflux disease without esophagitis    Stenosis of cervical spine with myelopathy (HCC)    Screening for hypothyroidism    Screening for hyperlipidemia    Encounter for screening mammogram for breast cancer  -     Mammo screening bilateral w cad;  Future    Medicare annual wellness visit, initial    SVT (supraventricular tachycardia) ([facility] [address] )          Patient Instructions     DISCUSSED HEALTH ISSUES  HEALTHY DIET AND EXERCISE  BW WILL BE OBTAINED  MAMMOGRAPHY   RECOMMEND CALCIUM 2908-2530 MG DAILY  VITAMIN D3  1000 IU DAILY  RV IN 1 YEAR FOR ANNUAL EXAM, SOONER IF NEEDED      Recent Results (from the past 672 hour(s))   Comprehensive metabolic panel    Collection Time: 12/30/19  4:03 PM   Result Value Ref Range    Glucose, Random 86 65 - 99 mg/dL    BUN 20 7 - 25 mg/dL    Creatinine 0 59 0 50 - 0 99 mg/dL    eGFR Non  95 > OR = 60 mL/min/1 73m2    eGFR  110 > OR = 60 mL/min/1 73m2    SL AMB BUN/CREATININE RATIO NOT APPLICABLE 6 - 22 (calc)    Sodium 140 135 - 146 mmol/L    Potassium 5 0 3 5 - 5 3 mmol/L    Chloride 105 98 - 110 mmol/L    CO2 31 20 - 32 mmol/L    SL AMB CALCIUM 9 6 8 6 - 10 4 mg/dL    Protein, Total 6 2 6 1 - 8 1 g/dL    Albumin 4 1 3 6 - 5 1 g/dL    Globulin 2 1 1 9 - 3 7 g/dL (calc)    Albumin/Globulin Ratio 2 0 1 0 - 2 5 (calc)    TOTAL BILIRUBIN 0 6 0 2 - 1 2 mg/dL    Alkaline Phosphatase 58 33 - 130 U/L    AST 20 10 - 35 U/L    ALT 15 6 - 29 U/L   TSH, 3rd generation    Collection Time: 12/30/19  4:03 PM   Result Value Ref Range    TSH 1 69 0 40 - 4 50 mIU/L   Lipid panel    Collection Time: 12/30/19  4:03 PM   Result Value Ref Range    Total Cholesterol 218 (H) <200 mg/dL     >50 mg/dL    Triglycerides 42 <150 mg/dL    LDL Direct 99 mg/dL (calc)    Chol HDLC Ratio 2 0 <5 0 (calc)    Non-HDL Cholesterol 111 <130 mg/dL (calc)       Medicare Preventive Visit Patient Instructions  Thank you for completing your Welcome to Medicare Visit or Medicare Annual Wellness Visit today  Your next wellness visit will be due in one year (1/7/2021)  The screening/preventive services that you may require over the next 5-10 years are detailed below  Some tests may not apply to you based off risk factors and/or age  Screening tests ordered at today's visit but not completed yet may show as past due  Also, please note that scanned in results may not display below  Preventive Screenings:  Service Recommendations Previous Testing/Comments   Colorectal Cancer Screening  * Colonoscopy    * Fecal Occult Blood Test (FOBT)/Fecal Immunochemical Test (FIT)  * Fecal DNA/Cologuard Test  * Flexible Sigmoidoscopy Age: 54-65 years old   Colonoscopy: every 10 years (may be performed more frequently if at higher risk)  OR  FOBT/FIT: every 1 year  OR  Cologuard: every 3 years  OR  Sigmoidoscopy: every 5 years  Screening may be recommended earlier than age 48 if at higher risk for colorectal cancer   Also, an individualized decision between you and your healthcare provider will decide whether screening between the ages of 74-80 would be appropriate  Colonoscopy: 04/23/2014  FOBT/FIT: Not on file  Cologuard: Not on file  Sigmoidoscopy: Not on file    Screening Current     Breast Cancer Screening Age: 36 years old  Frequency: every 1-2 years  Not required if history of left and right mastectomy Mammogram: 04/15/2019    Screening Current   Cervical Cancer Screening Between the ages of 21-29, pap smear recommended once every 3 years  Between the ages of 33-67, can perform pap smear with HPV co-testing every 5 years  Recommendations may differ for women with a history of total hysterectomy, cervical cancer, or abnormal pap smears in past  Pap Smear: 11/23/2018    Screening Not Indicated   Hepatitis C Screening Once for adults born between 1945 and 1965  More frequently in patients at high risk for Hepatitis C Hep C Antibody: Not on file    Screening Current   Diabetes Screening 1-2 times per year if you're at risk for diabetes or have pre-diabetes Fasting glucose: No results in last 5 years   A1C: 5 6 %    Screening Current   Cholesterol Screening Once every 5 years if you don't have a lipid disorder  May order more often based on risk factors  Lipid panel: 12/30/2019    Screening Not Indicated  History Lipid Disorder     Other Preventive Screenings Covered by Medicare:  1  Abdominal Aortic Aneurysm (AAA) Screening: covered once if your at risk  You're considered to be at risk if you have a family history of AAA  2  Lung Cancer Screening: covers low dose CT scan once per year if you meet all of the following conditions: (1) Age 50-69; (2) No signs or symptoms of lung cancer; (3) Current smoker or have quit smoking within the last 15 years; (4) You have a tobacco smoking history of at least 30 pack years (packs per day multiplied by number of years you smoked); (5) You get a written order from a healthcare provider    3  Glaucoma Screening: covered annually if you're considered high risk: (1) You have diabetes OR (2) Family history of glaucoma OR (3)  aged 48 and older OR (3)  American aged 72 and older  3  Osteoporosis Screening: covered every 2 years if you meet one of the following conditions: (1) You're estrogen deficient and at risk for osteoporosis based off medical history and other findings; (2) Have a vertebral abnormality; (3) On glucocorticoid therapy for more than 3 months; (4) Have primary hyperparathyroidism; (5) On osteoporosis medications and need to assess response to drug therapy  · Last bone density test (DXA Scan): Not on file  5  HIV Screening: covered annually if you're between the age of 12-76  Also covered annually if you are younger than 13 and older than 72 with risk factors for HIV infection  For pregnant patients, it is covered up to 3 times per pregnancy  Immunizations:  Immunization Recommendations   Influenza Vaccine Annual influenza vaccination during flu season is recommended for all persons aged >= 6 months who do not have contraindications   Pneumococcal Vaccine (Prevnar and Pneumovax)  * Prevnar = PCV13  * Pneumovax = PPSV23   Adults 25-60 years old: 1-3 doses may be recommended based on certain risk factors  Adults 72 years old: Prevnar (PCV13) vaccine recommended followed by Pneumovax (PPSV23) vaccine  If already received PPSV23 since turning 65, then PCV13 recommended at least one year after PPSV23 dose  Hepatitis B Vaccine 3 dose series if at intermediate or high risk (ex: diabetes, end stage renal disease, liver disease)   Tetanus (Td) Vaccine - COST NOT COVERED BY MEDICARE PART B Following completion of primary series, a booster dose should be given every 10 years to maintain immunity against tetanus  Td may also be given as tetanus wound prophylaxis  Tdap Vaccine - COST NOT COVERED BY MEDICARE PART B Recommended at least once for all adults   For pregnant patients, recommended with each pregnancy  Shingles Vaccine (Shingrix) - COST NOT COVERED BY MEDICARE PART B  2 shot series recommended in those aged 48 and above     Health Maintenance Due:      Topic Date Due    MAMMOGRAM  04/15/2020    Cervical Cancer Screening  11/23/2023    Hepatitis C Screening  Addressed     Immunizations Due:      Topic Date Due    Pneumococcal Vaccine: 65+ Years (2 of 2 - PPSV23) 11/22/2018     Advance Directives   What are advance directives? Advance directives are legal documents that state your wishes and plans for medical care  These plans are made ahead of time in case you lose your ability to make decisions for yourself  Advance directives can apply to any medical decision, such as the treatments you want, and if you want to donate organs  What are the types of advance directives? There are many types of advance directives, and each state has rules about how to use them  You may choose a combination of any of the following:  · Living will: This is a written record of the treatment you want  You can also choose which treatments you do not want, which to limit, and which to stop at a certain time  This includes surgery, medicine, IV fluid, and tube feedings  · Durable power of  for healthcare [facility]): This is a written record that states who you want to make healthcare choices for you when you are unable to make them for yourself  This person, called a proxy, is usually a family member or a friend  You may choose more than 1 proxy  · Do not resuscitate (DNR) order:  A DNR order is used in case your heart stops beating or you stop breathing  It is a request not to have certain forms of treatment, such as CPR  A DNR order may be included in other types of advance directives  · Medical directive: This covers the care that you want if you are in a coma, near death, or unable to make decisions for yourself  You can list the treatments you want for each condition   Treatment may include pain medicine, surgery, blood transfusions, dialysis, IV or tube feedings, and a ventilator (breathing machine)  · Values history: This document has questions about your views, beliefs, and how you feel and think about life  This information can help others choose the care that you would choose  Why are advance directives important? An advance directive helps you control your care  Although spoken wishes may be used, it is better to have your wishes written down  Spoken wishes can be misunderstood, or not followed  Treatments may be given even if you do not want them  An advance directive may make it easier for your family to make difficult choices about your care  Urinary Incontinence   Urinary incontinence (UI)  is when you lose control of your bladder  UI develops because your bladder cannot store or empty urine properly  The 3 most common types of UI are stress incontinence, urge incontinence, or both  Medicines:   · May be given to help strengthen your bladder control  Report any side effects of medication to your healthcare provider  Do pelvic muscle exercises often:  Your pelvic muscles help you stop urinating  Squeeze these muscles tight for 5 seconds, then relax for 5 seconds  Gradually work up to squeezing for 10 seconds  Do 3 sets of 15 repetitions a day, or as directed  This will help strengthen your pelvic muscles and improve bladder control  Train your bladder:  Go to the bathroom at set times, such as every 2 hours, even if you do not feel the urge to go  You can also try to hold your urine when you feel the urge to go  For example, hold your urine for 5 minutes when you feel the urge to go  As that becomes easier, hold your urine for 10 minutes  Self-care:   · Keep a UI record  Write down how often you leak urine and how much you leak  Make a note of what you were doing when you leaked urine  · Drink liquids as directed   You may need to limit the amount of liquid you drink to help control your urine leakage  Do not drink any liquid right before you go to bed  Limit or do not have drinks that contain caffeine or alcohol  · Prevent constipation  Eat a variety of high-fiber foods  Good examples are high-fiber cereals, beans, vegetables, and whole-grain breads  Walking is the best way to trigger your intestines to have a bowel movement  · Exercise regularly and maintain a healthy weight  Weight loss and exercise will decrease pressure on your bladder and help you control your leakage  · Use a catheter as directed  to help empty your bladder  A catheter is a tiny, plastic tube that is put into your bladder to drain your urine  · Go to behavior therapy as directed  Behavior therapy may be used to help you learn to control your urge to urinate  Weight Management   Why it is important to manage your weight:  Being overweight increases your risk of health conditions such as heart disease, high blood pressure, type 2 diabetes, and certain types of cancer  It can also increase your risk for osteoarthritis, sleep apnea, and other respiratory problems  Aim for a slow, steady weight loss  Even a small amount of weight loss can lower your risk of health problems  How to lose weight safely:  A safe and healthy way to lose weight is to eat fewer calories and get regular exercise  You can lose up about 1 pound a week by decreasing the number of calories you eat by 500 calories each day  Healthy meal plan for weight management:  A healthy meal plan includes a variety of foods, contains fewer calories, and helps you stay healthy  A healthy meal plan includes the following:  · Eat whole-grain foods more often  A healthy meal plan should contain fiber  Fiber is the part of grains, fruits, and vegetables that is not broken down by your body  Whole-grain foods are healthy and provide extra fiber in your diet   Some examples of whole-grain foods are whole-wheat breads and pastas, oatmeal, Daiva Loll rice, and bulgur  · Eat a variety of vegetables every day  Include dark, leafy greens such as spinach, kale, laura greens, and mustard greens  Eat yellow and orange vegetables such as carrots, sweet potatoes, and winter squash  · Eat a variety of fruits every day  Choose fresh or canned fruit (canned in its own juice or light syrup) instead of juice  Fruit juice has very little or no fiber  · Eat low-fat dairy foods  Drink fat-free (skim) milk or 1% milk  Eat fat-free yogurt and low-fat cottage cheese  Try low-fat cheeses such as mozzarella and other reduced-fat cheeses  · Choose meat and other protein foods that are low in fat  Choose beans or other legumes such as split peas or lentils  Choose fish, skinless poultry (chicken or turkey), or lean cuts of red meat (beef or pork)  Before you cook meat or poultry, cut off any visible fat  · Use less fat and oil  Try baking foods instead of frying them  Add less fat, such as margarine, sour cream, regular salad dressing and mayonnaise to foods  Eat fewer high-fat foods  Some examples of high-fat foods include french fries, doughnuts, ice cream, and cakes  · Eat fewer sweets  Limit foods and drinks that are high in sugar  This includes candy, cookies, regular soda, and sweetened drinks  Exercise:  Exercise at least 30 minutes per day on most days of the week  Some examples of exercise include walking, biking, dancing, and swimming  You can also fit in more physical activity by taking the stairs instead of the elevator or parking farther away from stores  Ask your healthcare provider about the best exercise plan for you  © Copyright [employer] 2018 Information is for End User's use only and may not be sold, redistributed or otherwise used for commercial purposes   All illustrations and images included in CareNotes® are the copyrighted property of [employer]  or Fernandez Broussard in about 6 months (around 7/7/2020) for BP CHECK     Subjective:      Patient ID: Kulwant Gonzalez is a 81 y o  female  Chief Complaint   Patient presents with   [facility] [facility] Medicare Wellness Visit       PATIENT RETURNS FOR ANNUAL WELLNESS EXAM AND ANNUAL EVALUATION OF PATIENT'S MEDICAL ISSUES    INDIVIDUAL MEDICAL ISSUES WITH THEIR CURRENT STATUS, ASSESSMENT AND PLANS ARE LISTED ABOVE          The following portions of the patient's history were reviewed and updated as appropriate: allergies, current medications, past family history, past medical history, past social history, past surgical history and problem list     Review of Systems   Constitutional: Negative for chills, fatigue and fever  HENT: Negative for congestion, ear discharge, ear pain, mouth sores, postnasal drip, sore throat and trouble swallowing  Eyes: Negative for pain, discharge and visual disturbance  Respiratory: Negative for cough, shortness of breath and wheezing  Cardiovascular: Negative for chest pain, palpitations and leg swelling  Gastrointestinal: Negative for abdominal distention, abdominal pain, blood in stool, diarrhea and nausea  Endocrine: Negative for polydipsia, polyphagia and polyuria  Genitourinary: Negative for dysuria, frequency, hematuria and urgency  Musculoskeletal: Negative for arthralgias, gait problem and joint swelling  Skin: Negative for pallor and rash  Neurological: Negative for dizziness, syncope, speech difficulty, weakness, light-headedness, numbness and headaches  Hematological: Negative for adenopathy  Psychiatric/Behavioral: Negative for behavioral problems, confusion and sleep disturbance  The patient is not nervous/anxious            Current Outpatient Medications   Medication Sig Dispense Refill    Cholecalciferol 2000 units CAPS Take 1 capsule by mouth      hydrochlorothiazide (HYDRODIURIL) 12 5 mg tablet Take 1 tablet (12 5 mg total) by mouth daily 90 tablet 1    losartan (COZAAR) 100 MG tablet Take 1 tablet (100 mg total) by mouth daily 90 tablet 1    Methylcobalamin (B12-ACTIVE PO) Take by mouth      Multiple Vitamins-Minerals (OCUVITE ADULT FORMULA PO) Take by mouth daily      pantoprazole (PROTONIX) 40 mg tablet Take 1 tablet by mouth Daily      PREMARIN vaginal cream        No current facility-administered medications for this visit  Objective:    /60   Pulse (!) 52   Temp 98 °F (36 7 °C) (Temporal)   Resp 14   Ht 5' 2" (1 575 m)   Wt 70 3 kg (155 lb)   SpO2 96%   BMI 28 35 kg/m²        Physical Exam   Constitutional: She is oriented to person, place, and time  She appears well-developed and well-nourished  HENT:   Head: Normocephalic and atraumatic  Right Ear: External ear normal    Left Ear: External ear normal    Nose: Nose normal    Mouth/Throat: Oropharynx is clear and moist    Eyes: Pupils are equal, round, and reactive to light  Conjunctivae and EOM are normal  Right eye exhibits no discharge  Left eye exhibits no discharge  Neck: Normal range of motion  Neck supple  No thyromegaly present  Cardiovascular: Normal rate, regular rhythm, normal heart sounds and intact distal pulses  No murmur heard  Pulmonary/Chest: Effort normal and breath sounds normal  She has no wheezes  She has no rales  No breast tenderness or discharge  Abdominal: Soft  Bowel sounds are normal  She exhibits no distension and no mass  There is no tenderness  There is no rebound and no guarding  Genitourinary: No breast tenderness or discharge  Musculoskeletal: Normal range of motion  She exhibits no edema, tenderness or deformity  Lymphadenopathy:     She has no cervical adenopathy  Neurological: She is alert and oriented to person, place, and time  She has normal reflexes  No cranial nerve deficit  She exhibits normal muscle tone  Coordination normal    Skin: Skin is warm and dry  No rash noted  No erythema  Psychiatric: She has a normal mood and affect   Her behavior is normal  Judgment and thought content normal  Vitals reviewed               Jv Silva MD

## 2020-01-07 NOTE — LETTER
TAINA MUÑOZ    Recent Results (from the past 672 hour(s))   Comprehensive metabolic panel    Collection Time: 12/30/19  4:03 PM   Result Value Ref Range    Glucose, Random 86 65 - 99 mg/dL    BUN 20 7 - 25 mg/dL    Creatinine 0 59 0 50 - 0 99 mg/dL    eGFR Non  95 > OR = 60 mL/min/1 73m2    eGFR  110 > OR = 60 mL/min/1 73m2    SL AMB BUN/CREATININE RATIO NOT APPLICABLE 6 - 22 (calc)    Sodium 140 135 - 146 mmol/L    Potassium 5 0 3 5 - 5 3 mmol/L    Chloride 105 98 - 110 mmol/L    CO2 31 20 - 32 mmol/L    SL AMB CALCIUM 9 6 8 6 - 10 4 mg/dL    Protein, Total 6 2 6 1 - 8 1 g/dL    Albumin 4 1 3 6 - 5 1 g/dL    Globulin 2 1 1 9 - 3 7 g/dL (calc)    Albumin/Globulin Ratio 2 0 1 0 - 2 5 (calc)    TOTAL BILIRUBIN 0 6 0 2 - 1 2 mg/dL    Alkaline Phosphatase 58 33 - 130 U/L    AST 20 10 - 35 U/L    ALT 15 6 - 29 U/L   TSH, 3rd generation    Collection Time: 12/30/19  4:03 PM   Result Value Ref Range    TSH 1 69 0 40 - 4 50 mIU/L   Lipid panel    Collection Time: 12/30/19  4:03 PM   Result Value Ref Range    Total Cholesterol 218 (H) <200 mg/dL     >50 mg/dL    Triglycerides 42 <150 mg/dL    LDL Direct 99 mg/dL (calc)    Chol HDLC Ratio 2 0 <5 0 (calc)    Non-HDL Cholesterol 111 <130 mg/dL (calc)

## 2020-01-07 NOTE — PATIENT INSTRUCTIONS
DISCUSSED HEALTH ISSUES  HEALTHY DIET AND EXERCISE  BW WILL BE OBTAINED  MAMMOGRAPHY   RECOMMEND CALCIUM 3755-7925 MG DAILY  VITAMIN D3  1000 IU DAILY  RV IN 1 YEAR FOR ANNUAL EXAM, SOONER IF NEEDED      Recent Results (from the past 672 hour(s))   Comprehensive metabolic panel    Collection Time: 12/30/19  4:03 PM   Result Value Ref Range    Glucose, Random 86 65 - 99 mg/dL    BUN 20 7 - 25 mg/dL    Creatinine 0 59 0 50 - 0 99 mg/dL    eGFR Non  95 > OR = 60 mL/min/1 73m2    eGFR  110 > OR = 60 mL/min/1 73m2    SL AMB BUN/CREATININE RATIO NOT APPLICABLE 6 - 22 (calc)    Sodium 140 135 - 146 mmol/L    Potassium 5 0 3 5 - 5 3 mmol/L    Chloride 105 98 - 110 mmol/L    CO2 31 20 - 32 mmol/L    SL AMB CALCIUM 9 6 8 6 - 10 4 mg/dL    Protein, Total 6 2 6 1 - 8 1 g/dL    Albumin 4 1 3 6 - 5 1 g/dL    Globulin 2 1 1 9 - 3 7 g/dL (calc)    Albumin/Globulin Ratio 2 0 1 0 - 2 5 (calc)    TOTAL BILIRUBIN 0 6 0 2 - 1 2 mg/dL    Alkaline Phosphatase 58 33 - 130 U/L    AST 20 10 - 35 U/L    ALT 15 6 - 29 U/L   TSH, 3rd generation    Collection Time: 12/30/19  4:03 PM   Result Value Ref Range    TSH 1 69 0 40 - 4 50 mIU/L   Lipid panel    Collection Time: 12/30/19  4:03 PM   Result Value Ref Range    Total Cholesterol 218 (H) <200 mg/dL     >50 mg/dL    Triglycerides 42 <150 mg/dL    LDL Direct 99 mg/dL (calc)    Chol HDLC Ratio 2 0 <5 0 (calc)    Non-HDL Cholesterol 111 <130 mg/dL (calc)       Medicare Preventive Visit Patient Instructions  Thank you for completing your Welcome to Medicare Visit or Medicare Annual Wellness Visit today  Your next wellness visit will be due in one year (1/7/2021)  The screening/preventive services that you may require over the next 5-10 years are detailed below  Some tests may not apply to you based off risk factors and/or age  Screening tests ordered at today's visit but not completed yet may show as past due   Also, please note that scanned in results may not display below  Preventive Screenings:  Service Recommendations Previous Testing/Comments   Colorectal Cancer Screening  * Colonoscopy    * Fecal Occult Blood Test (FOBT)/Fecal Immunochemical Test (FIT)  * Fecal DNA/Cologuard Test  * Flexible Sigmoidoscopy Age: 54-65 years old   Colonoscopy: every 10 years (may be performed more frequently if at higher risk)  OR  FOBT/FIT: every 1 year  OR  Cologuard: every 3 years  OR  Sigmoidoscopy: every 5 years  Screening may be recommended earlier than age 48 if at higher risk for colorectal cancer  Also, an individualized decision between you and your healthcare provider will decide whether screening between the ages of 74-80 would be appropriate  Colonoscopy: 04/23/2014  FOBT/FIT: Not on file  Cologuard: Not on file  Sigmoidoscopy: Not on file    Screening Current     Breast Cancer Screening Age: 36 years old  Frequency: every 1-2 years  Not required if history of left and right mastectomy Mammogram: 04/15/2019    Screening Current   Cervical Cancer Screening Between the ages of 21-29, pap smear recommended once every 3 years  Between the ages of 33-67, can perform pap smear with HPV co-testing every 5 years  Recommendations may differ for women with a history of total hysterectomy, cervical cancer, or abnormal pap smears in past  Pap Smear: 11/23/2018    Screening Not Indicated   Hepatitis C Screening Once for adults born between 1945 and 1965  More frequently in patients at high risk for Hepatitis C Hep C Antibody: Not on file    Screening Current   Diabetes Screening 1-2 times per year if you're at risk for diabetes or have pre-diabetes Fasting glucose: No results in last 5 years   A1C: 5 6 %    Screening Current   Cholesterol Screening Once every 5 years if you don't have a lipid disorder  May order more often based on risk factors   Lipid panel: 12/30/2019    Screening Not Indicated  History Lipid Disorder     Other Preventive Screenings Covered by Medicare:  1  Abdominal Aortic Aneurysm (AAA) Screening: covered once if your at risk  You're considered to be at risk if you have a family history of AAA  2  Lung Cancer Screening: covers low dose CT scan once per year if you meet all of the following conditions: (1) Age 50-69; (2) No signs or symptoms of lung cancer; (3) Current smoker or have quit smoking within the last 15 years; (4) You have a tobacco smoking history of at least 30 pack years (packs per day multiplied by number of years you smoked); (5) You get a written order from a healthcare provider  3  Glaucoma Screening: covered annually if you're considered high risk: (1) You have diabetes OR (2) Family history of glaucoma OR (3)  aged 48 and older OR (3)  American aged 72 and older  3  Osteoporosis Screening: covered every 2 years if you meet one of the following conditions: (1) You're estrogen deficient and at risk for osteoporosis based off medical history and other findings; (2) Have a vertebral abnormality; (3) On glucocorticoid therapy for more than 3 months; (4) Have primary hyperparathyroidism; (5) On osteoporosis medications and need to assess response to drug therapy  · Last bone density test (DXA Scan): Not on file  5  HIV Screening: covered annually if you're between the age of 12-76  Also covered annually if you are younger than 13 and older than 72 with risk factors for HIV infection  For pregnant patients, it is covered up to 3 times per pregnancy  Immunizations:  Immunization Recommendations   Influenza Vaccine Annual influenza vaccination during flu season is recommended for all persons aged >= 6 months who do not have contraindications   Pneumococcal Vaccine (Prevnar and Pneumovax)  * Prevnar = PCV13  * Pneumovax = PPSV23   Adults 25-60 years old: 1-3 doses may be recommended based on certain risk factors  Adults 72 years old: Prevnar (PCV13) vaccine recommended followed by Pneumovax (PPSV23) vaccine   If already received PPSV23 since turning 65, then PCV13 recommended at least one year after PPSV23 dose  Hepatitis B Vaccine 3 dose series if at intermediate or high risk (ex: diabetes, end stage renal disease, liver disease)   Tetanus (Td) Vaccine - COST NOT COVERED BY MEDICARE PART B Following completion of primary series, a booster dose should be given every 10 years to maintain immunity against tetanus  Td may also be given as tetanus wound prophylaxis  Tdap Vaccine - COST NOT COVERED BY MEDICARE PART B Recommended at least once for all adults  For pregnant patients, recommended with each pregnancy  Shingles Vaccine (Shingrix) - COST NOT COVERED BY MEDICARE PART B  2 shot series recommended in those aged 48 and above     Health Maintenance Due:      Topic Date Due    MAMMOGRAM  04/15/2020    Cervical Cancer Screening  11/23/2023    Hepatitis C Screening  Addressed     Immunizations Due:      Topic Date Due    Pneumococcal Vaccine: 65+ Years (2 of 2 - PPSV23) 11/22/2018     Advance Directives   What are advance directives? Advance directives are legal documents that state your wishes and plans for medical care  These plans are made ahead of time in case you lose your ability to make decisions for yourself  Advance directives can apply to any medical decision, such as the treatments you want, and if you want to donate organs  What are the types of advance directives? There are many types of advance directives, and each state has rules about how to use them  You may choose a combination of any of the following:  · Living will: This is a written record of the treatment you want  You can also choose which treatments you do not want, which to limit, and which to stop at a certain time  This includes surgery, medicine, IV fluid, and tube feedings  · Durable power of  for healthcare [facility]):   This is a written record that states who you want to make healthcare choices for you when you are unable to make them for yourself  This person, called a proxy, is usually a family member or a friend  You may choose more than 1 proxy  · Do not resuscitate (DNR) order:  A DNR order is used in case your heart stops beating or you stop breathing  It is a request not to have certain forms of treatment, such as CPR  A DNR order may be included in other types of advance directives  · Medical directive: This covers the care that you want if you are in a coma, near death, or unable to make decisions for yourself  You can list the treatments you want for each condition  Treatment may include pain medicine, surgery, blood transfusions, dialysis, IV or tube feedings, and a ventilator (breathing machine)  · Values history: This document has questions about your views, beliefs, and how you feel and think about life  This information can help others choose the care that you would choose  Why are advance directives important? An advance directive helps you control your care  Although spoken wishes may be used, it is better to have your wishes written down  Spoken wishes can be misunderstood, or not followed  Treatments may be given even if you do not want them  An advance directive may make it easier for your family to make difficult choices about your care  Urinary Incontinence   Urinary incontinence (UI)  is when you lose control of your bladder  UI develops because your bladder cannot store or empty urine properly  The 3 most common types of UI are stress incontinence, urge incontinence, or both  Medicines:   · May be given to help strengthen your bladder control  Report any side effects of medication to your healthcare provider  Do pelvic muscle exercises often:  Your pelvic muscles help you stop urinating  Squeeze these muscles tight for 5 seconds, then relax for 5 seconds  Gradually work up to squeezing for 10 seconds  Do 3 sets of 15 repetitions a day, or as directed   This will help strengthen your pelvic muscles and improve bladder control  Train your bladder:  Go to the bathroom at set times, such as every 2 hours, even if you do not feel the urge to go  You can also try to hold your urine when you feel the urge to go  For example, hold your urine for 5 minutes when you feel the urge to go  As that becomes easier, hold your urine for 10 minutes  Self-care:   · Keep a UI record  Write down how often you leak urine and how much you leak  Make a note of what you were doing when you leaked urine  · Drink liquids as directed  You may need to limit the amount of liquid you drink to help control your urine leakage  Do not drink any liquid right before you go to bed  Limit or do not have drinks that contain caffeine or alcohol  · Prevent constipation  Eat a variety of high-fiber foods  Good examples are high-fiber cereals, beans, vegetables, and whole-grain breads  Walking is the best way to trigger your intestines to have a bowel movement  · Exercise regularly and maintain a healthy weight  Weight loss and exercise will decrease pressure on your bladder and help you control your leakage  · Use a catheter as directed  to help empty your bladder  A catheter is a tiny, plastic tube that is put into your bladder to drain your urine  · Go to behavior therapy as directed  Behavior therapy may be used to help you learn to control your urge to urinate  Weight Management   Why it is important to manage your weight:  Being overweight increases your risk of health conditions such as heart disease, high blood pressure, type 2 diabetes, and certain types of cancer  It can also increase your risk for osteoarthritis, sleep apnea, and other respiratory problems  Aim for a slow, steady weight loss  Even a small amount of weight loss can lower your risk of health problems  How to lose weight safely:  A safe and healthy way to lose weight is to eat fewer calories and get regular exercise   You can lose up about 1 pound a week by decreasing the number of calories you eat by 500 calories each day  Healthy meal plan for weight management:  A healthy meal plan includes a variety of foods, contains fewer calories, and helps you stay healthy  A healthy meal plan includes the following:  · Eat whole-grain foods more often  A healthy meal plan should contain fiber  Fiber is the part of grains, fruits, and vegetables that is not broken down by your body  Whole-grain foods are healthy and provide extra fiber in your diet  Some examples of whole-grain foods are whole-wheat breads and pastas, oatmeal, brown rice, and bulgur  · Eat a variety of vegetables every day  Include dark, leafy greens such as spinach, kale, laura greens, and mustard greens  Eat yellow and orange vegetables such as carrots, sweet potatoes, and winter squash  · Eat a variety of fruits every day  Choose fresh or canned fruit (canned in its own juice or light syrup) instead of juice  Fruit juice has very little or no fiber  · Eat low-fat dairy foods  Drink fat-free (skim) milk or 1% milk  Eat fat-free yogurt and low-fat cottage cheese  Try low-fat cheeses such as mozzarella and other reduced-fat cheeses  · Choose meat and other protein foods that are low in fat  Choose beans or other legumes such as split peas or lentils  Choose fish, skinless poultry (chicken or turkey), or lean cuts of red meat (beef or pork)  Before you cook meat or poultry, cut off any visible fat  · Use less fat and oil  Try baking foods instead of frying them  Add less fat, such as margarine, sour cream, regular salad dressing and mayonnaise to foods  Eat fewer high-fat foods  Some examples of high-fat foods include french fries, doughnuts, ice cream, and cakes  · Eat fewer sweets  Limit foods and drinks that are high in sugar  This includes candy, cookies, regular soda, and sweetened drinks  Exercise:  Exercise at least 30 minutes per day on most days of the week   Some examples of exercise include walking, biking, dancing, and swimming  You can also fit in more physical activity by taking the stairs instead of the elevator or parking farther away from stores  Ask your healthcare provider about the best exercise plan for you  © Copyright [employer] 2018 Information is for End User's use only and may not be sold, redistributed or otherwise used for commercial purposes   All illustrations and images included in CareNotes® are the copyrighted property of A D A M , [employer]  or [address] [place] [address]

## 2020-04-17 ENCOUNTER — TELEPHONE (OUTPATIENT)
Dept: FAMILY MEDICINE CLINIC | Facility: CLINIC | Age: 70
End: 2020-04-17

## 2020-04-18 DIAGNOSIS — I10 ESSENTIAL HYPERTENSION: Primary | ICD-10-CM

## 2020-04-18 RX ORDER — VALSARTAN 160 MG/1
160 TABLET ORAL DAILY
Qty: 90 TABLET | Refills: 1 | Status: SHIPPED | OUTPATIENT
Start: 2020-04-18 | End: 2020-07-07

## 2020-07-07 ENCOUNTER — OFFICE VISIT (OUTPATIENT)
Dept: FAMILY MEDICINE CLINIC | Facility: CLINIC | Age: 70
End: 2020-07-07
Payer: COMMERCIAL

## 2020-07-07 VITALS
RESPIRATION RATE: 16 BRPM | HEIGHT: 63 IN | HEART RATE: 53 BPM | WEIGHT: 156 LBS | TEMPERATURE: 97.8 F | BODY MASS INDEX: 27.64 KG/M2 | SYSTOLIC BLOOD PRESSURE: 134 MMHG | DIASTOLIC BLOOD PRESSURE: 72 MMHG | OXYGEN SATURATION: 97 %

## 2020-07-07 DIAGNOSIS — K21.9 GASTROESOPHAGEAL REFLUX DISEASE WITHOUT ESOPHAGITIS: ICD-10-CM

## 2020-07-07 DIAGNOSIS — I10 ESSENTIAL HYPERTENSION: Primary | ICD-10-CM

## 2020-07-07 DIAGNOSIS — I47.1 SVT (SUPRAVENTRICULAR TACHYCARDIA) (HCC): ICD-10-CM

## 2020-07-07 PROCEDURE — 3075F SYST BP GE 130 - 139MM HG: CPT | Performed by: FAMILY MEDICINE

## 2020-07-07 PROCEDURE — 1036F TOBACCO NON-USER: CPT | Performed by: FAMILY MEDICINE

## 2020-07-07 PROCEDURE — 3078F DIAST BP <80 MM HG: CPT | Performed by: FAMILY MEDICINE

## 2020-07-07 PROCEDURE — 3008F BODY MASS INDEX DOCD: CPT | Performed by: FAMILY MEDICINE

## 2020-07-07 PROCEDURE — 1160F RVW MEDS BY RX/DR IN RCRD: CPT | Performed by: FAMILY MEDICINE

## 2020-07-07 PROCEDURE — 4040F PNEUMOC VAC/ADMIN/RCVD: CPT | Performed by: FAMILY MEDICINE

## 2020-07-07 PROCEDURE — 99214 OFFICE O/P EST MOD 30 MIN: CPT | Performed by: FAMILY MEDICINE

## 2020-07-07 RX ORDER — LOSARTAN POTASSIUM 100 MG/1
100 TABLET ORAL DAILY
Qty: 90 TABLET | Refills: 1 | Status: SHIPPED | OUTPATIENT
Start: 2020-07-07 | End: 2021-01-08 | Stop reason: SDUPTHER

## 2020-07-07 RX ORDER — HYDROCHLOROTHIAZIDE 12.5 MG/1
12.5 TABLET ORAL DAILY
Qty: 90 TABLET | Refills: 1 | Status: SHIPPED | OUTPATIENT
Start: 2020-07-07 | End: 2021-01-08 | Stop reason: SDUPTHER

## 2020-07-07 NOTE — PROGRESS NOTES
Assessment/Plan:    Gastroesophageal reflux disease without esophagitis  STABLE  DENIES ANY CP, INDIGESTION, OR COUGH  NOTES NO MELENA OR HEMATOCHEZIA  NO HEMATEMESIS  COMPLIANT WITH MEDICATION  NO CONCERNS    - CONTINUE CURRENT TREATMENT PLAN  - MEDICATION AS PRESCRIBED  - AVOID CAFFEINE, ALCOHOL OR SMOKING      Hypertension  STABLE  DENIES ANY CP, SOB, PALPITATIONS, OR HEADACHE  NOTES NO WATER RETENTION  COMPLIANT WITH MEDICATION  NO CONCERNS    - CONTINUE CURRENT TREATMENT PLAN  - MONITOR DIETARY SODIUM INTAKE  - ENCOURAGE PHYSICAL ACTIVITY  - RV 6 MONTHS      SVT (supraventricular tachycardia) ([facility])  STABLE       Diagnoses and all orders for this visit:    Essential hypertension  -     losartan (COZAAR) 100 MG tablet; Take 1 tablet (100 mg total) by mouth daily  -     hydrochlorothiazide (HYDRODIURIL) 12 5 mg tablet; Take 1 tablet (12 5 mg total) by mouth daily    Gastroesophageal reflux disease without esophagitis    SVT (supraventricular tachycardia) (HCC)          Patient Instructions   CONTINUE CURRENT TREATMENT PLAN  MONITOR DIETARY SODIUM, CHOL, AND CARBOHYDRATE INTAKE  ENCOURAGE PHYSICAL ACTIVITY      RV 6 M, SOONER PRN        Return in about 6 months (around 1/7/2021) for Annual physical, Recheck  Subjective:      Patient ID: Lucille Miller is a 81 y o  female      Chief Complaint   Patient presents with    Blood Pressure Check    Medication Management     discuss going back on [facility] - she thinks she felt better    Medication Refill     Hydrochloathiaze    Immunizations     PPV 23 due       PATIENT RETURNS FOR ROUTINE EVALUATION OF PATIENT'S MEDICAL ISSUES    INDIVIDUAL MEDICAL ISSUES WITH THEIR CURRENT STATUS, ASSESSMENT AND PLANS ARE LISTED ABOVE          The following portions of the patient's history were reviewed and updated as appropriate: allergies, current medications, past family history, past medical history, past social history, past surgical history and problem list     Review of Systems   Constitutional: Negative for chills, fatigue and fever  HENT: Negative for congestion, ear discharge, ear pain, mouth sores, postnasal drip, sore throat and trouble swallowing  Eyes: Negative for pain, discharge and visual disturbance  Respiratory: Negative for cough, shortness of breath and wheezing  Cardiovascular: Negative for chest pain, palpitations and leg swelling  Gastrointestinal: Negative for abdominal distention, abdominal pain, blood in stool, diarrhea and nausea  Endocrine: Negative for polydipsia, polyphagia and polyuria  Genitourinary: Negative for dysuria, frequency, hematuria and urgency  Musculoskeletal: Positive for arthralgias and neck pain  Negative for gait problem and joint swelling  Skin: Negative for pallor and rash  Neurological: Negative for dizziness, syncope, speech difficulty, weakness, light-headedness, numbness and headaches  Hematological: Negative for adenopathy  Psychiatric/Behavioral: Negative for behavioral problems, confusion and sleep disturbance  The patient is not nervous/anxious  Current Outpatient Medications   Medication Sig Dispense Refill    Cholecalciferol 2000 units CAPS Take 1 capsule by mouth      hydrochlorothiazide (HYDRODIURIL) 12 5 mg tablet Take 1 tablet (12 5 mg total) by mouth daily 90 tablet 1    Methylcobalamin (B12-ACTIVE PO) Take by mouth      Multiple Vitamins-Minerals (OCUVITE ADULT FORMULA PO) Take by mouth daily      pantoprazole (PROTONIX) 40 mg tablet Take 1 tablet by mouth Daily      PREMARIN vaginal cream       losartan (COZAAR) 100 MG tablet Take 1 tablet (100 mg total) by mouth daily 90 tablet 1     No current facility-administered medications for this visit          Objective:    /72   Pulse (!) 53   Temp 97 8 °F (36 6 °C) (Temporal)   Resp 16   Ht 5' 2 5" (1 588 m)   Wt 70 8 kg (156 lb)   SpO2 97%   BMI 28 08 kg/m²        Physical Exam   Constitutional: She is oriented to person, place, and time  She appears well-developed and well-nourished  HENT:   Head: Normocephalic and atraumatic  Eyes: Pupils are equal, round, and reactive to light  Conjunctivae and EOM are normal  Right eye exhibits no discharge  Left eye exhibits no discharge  Neck: Normal range of motion  Neck supple  No thyromegaly present  Cardiovascular: Normal rate, regular rhythm and normal heart sounds  No murmur heard  Pulmonary/Chest: Effort normal and breath sounds normal  No respiratory distress  She has no wheezes  She has no rales  Abdominal: Soft  Bowel sounds are normal  There is no tenderness  Musculoskeletal: Normal range of motion  She exhibits no edema or tenderness  Lymphadenopathy:     She has no cervical adenopathy  Neurological: She is alert and oriented to person, place, and time  Skin: Skin is warm and dry  No rash noted  No erythema  Psychiatric: She has a normal mood and affect   Her behavior is normal  Judgment and thought content normal               Nichole Shea MD

## 2020-07-11 ENCOUNTER — HOSPITAL ENCOUNTER (EMERGENCY)
Facility: HOSPITAL | Age: 70
Discharge: HOME | End: 2020-07-11
Attending: EMERGENCY MEDICINE | Admitting: EMERGENCY MEDICINE
Payer: COMMERCIAL

## 2020-07-11 VITALS
BODY MASS INDEX: 27.9 KG/M2 | HEART RATE: 71 BPM | RESPIRATION RATE: 20 BRPM | SYSTOLIC BLOOD PRESSURE: 160 MMHG | OXYGEN SATURATION: 97 % | DIASTOLIC BLOOD PRESSURE: 75 MMHG | WEIGHT: 155 LBS | TEMPERATURE: 97.1 F

## 2020-07-11 DIAGNOSIS — L50.9 URTICARIA: Primary | ICD-10-CM

## 2020-07-11 PROCEDURE — 99283 EMERGENCY DEPT VISIT LOW MDM: CPT

## 2020-07-11 PROCEDURE — 99284 EMERGENCY DEPT VISIT MOD MDM: CPT | Performed by: EMERGENCY MEDICINE

## 2020-07-11 RX ORDER — PREDNISONE 20 MG/1
60 TABLET ORAL ONCE
Status: COMPLETED | OUTPATIENT
Start: 2020-07-11 | End: 2020-07-11

## 2020-07-11 RX ORDER — FAMOTIDINE 20 MG/1
20 TABLET, FILM COATED ORAL ONCE
Status: COMPLETED | OUTPATIENT
Start: 2020-07-11 | End: 2020-07-11

## 2020-07-11 RX ORDER — PREDNISONE 20 MG/1
20 TABLET ORAL DAILY
Qty: 6 TABLET | Refills: 0 | Status: SHIPPED | OUTPATIENT
Start: 2020-07-12 | End: 2020-09-28 | Stop reason: ALTCHOICE

## 2020-07-11 RX ORDER — EPINEPHRINE 0.3 MG/.3ML
0.3 INJECTION SUBCUTANEOUS ONCE
Qty: 0.6 ML | Refills: 0 | Status: SHIPPED | OUTPATIENT
Start: 2020-07-11 | End: 2021-08-17 | Stop reason: SDUPTHER

## 2020-07-11 RX ADMIN — PREDNISONE 60 MG: 20 TABLET ORAL at 12:22

## 2020-07-11 RX ADMIN — FAMOTIDINE 20 MG: 20 TABLET ORAL at 12:22

## 2020-07-11 NOTE — ED PROVIDER NOTES
History  Chief Complaint   Patient presents with    Rash     states had allergic reaction twice in two days started with itchy palms both times  progressed to rash, took two benadryl about 11 am today developed hives and lips feel funny     Pt in the ER with a possible allergic reaction  Pt ate a "[employer]" for breakfast today, approx 30mins later, she developed pruritus to her palms and broke out in hives to her upper extremities and abdomen  + tingling to lips, she denies soreness in her throat, or swelling  Pt took 50mg of benadryl at 11a  Pt with a similar reaction 2 nights ago, also 30mins after eating the same bagel for the first time, with resolution of symptoms after taking Benadryl  She denies sick contacts or previous food allergies  History provided by:  Patient   used: No    Rash   Location:  Shoulder/arm and torso  Shoulder/arm rash location:  L upper arm, R upper arm, L axilla and R axilla  Torso rash location:  Abd LUQ, abd LLQ, abd RLQ and abd RUQ  Severity:  Moderate  Onset quality:  Sudden  Timing:  Constant  Progression:  Worsening  Chronicity:  Recurrent  Context: food    Relieved by: Antihistamines  Associated symptoms: no abdominal pain, no diarrhea, no fever, no nausea, no shortness of breath and not vomiting        Prior to Admission Medications   Prescriptions Last Dose Informant Patient Reported? Taking?    Cholecalciferol 2000 units CAPS  Self Yes No   Sig: Take 1 capsule by mouth   Methylcobalamin (B12-ACTIVE PO)  Self Yes No   Sig: Take by mouth   Multiple Vitamins-Minerals (OCUVITE ADULT FORMULA PO)  Self Yes No   Sig: Take by mouth daily   PREMARIN vaginal cream  Self Yes No   hydrochlorothiazide (HYDRODIURIL) 12 5 mg tablet   No No   Sig: Take 1 tablet (12 5 mg total) by mouth daily   losartan (COZAAR) 100 MG tablet   No No   Sig: Take 1 tablet (100 mg total) by mouth daily   pantoprazole (PROTONIX) 40 mg tablet  Self Yes No   Sig: Take 1 tablet by mouth Daily      Facility-Administered Medications: None       Past Medical History:   Diagnosis Date    Arthritis     Enthesopathy of lower extremity     last assessed 08/21/2015    GERD (gastroesophageal reflux disease)     High blood pressure     History of basal cell cancer     Ocular migraine     SVT (supraventricular tachycardia) (HCC)        Past Surgical History:   Procedure Laterality Date    CERVICAL DISCECTOMY  2018    [address]  [address]    MOHS SURGERY  2017    Basal Cell, Face-Foster       Family History   Problem Relation Age of Onset    Arthritis Mother     Cancer Mother     Esophageal cancer Mother     Lung cancer Father     Cancer Father     Hypertension Father     Stroke Maternal Grandmother     Heart disease Maternal Grandfather     Substance Abuse Neg Hx     Mental illness Neg Hx      I have reviewed and agree with the history as documented  E-Cigarette/Vaping    E-Cigarette Use Never User      E-Cigarette/Vaping Substances    Nicotine No     THC No     CBD No     Flavoring No     Other No     Unknown No      Social History     Tobacco Use    Smoking status: Never Smoker    Smokeless tobacco: Never Used   Substance Use Topics    Alcohol use: Yes     Frequency: 2-3 times a week     Drinks per session: 1 or 2     Comment: occ    Drug use: No       Review of Systems   Constitutional: Negative for chills and fever  Respiratory: Negative for cough, chest tightness and shortness of breath  Gastrointestinal: Negative for abdominal pain, diarrhea, nausea and vomiting  Genitourinary: Negative for dysuria, frequency, hematuria and urgency  Musculoskeletal: Negative for back pain, neck pain and neck stiffness  Skin: Positive for rash  All other systems reviewed and are negative  Physical Exam  Physical Exam   Constitutional: She is oriented to person, place, and time   She appears well-developed and well-nourished  No distress  HENT:   Head: Normocephalic and atraumatic  Mouth/Throat: Oropharynx is clear and moist and mucous membranes are normal  No posterior oropharyngeal edema or posterior oropharyngeal erythema  Eyes: Pupils are equal, round, and reactive to light  Conjunctivae are normal    Neck: Normal range of motion  Neck supple  Cardiovascular: Normal rate, regular rhythm and normal heart sounds  No murmur heard  Pulmonary/Chest: Effort normal and breath sounds normal  No respiratory distress  Abdominal: Soft  Bowel sounds are normal  She exhibits no distension  There is no tenderness  Musculoskeletal: Normal range of motion  She exhibits no edema or deformity  Neurological: She is alert and oriented to person, place, and time  No cranial nerve deficit  Skin: Skin is warm and dry  Rash noted  She is not diaphoretic  No pallor  Urticarial rash noted to b/l upper extremities and abdomen   Psychiatric: She has a normal mood and affect  Her behavior is normal    Nursing note and vitals reviewed  Vital Signs  ED Triage Vitals [07/11/20 1151]   Temperature Pulse Respirations Blood Pressure SpO2   (!) 97 1 °F (36 2 °C) 71 20 160/75 97 %      Temp Source Heart Rate Source Patient Position - Orthostatic VS BP Location FiO2 (%)   Tympanic Monitor Sitting Right arm --      Pain Score       No Pain           Vitals:    07/11/20 1151   BP: 160/75   Pulse: 71   Patient Position - Orthostatic VS: Sitting         Visual Acuity      ED Medications  Medications   predniSONE tablet 60 mg (60 mg Oral Given 7/11/20 1222)   famotidine (PEPCID) tablet 20 mg (20 mg Oral Given 7/11/20 1222)       Diagnostic Studies  Results Reviewed     None                 No orders to display              Procedures  Procedures         ED Course       US AUDIT      Most Recent Value   Initial Alcohol Screen: US AUDIT-C    1  How often do you have a drink containing alcohol?   4 Filed at: 07/11/2020 1151   Audit-C Score  4 Filed at: 07/11/2020 1151                  [id number]/[id number]      Most Recent Value   How many times in the past year have you    Used an illegal drug or used a prescription medication for non-medical reasons? Never Filed at: 07/11/2020 1152                                [facility]  Number of Diagnoses or Management Options  Urticaria:   Diagnosis management comments: Pt in ER with hives likely secondary to food allergies  Pt treated and reevaluated, with significant improvement of hives  Will d/c to home with a short course of prednisone and an Epi-pen  Pt will f/u with allergist          Disposition  Final diagnoses:   Urticaria     Time reflects when diagnosis was documented in both MDM as applicable and the Disposition within this note     Time User Action Codes Description Comment    7/11/2020  1:55 PM Deepika Alatorre Add [L50 9] Urticaria       ED Disposition     ED Disposition Condition Date/Time Comment    Discharge Stable Sat Jul 11, 2020  1:55 PM [facility] discharge to home/self care              Follow-up Information     Follow up With Specialties Details Why Contact Info    Giuseppe Haro MD Family Medicine Schedule an appointment as soon as possible for a visit in 2 days for follow up [address] [address]  [phone]      [facility] Schedule an appointment as soon as possible for a visit  for follow up [address] [address]       [address] and Immunology Schedule an appointment as soon as possible for a visit  for follow up Dc [phone] [facility]  [phone]            Discharge Medication List as of 7/11/2020  1:58 PM      START taking these medications    Details   EPINEPHrine (EPIPEN) 0 3 mg/0 3 mL SOAJ Inject 0 3 mL (0 3 mg total) into a muscle once for 1 dose, Starting Sat 7/11/2020, Normal      predniSONE 20 mg tablet Take 1 tablet (20 mg total) by mouth daily, Starting Sun 7/12/2020, Normal         CONTINUE these medications which have NOT CHANGED    Details   Cholecalciferol 2000 units CAPS Take 1 capsule by mouth, Historical Med      hydrochlorothiazide (HYDRODIURIL) 12 5 mg tablet Take 1 tablet (12 5 mg total) by mouth daily, Starting Tue 7/7/2020, Until Mon 10/5/2020, Normal      losartan (COZAAR) 100 MG tablet Take 1 tablet (100 mg total) by mouth daily, Starting Tue 7/7/2020, Normal      Methylcobalamin (B12-ACTIVE PO) Take by mouth, Historical Med      Multiple Vitamins-Minerals (OCUVITE ADULT FORMULA PO) Take by mouth daily, Historical Med      pantoprazole (PROTONIX) 40 mg tablet Take 1 tablet by mouth Daily, Starting Thu 4/24/2014, Historical Med      PREMARIN vaginal cream Starting Fri 11/23/2018, Historical Med           No discharge procedures on file      PDMP Review     None          ED Provider  Electronically Signed by           Chu Chávez DO  07/11/20 7401

## 2020-07-11 NOTE — ED NOTES
Rash is gone from back, continues with faint hives under arms     Deborah Wolfe, MAGEN  07/11/20 7897

## 2020-07-13 ENCOUNTER — TELEPHONE (OUTPATIENT)
Dept: FAMILY MEDICINE CLINIC | Facility: CLINIC | Age: 70
End: 2020-07-13

## 2020-07-13 NOTE — TELEPHONE ENCOUNTER
Myesha Javier states she had an allergic reaction to something she ate  She thinks it was from a [employer] that she had 2 times  Both times she ate it, she had itchy palms, rash on arms,back, abd, stomach, hives and tingling lips  The first time wasn't as bad as the second  She took benadryl both times, but didn't work the second time  She went to the ER on Saturday after it happened the second time  They gave her prednisone and pepcid and sent her home with a rx for more prednisone and an epi pen  And reccommended her to see an allergist   Please advise if there is anything else she needs to do      [phone]

## 2020-09-17 ENCOUNTER — CLINICAL SUPPORT (OUTPATIENT)
Dept: FAMILY MEDICINE CLINIC | Facility: CLINIC | Age: 70
End: 2020-09-17
Payer: COMMERCIAL

## 2020-09-17 DIAGNOSIS — Z23 ENCOUNTER FOR IMMUNIZATION: ICD-10-CM

## 2020-09-17 PROCEDURE — G0008 ADMIN INFLUENZA VIRUS VAC: HCPCS

## 2020-09-17 PROCEDURE — 90662 IIV NO PRSV INCREASED AG IM: CPT

## 2020-09-28 ENCOUNTER — OFFICE VISIT (OUTPATIENT)
Dept: FAMILY MEDICINE CLINIC | Facility: CLINIC | Age: 70
End: 2020-09-28
Payer: COMMERCIAL

## 2020-09-28 VITALS
TEMPERATURE: 97.9 F | OXYGEN SATURATION: 95 % | HEART RATE: 62 BPM | RESPIRATION RATE: 16 BRPM | HEIGHT: 62 IN | SYSTOLIC BLOOD PRESSURE: 132 MMHG | WEIGHT: 152 LBS | DIASTOLIC BLOOD PRESSURE: 70 MMHG | BODY MASS INDEX: 27.97 KG/M2

## 2020-09-28 DIAGNOSIS — N30.01 ACUTE CYSTITIS WITH HEMATURIA: Primary | ICD-10-CM

## 2020-09-28 DIAGNOSIS — R39.9 UTI SYMPTOMS: ICD-10-CM

## 2020-09-28 DIAGNOSIS — N32.89 BLADDER SPASMS: ICD-10-CM

## 2020-09-28 DIAGNOSIS — S09.91XA INJURY OF RIGHT EAR, INITIAL ENCOUNTER: ICD-10-CM

## 2020-09-28 LAB
SL AMB  POCT GLUCOSE, UA: 0
SL AMB LEUKOCYTE ESTERASE,UA: 500
SL AMB POCT BILIRUBIN,UA: 0
SL AMB POCT BLOOD,UA: 250
SL AMB POCT CLARITY,UA: ABNORMAL
SL AMB POCT COLOR,UA: ABNORMAL
SL AMB POCT KETONES,UA: 0
SL AMB POCT NITRITE,UA: ABNORMAL
SL AMB POCT PH,UA: 5
SL AMB POCT SPECIFIC GRAVITY,UA: 1.01
SL AMB POCT URINE PROTEIN: 30
SL AMB POCT UROBILINOGEN: 0

## 2020-09-28 PROCEDURE — 99213 OFFICE O/P EST LOW 20 MIN: CPT | Performed by: NURSE PRACTITIONER

## 2020-09-28 PROCEDURE — 81003 URINALYSIS AUTO W/O SCOPE: CPT | Performed by: NURSE PRACTITIONER

## 2020-09-28 RX ORDER — NITROFURANTOIN 25; 75 MG/1; MG/1
100 CAPSULE ORAL 2 TIMES DAILY
Qty: 10 CAPSULE | Refills: 0 | Status: SHIPPED | OUTPATIENT
Start: 2020-09-28 | End: 2020-10-03

## 2020-09-28 RX ORDER — PHENAZOPYRIDINE HYDROCHLORIDE 100 MG/1
100 TABLET, FILM COATED ORAL 3 TIMES DAILY PRN
Qty: 10 TABLET | Refills: 0 | Status: SHIPPED | OUTPATIENT
Start: 2020-09-28 | End: 2020-10-02 | Stop reason: ALTCHOICE

## 2020-09-28 NOTE — PROGRESS NOTES
Assessment/Plan:    1  Acute cystitis with hematuria  -     nitrofurantoin (MACROBID) 100 mg capsule; Take 1 capsule (100 mg total) by mouth 2 (two) times a day for 5 days  -     phenazopyridine (PYRIDIUM) 100 mg tablet; Take 1 tablet (100 mg total) by mouth 3 (three) times a day as needed for bladder spasms  -     Urine culture  -     Cytology, Non-Gyn    2  UTI symptoms  -     POCT urine dip auto non-scope  -     nitrofurantoin (MACROBID) 100 mg capsule; Take 1 capsule (100 mg total) by mouth 2 (two) times a day for 5 days  -     Urine culture  -     Cytology, Non-Gyn    3  Bladder spasms  -     phenazopyridine (PYRIDIUM) 100 mg tablet; Take 1 tablet (100 mg total) by mouth 3 (three) times a day as needed for bladder spasms  -     Urine culture  -     Cytology, Non-Gyn    4  Injury of right ear, initial encounter  Comments:  Continue antibiotic ointment on the area  Keep area clean and dry  RTO recheck  Patient Instructions   Drink plenty of fluids      Return in about 5 days (around 10/3/2020) for Recheck  Subjective:      Patient ID: Brittney Bradley is a 78 y o  female  Chief Complaint   Patient presents with    uti symptoms    right ear red and irritated       Luis M Florentino is a 78year old female who reports to the office for evaluation of UTI symptoms  Additionally, reports that she has swelling and discomfort of right ear lobe  States she was outside last evening and felt as if a bug was on her ear and scratched the area  States she noted a good amount of blood and since that time, she has noticed swelling without itching or pain  Pt reports she has been putting bacitracin on the area  Urinary Tract Infection    This is a new problem  The current episode started in the past 7 days  The problem occurs every urination  The problem has been gradually worsening  The quality of the pain is described as burning and stabbing  The pain is moderate  There has been no fever   Associated symptoms include frequency, hematuria and urgency  Pertinent negatives include no chills, discharge, flank pain, hesitancy, nausea or vomiting  She has tried nothing for the symptoms  The following portions of the patient's history were reviewed and updated as appropriate: allergies, current medications, past family history, past medical history, past social history, past surgical history and problem list     Review of Systems   Constitutional: Negative for chills  Gastrointestinal: Negative for abdominal pain, constipation, diarrhea, nausea and vomiting  Genitourinary: Positive for dysuria, frequency, hematuria and urgency  Negative for flank pain, hesitancy, pelvic pain (pelvic pressure) and vaginal bleeding  Skin:        Right ear lobe swelling         Current Outpatient Medications   Medication Sig Dispense Refill    Cholecalciferol 2000 units CAPS Take 1 capsule by mouth      EPINEPHrine (EPIPEN) 0 3 mg/0 3 mL SOAJ Inject 0 3 mL (0 3 mg total) into a muscle once for 1 dose 0 6 mL 0    hydrochlorothiazide (HYDRODIURIL) 12 5 mg tablet Take 1 tablet (12 5 mg total) by mouth daily 90 tablet 1    losartan (COZAAR) 100 MG tablet Take 1 tablet (100 mg total) by mouth daily 90 tablet 1    Methylcobalamin (B12-ACTIVE PO) Take by mouth      Multiple Vitamins-Minerals (OCUVITE ADULT FORMULA PO) Take by mouth daily      pantoprazole (PROTONIX) 40 mg tablet Take 1 tablet by mouth Daily      PREMARIN vaginal cream       nitrofurantoin (MACROBID) 100 mg capsule Take 1 capsule (100 mg total) by mouth 2 (two) times a day for 5 days 10 capsule 0    phenazopyridine (PYRIDIUM) 100 mg tablet Take 1 tablet (100 mg total) by mouth 3 (three) times a day as needed for bladder spasms 10 tablet 0     No current facility-administered medications for this visit          Objective:    /70   Pulse 62   Temp 97 9 °F (36 6 °C) (Temporal)   Resp 16   Ht 5' 2" (1 575 m)   Wt 68 9 kg (152 lb)   SpO2 95%   BMI 27 80 kg/m² Physical Exam  Constitutional:       General: She is not in acute distress  Appearance: She is well-developed  She is not diaphoretic  HENT:      Head: Normocephalic and atraumatic  Ears:     Eyes:      General:         Right eye: No discharge  Left eye: No discharge  Conjunctiva/sclera: Conjunctivae normal    Neck:      Musculoskeletal: Normal range of motion and neck supple  Thyroid: No thyromegaly  Cardiovascular:      Rate and Rhythm: Normal rate and regular rhythm  Heart sounds: Normal heart sounds  Pulmonary:      Effort: Pulmonary effort is normal  No respiratory distress  Breath sounds: Normal breath sounds  No decreased breath sounds, wheezing, rhonchi or rales  Abdominal:      General: Abdomen is flat  Bowel sounds are normal       Palpations: Abdomen is soft  There is no hepatomegaly or splenomegaly  Tenderness: There is no abdominal tenderness  Lymphadenopathy:      Cervical: No cervical adenopathy  Skin:     General: Skin is warm and dry  Findings: No rash  Neurological:      Mental Status: She is alert and oriented to person, place, and time  Psychiatric:         Behavior: Behavior normal          Thought Content:  Thought content normal          Judgment: Judgment normal                 KADI Moon

## 2020-09-30 LAB
CLINICAL INFO: NORMAL
PATH REPORT.COMMENTS IMP SPEC: NORMAL
PATH REPORT.FINAL DX SPEC: NORMAL
QUESTION/PROBLEM: NORMAL
SL AMB CONTAINER TYPE: NORMAL
SL AMB FINAL RESOLUTION: NORMAL
SL AMB REPORT STATUS: NORMAL
SPECIMEN SOURCE: NORMAL

## 2020-10-02 ENCOUNTER — OFFICE VISIT (OUTPATIENT)
Dept: FAMILY MEDICINE CLINIC | Facility: CLINIC | Age: 70
End: 2020-10-02
Payer: COMMERCIAL

## 2020-10-02 VITALS
HEART RATE: 56 BPM | BODY MASS INDEX: 28.16 KG/M2 | DIASTOLIC BLOOD PRESSURE: 80 MMHG | TEMPERATURE: 97.8 F | WEIGHT: 153 LBS | HEIGHT: 62 IN | RESPIRATION RATE: 16 BRPM | OXYGEN SATURATION: 97 % | SYSTOLIC BLOOD PRESSURE: 138 MMHG

## 2020-10-02 DIAGNOSIS — Z23 NEED FOR PNEUMOCOCCAL VACCINATION: ICD-10-CM

## 2020-10-02 DIAGNOSIS — T14.8XXA ABRASION: ICD-10-CM

## 2020-10-02 DIAGNOSIS — N30.01 ACUTE CYSTITIS WITH HEMATURIA: Primary | ICD-10-CM

## 2020-10-02 DIAGNOSIS — N32.89 BLADDER SPASMS: ICD-10-CM

## 2020-10-02 LAB
SL AMB  POCT GLUCOSE, UA: 0
SL AMB LEUKOCYTE ESTERASE,UA: 0
SL AMB POCT BILIRUBIN,UA: 0
SL AMB POCT BLOOD,UA: 0
SL AMB POCT CLARITY,UA: CLEAR
SL AMB POCT COLOR,UA: YELLOW
SL AMB POCT KETONES,UA: 0
SL AMB POCT NITRITE,UA: 0
SL AMB POCT PH,UA: 7
SL AMB POCT SPECIFIC GRAVITY,UA: 1.01
SL AMB POCT URINE PROTEIN: 0
SL AMB POCT UROBILINOGEN: 0

## 2020-10-02 PROCEDURE — G0009 ADMIN PNEUMOCOCCAL VACCINE: HCPCS

## 2020-10-02 PROCEDURE — 81003 URINALYSIS AUTO W/O SCOPE: CPT | Performed by: NURSE PRACTITIONER

## 2020-10-02 PROCEDURE — 99213 OFFICE O/P EST LOW 20 MIN: CPT | Performed by: NURSE PRACTITIONER

## 2020-10-02 PROCEDURE — 90732 PPSV23 VACC 2 YRS+ SUBQ/IM: CPT

## 2020-10-21 ENCOUNTER — OFFICE VISIT (OUTPATIENT)
Dept: FAMILY MEDICINE CLINIC | Facility: CLINIC | Age: 70
End: 2020-10-21
Payer: COMMERCIAL

## 2020-10-21 VITALS
RESPIRATION RATE: 18 BRPM | WEIGHT: 152 LBS | TEMPERATURE: 98.2 F | HEART RATE: 74 BPM | SYSTOLIC BLOOD PRESSURE: 140 MMHG | OXYGEN SATURATION: 97 % | DIASTOLIC BLOOD PRESSURE: 90 MMHG | HEIGHT: 63 IN | BODY MASS INDEX: 26.93 KG/M2

## 2020-10-21 DIAGNOSIS — I47.1 SVT (SUPRAVENTRICULAR TACHYCARDIA) (HCC): ICD-10-CM

## 2020-10-21 DIAGNOSIS — I10 ESSENTIAL HYPERTENSION: ICD-10-CM

## 2020-10-21 DIAGNOSIS — R42 DIZZINESS: Primary | ICD-10-CM

## 2020-10-21 PROCEDURE — 3080F DIAST BP >= 90 MM HG: CPT | Performed by: FAMILY MEDICINE

## 2020-10-21 PROCEDURE — 4040F PNEUMOC VAC/ADMIN/RCVD: CPT | Performed by: FAMILY MEDICINE

## 2020-10-21 PROCEDURE — 3077F SYST BP >= 140 MM HG: CPT | Performed by: FAMILY MEDICINE

## 2020-10-21 PROCEDURE — 1036F TOBACCO NON-USER: CPT | Performed by: FAMILY MEDICINE

## 2020-10-21 PROCEDURE — 36415 COLL VENOUS BLD VENIPUNCTURE: CPT | Performed by: FAMILY MEDICINE

## 2020-10-21 PROCEDURE — 99214 OFFICE O/P EST MOD 30 MIN: CPT | Performed by: FAMILY MEDICINE

## 2020-10-21 PROCEDURE — 1160F RVW MEDS BY RX/DR IN RCRD: CPT | Performed by: FAMILY MEDICINE

## 2020-10-22 LAB
ALBUMIN SERPL-MCNC: 4.6 G/DL (ref 3.8–4.8)
ALBUMIN/GLOB SERPL: 2 {RATIO} (ref 1.2–2.2)
ALP SERPL-CCNC: 74 IU/L (ref 39–117)
ALT SERPL-CCNC: 18 IU/L (ref 0–32)
AST SERPL-CCNC: 25 IU/L (ref 0–40)
BASOPHILS # BLD AUTO: 0 X10E3/UL (ref 0–0.2)
BASOPHILS NFR BLD AUTO: 1 %
BILIRUB SERPL-MCNC: 0.3 MG/DL (ref 0–1.2)
BUN SERPL-MCNC: 22 MG/DL (ref 8–27)
BUN/CREAT SERPL: 27 (ref 12–28)
CALCIUM SERPL-MCNC: 9.8 MG/DL (ref 8.7–10.3)
CHLORIDE SERPL-SCNC: 102 MMOL/L (ref 96–106)
CO2 SERPL-SCNC: 26 MMOL/L (ref 20–29)
CREAT SERPL-MCNC: 0.82 MG/DL (ref 0.57–1)
EOSINOPHIL # BLD AUTO: 0.1 X10E3/UL (ref 0–0.4)
EOSINOPHIL NFR BLD AUTO: 1 %
ERYTHROCYTE [DISTWIDTH] IN BLOOD BY AUTOMATED COUNT: 13.4 % (ref 11.7–15.4)
GLOBULIN SER-MCNC: 2.3 G/DL (ref 1.5–4.5)
GLUCOSE SERPL-MCNC: 90 MG/DL (ref 65–99)
HCT VFR BLD AUTO: 38.8 % (ref 34–46.6)
HGB BLD-MCNC: 12.9 G/DL (ref 11.1–15.9)
IMM GRANULOCYTES # BLD: 0 X10E3/UL (ref 0–0.1)
IMM GRANULOCYTES NFR BLD: 0 %
LYMPHOCYTES # BLD AUTO: 1.6 X10E3/UL (ref 0.7–3.1)
LYMPHOCYTES NFR BLD AUTO: 27 %
MCH RBC QN AUTO: 28.5 PG (ref 26.6–33)
MCHC RBC AUTO-ENTMCNC: 33.2 G/DL (ref 31.5–35.7)
MCV RBC AUTO: 86 FL (ref 79–97)
MONOCYTES # BLD AUTO: 0.5 X10E3/UL (ref 0.1–0.9)
MONOCYTES NFR BLD AUTO: 9 %
NEUTROPHILS # BLD AUTO: 3.8 X10E3/UL (ref 1.4–7)
NEUTROPHILS NFR BLD AUTO: 62 %
PLATELET # BLD AUTO: 255 X10E3/UL (ref 150–450)
POTASSIUM SERPL-SCNC: 4.9 MMOL/L (ref 3.5–5.2)
PROT SERPL-MCNC: 6.9 G/DL (ref 6–8.5)
RBC # BLD AUTO: 4.53 X10E6/UL (ref 3.77–5.28)
SL AMB EGFR AFRICAN AMERICAN: 85 ML/MIN/1.73
SL AMB EGFR NON AFRICAN AMERICAN: 74 ML/MIN/1.73
SODIUM SERPL-SCNC: 143 MMOL/L (ref 134–144)
TSH SERPL DL<=0.005 MIU/L-ACNC: 1.67 UIU/ML (ref 0.45–4.5)
WBC # BLD AUTO: 6.1 X10E3/UL (ref 3.4–10.8)

## 2020-10-26 ENCOUNTER — TELEMEDICINE (OUTPATIENT)
Dept: FAMILY MEDICINE CLINIC | Facility: CLINIC | Age: 70
End: 2020-10-26
Payer: COMMERCIAL

## 2020-10-26 DIAGNOSIS — R42 VERTIGO: ICD-10-CM

## 2020-10-26 DIAGNOSIS — I10 ESSENTIAL HYPERTENSION: Primary | ICD-10-CM

## 2020-10-26 PROCEDURE — 99213 OFFICE O/P EST LOW 20 MIN: CPT | Performed by: FAMILY MEDICINE

## 2020-11-27 ENCOUNTER — TELEMEDICINE (OUTPATIENT)
Dept: FAMILY MEDICINE CLINIC | Facility: CLINIC | Age: 70
End: 2020-11-27
Payer: COMMERCIAL

## 2020-11-27 DIAGNOSIS — M54.2 NECK PAIN: ICD-10-CM

## 2020-11-27 DIAGNOSIS — S13.9XXD NECK SPRAIN, SUBSEQUENT ENCOUNTER: Primary | ICD-10-CM

## 2020-11-27 DIAGNOSIS — M54.12 CERVICAL RADICULOPATHY: ICD-10-CM

## 2020-11-27 PROBLEM — Z13.29 SCREENING FOR HYPOTHYROIDISM: Status: RESOLVED | Noted: 2020-01-07 | Resolved: 2020-11-27

## 2020-11-27 PROBLEM — Z12.31 ENCOUNTER FOR SCREENING MAMMOGRAM FOR BREAST CANCER: Status: RESOLVED | Noted: 2020-01-07 | Resolved: 2020-11-27

## 2020-11-27 PROBLEM — Z00.00 MEDICARE ANNUAL WELLNESS VISIT, INITIAL: Status: RESOLVED | Noted: 2020-01-07 | Resolved: 2020-11-27

## 2020-11-27 PROBLEM — Z13.220 SCREENING FOR HYPERLIPIDEMIA: Status: RESOLVED | Noted: 2020-01-07 | Resolved: 2020-11-27

## 2020-11-27 PROCEDURE — 99213 OFFICE O/P EST LOW 20 MIN: CPT | Performed by: NURSE PRACTITIONER

## 2020-11-27 PROCEDURE — 1160F RVW MEDS BY RX/DR IN RCRD: CPT | Performed by: NURSE PRACTITIONER

## 2020-11-27 PROCEDURE — 1036F TOBACCO NON-USER: CPT | Performed by: NURSE PRACTITIONER

## 2020-11-27 RX ORDER — METAXALONE 800 MG/1
800 TABLET ORAL 3 TIMES DAILY
Qty: 15 TABLET | Refills: 0 | Status: SHIPPED | OUTPATIENT
Start: 2020-11-27 | End: 2020-11-27

## 2020-11-27 RX ORDER — PREDNISONE 20 MG/1
TABLET ORAL
Qty: 11 TABLET | Refills: 0 | Status: SHIPPED | OUTPATIENT
Start: 2020-11-27 | End: 2020-12-07 | Stop reason: ALTCHOICE

## 2020-11-27 RX ORDER — BACLOFEN 10 MG/1
10 TABLET ORAL 3 TIMES DAILY
Qty: 15 TABLET | Refills: 0 | Status: SHIPPED | OUTPATIENT
Start: 2020-11-27 | End: 2020-12-07 | Stop reason: ALTCHOICE

## 2020-11-27 RX ORDER — PREDNISOLONE SODIUM PHOSPHATE 30 MG/1
30 TABLET, ORALLY DISINTEGRATING ORAL DAILY
Qty: 5 TABLET | Refills: 0 | Status: SHIPPED | OUTPATIENT
Start: 2020-11-27 | End: 2020-11-27

## 2020-12-06 PROBLEM — M25.473 ANKLE SWELLING: Status: ACTIVE | Noted: 2020-12-06

## 2020-12-07 ENCOUNTER — OFFICE VISIT (OUTPATIENT)
Dept: FAMILY MEDICINE CLINIC | Facility: CLINIC | Age: 70
End: 2020-12-07
Payer: COMMERCIAL

## 2020-12-07 VITALS
TEMPERATURE: 97.2 F | DIASTOLIC BLOOD PRESSURE: 82 MMHG | HEIGHT: 62 IN | HEART RATE: 60 BPM | WEIGHT: 151.6 LBS | SYSTOLIC BLOOD PRESSURE: 120 MMHG | BODY MASS INDEX: 27.9 KG/M2 | RESPIRATION RATE: 16 BRPM

## 2020-12-07 DIAGNOSIS — R20.2 NUMBNESS AND TINGLING OF FOOT: Primary | ICD-10-CM

## 2020-12-07 DIAGNOSIS — R20.0 NUMBNESS AND TINGLING OF FOOT: Primary | ICD-10-CM

## 2020-12-07 PROCEDURE — 1160F RVW MEDS BY RX/DR IN RCRD: CPT | Performed by: FAMILY MEDICINE

## 2020-12-07 PROCEDURE — 99213 OFFICE O/P EST LOW 20 MIN: CPT | Performed by: FAMILY MEDICINE

## 2020-12-07 PROCEDURE — 3079F DIAST BP 80-89 MM HG: CPT | Performed by: FAMILY MEDICINE

## 2020-12-07 PROCEDURE — 1036F TOBACCO NON-USER: CPT | Performed by: FAMILY MEDICINE

## 2020-12-07 PROCEDURE — 3074F SYST BP LT 130 MM HG: CPT | Performed by: FAMILY MEDICINE

## 2020-12-07 RX ORDER — PREDNISONE 20 MG/1
TABLET ORAL
Qty: 26 TABLET | Refills: 0 | Status: SHIPPED | OUTPATIENT
Start: 2020-12-07 | End: 2021-04-15

## 2020-12-07 RX ORDER — COVID-19 ANTIGEN TEST
KIT MISCELLANEOUS DAILY PRN
COMMUNITY

## 2020-12-17 ENCOUNTER — TELEMEDICINE (OUTPATIENT)
Dept: FAMILY MEDICINE CLINIC | Facility: CLINIC | Age: 70
End: 2020-12-17
Payer: COMMERCIAL

## 2020-12-17 ENCOUNTER — TELEPHONE (OUTPATIENT)
Dept: FAMILY MEDICINE CLINIC | Facility: CLINIC | Age: 70
End: 2020-12-17

## 2020-12-17 VITALS — HEIGHT: 62 IN | TEMPERATURE: 97 F | WEIGHT: 146 LBS | BODY MASS INDEX: 26.87 KG/M2

## 2020-12-17 DIAGNOSIS — K12.0 APHTHOUS ULCER OF MOUTH: Primary | ICD-10-CM

## 2020-12-17 DIAGNOSIS — K21.9 GASTROESOPHAGEAL REFLUX DISEASE WITHOUT ESOPHAGITIS: ICD-10-CM

## 2020-12-17 DIAGNOSIS — M47.812 OSTEOARTHRITIS OF CERVICAL SPINE, UNSPECIFIED SPINAL OSTEOARTHRITIS COMPLICATION STATUS: ICD-10-CM

## 2020-12-17 PROCEDURE — 3008F BODY MASS INDEX DOCD: CPT | Performed by: FAMILY MEDICINE

## 2020-12-17 PROCEDURE — 99213 OFFICE O/P EST LOW 20 MIN: CPT | Performed by: FAMILY MEDICINE

## 2020-12-17 NOTE — TELEPHONE ENCOUNTER
Pt sent a [employer] message and I would like to see her in the office tomorrow if possible or if she would like to schedule a virtual with Dr Sarita Alan today - I responded and told clerical would be contacting her at some point today or tomorrow  Thanks  I have [address] [place] [address] Street Dr Sarita Alan as Dylan Guzman mentioned she sent him the message first and it did not go through       [facility]

## 2020-12-17 NOTE — TELEPHONE ENCOUNTER
I called Chace Martinez to offer her a virtual appt today or schedule an in office appt tomorrow per Margaret Martinez opted for a virtual today  No further action required

## 2020-12-20 PROBLEM — K12.0 APHTHOUS ULCER OF MOUTH: Status: ACTIVE | Noted: 2020-12-20

## 2021-01-04 ENCOUNTER — TELEPHONE (OUTPATIENT)
Dept: FAMILY MEDICINE CLINIC | Facility: CLINIC | Age: 71
End: 2021-01-04

## 2021-01-04 DIAGNOSIS — I47.1 SVT (SUPRAVENTRICULAR TACHYCARDIA) (HCC): ICD-10-CM

## 2021-01-04 DIAGNOSIS — E04.1 THYROID NODULE: ICD-10-CM

## 2021-01-04 DIAGNOSIS — R20.2 NUMBNESS AND TINGLING OF FOOT: ICD-10-CM

## 2021-01-04 DIAGNOSIS — R13.10 DYSPHAGIA, UNSPECIFIED TYPE: ICD-10-CM

## 2021-01-04 DIAGNOSIS — M25.473 ANKLE SWELLING, UNSPECIFIED LATERALITY: ICD-10-CM

## 2021-01-04 DIAGNOSIS — I10 ESSENTIAL HYPERTENSION: ICD-10-CM

## 2021-01-04 DIAGNOSIS — R20.0 NUMBNESS AND TINGLING OF FOOT: ICD-10-CM

## 2021-01-04 DIAGNOSIS — K21.9 GASTROESOPHAGEAL REFLUX DISEASE WITHOUT ESOPHAGITIS: Primary | ICD-10-CM

## 2021-01-04 DIAGNOSIS — M19.90 ARTHRITIS: ICD-10-CM

## 2021-01-04 NOTE — TELEPHONE ENCOUNTER
Is coming in on 1/8 for her awv  She never received the bw orders  They were not place    Please place the orders to [employer] (jeferson Garcias)    Call when orders are ready

## 2021-01-04 NOTE — TELEPHONE ENCOUNTER
Informed Mira Just that the bw has been place  I faxed the orders to [employer] in [place]   Explained that they should be able to see them in the system

## 2021-01-08 ENCOUNTER — OFFICE VISIT (OUTPATIENT)
Dept: FAMILY MEDICINE CLINIC | Facility: CLINIC | Age: 71
End: 2021-01-08
Payer: COMMERCIAL

## 2021-01-08 VITALS
HEIGHT: 63 IN | HEART RATE: 54 BPM | BODY MASS INDEX: 27.21 KG/M2 | OXYGEN SATURATION: 98 % | DIASTOLIC BLOOD PRESSURE: 60 MMHG | RESPIRATION RATE: 16 BRPM | TEMPERATURE: 96.4 F | SYSTOLIC BLOOD PRESSURE: 144 MMHG | WEIGHT: 153.6 LBS

## 2021-01-08 DIAGNOSIS — Z00.00 MEDICARE ANNUAL WELLNESS VISIT, SUBSEQUENT: ICD-10-CM

## 2021-01-08 DIAGNOSIS — G99.2 STENOSIS OF CERVICAL SPINE WITH MYELOPATHY (HCC): ICD-10-CM

## 2021-01-08 DIAGNOSIS — M48.062 SPINAL STENOSIS OF LUMBAR REGION WITH NEUROGENIC CLAUDICATION: ICD-10-CM

## 2021-01-08 DIAGNOSIS — I47.1 SVT (SUPRAVENTRICULAR TACHYCARDIA) (HCC): ICD-10-CM

## 2021-01-08 DIAGNOSIS — M48.02 STENOSIS OF CERVICAL SPINE WITH MYELOPATHY (HCC): ICD-10-CM

## 2021-01-08 DIAGNOSIS — M19.90 ARTHRITIS: ICD-10-CM

## 2021-01-08 DIAGNOSIS — E04.1 THYROID NODULE: ICD-10-CM

## 2021-01-08 DIAGNOSIS — K21.9 GASTROESOPHAGEAL REFLUX DISEASE WITHOUT ESOPHAGITIS: ICD-10-CM

## 2021-01-08 DIAGNOSIS — I10 ESSENTIAL HYPERTENSION: Primary | ICD-10-CM

## 2021-01-08 PROCEDURE — 3078F DIAST BP <80 MM HG: CPT | Performed by: FAMILY MEDICINE

## 2021-01-08 PROCEDURE — 1036F TOBACCO NON-USER: CPT | Performed by: FAMILY MEDICINE

## 2021-01-08 PROCEDURE — 1125F AMNT PAIN NOTED PAIN PRSNT: CPT | Performed by: FAMILY MEDICINE

## 2021-01-08 PROCEDURE — 81003 URINALYSIS AUTO W/O SCOPE: CPT | Performed by: FAMILY MEDICINE

## 2021-01-08 PROCEDURE — 3077F SYST BP >= 140 MM HG: CPT | Performed by: FAMILY MEDICINE

## 2021-01-08 PROCEDURE — 93000 ELECTROCARDIOGRAM COMPLETE: CPT | Performed by: FAMILY MEDICINE

## 2021-01-08 PROCEDURE — 99215 OFFICE O/P EST HI 40 MIN: CPT | Performed by: FAMILY MEDICINE

## 2021-01-08 PROCEDURE — 3288F FALL RISK ASSESSMENT DOCD: CPT | Performed by: FAMILY MEDICINE

## 2021-01-08 PROCEDURE — 3008F BODY MASS INDEX DOCD: CPT | Performed by: FAMILY MEDICINE

## 2021-01-08 PROCEDURE — G0439 PPPS, SUBSEQ VISIT: HCPCS | Performed by: FAMILY MEDICINE

## 2021-01-08 PROCEDURE — 3725F SCREEN DEPRESSION PERFORMED: CPT | Performed by: FAMILY MEDICINE

## 2021-01-08 PROCEDURE — 1170F FXNL STATUS ASSESSED: CPT | Performed by: FAMILY MEDICINE

## 2021-01-08 RX ORDER — HYDROCHLOROTHIAZIDE 12.5 MG/1
12.5 TABLET ORAL DAILY
Qty: 90 TABLET | Refills: 1 | Status: SHIPPED | OUTPATIENT
Start: 2021-01-08 | End: 2021-04-15

## 2021-01-08 RX ORDER — LOSARTAN POTASSIUM 100 MG/1
100 TABLET ORAL DAILY
Qty: 90 TABLET | Refills: 1 | Status: SHIPPED | OUTPATIENT
Start: 2021-01-08 | End: 2021-07-09

## 2021-01-08 RX ORDER — HYDROCODONE BITARTRATE AND ACETAMINOPHEN 5; 325 MG/1; MG/1
1 TABLET ORAL EVERY 6 HOURS PRN
Qty: 20 TABLET | Refills: 0 | Status: SHIPPED | OUTPATIENT
Start: 2021-01-08 | End: 2021-04-15

## 2021-01-08 NOTE — PATIENT INSTRUCTIONS
DISCUSSED HEALTH ISSUES  HEALTHY DIET AND EXERCISE  BW WILL BE OBTAINED  MAMMOGRAPHY   RECOMMEND CALCIUM 6021-4418 MG DAILY  VITAMIN D3  1000 IU DAILY  RV IN 1 YEAR FOR ANNUAL EXAM, SOONER IF NEEDED      Medicare Preventive Visit Patient Instructions  Thank you for completing your Welcome to Medicare Visit or Medicare Annual Wellness Visit today  Your next wellness visit will be due in one year (1/8/2022)  The screening/preventive services that you may require over the next 5-10 years are detailed below  Some tests may not apply to you based off risk factors and/or age  Screening tests ordered at today's visit but not completed yet may show as past due  Also, please note that scanned in results may not display below  Preventive Screenings:  Service Recommendations Previous Testing/Comments   Colorectal Cancer Screening  * Colonoscopy    * Fecal Occult Blood Test (FOBT)/Fecal Immunochemical Test (FIT)  * Fecal DNA/Cologuard Test  * Flexible Sigmoidoscopy Age: 54-65 years old   Colonoscopy: every 10 years (may be performed more frequently if at higher risk)  OR  FOBT/FIT: every 1 year  OR  Cologuard: every 3 years  OR  Sigmoidoscopy: every 5 years  Screening may be recommended earlier than age 48 if at higher risk for colorectal cancer  Also, an individualized decision between you and your healthcare provider will decide whether screening between the ages of 74-80 would be appropriate  Colonoscopy: 04/23/2014  FOBT/FIT: Not on file  Cologuard: Not on file  Sigmoidoscopy: Not on file    Screening Current     Breast Cancer Screening Age: 36 years old  Frequency: every 1-2 years  Not required if history of left and right mastectomy Mammogram: 05/29/2020    Screening Current   Cervical Cancer Screening Between the ages of 21-29, pap smear recommended once every 3 years  Between the ages of 33-67, can perform pap smear with HPV co-testing every 5 years     Recommendations may differ for women with a history of total hysterectomy, cervical cancer, or abnormal pap smears in past  Pap Smear: 11/23/2018    Screening Not Indicated   Hepatitis C Screening Once for adults born between 1945 and 1965  More frequently in patients at high risk for Hepatitis C Hep C Antibody: Not on file    Screening Current   Diabetes Screening 1-2 times per year if you're at risk for diabetes or have pre-diabetes Fasting glucose: No results in last 5 years   A1C: 5 6 %    Screening Current   Cholesterol Screening Once every 5 years if you don't have a lipid disorder  May order more often based on risk factors  Lipid panel: 12/30/2019    Screening Current     Other Preventive Screenings Covered by Medicare:  1  Abdominal Aortic Aneurysm (AAA) Screening: covered once if your at risk  You're considered to be at risk if you have a family history of AAA  2  Lung Cancer Screening: covers low dose CT scan once per year if you meet all of the following conditions: (1) Age 50-69; (2) No signs or symptoms of lung cancer; (3) Current smoker or have quit smoking within the last 15 years; (4) You have a tobacco smoking history of at least 30 pack years (packs per day multiplied by number of years you smoked); (5) You get a written order from a healthcare provider  3  Glaucoma Screening: covered annually if you're considered high risk: (1) You have diabetes OR (2) Family history of glaucoma OR (3)  aged 48 and older OR (3)  American aged 72 and older  3  Osteoporosis Screening: covered every 2 years if you meet one of the following conditions: (1) You're estrogen deficient and at risk for osteoporosis based off medical history and other findings; (2) Have a vertebral abnormality; (3) On glucocorticoid therapy for more than 3 months; (4) Have primary hyperparathyroidism; (5) On osteoporosis medications and need to assess response to drug therapy  · Last bone density test (DXA Scan): Not on file    5  HIV Screening: covered annually if you're between the age of 12-76  Also covered annually if you are younger than 13 and older than 72 with risk factors for HIV infection  For pregnant patients, it is covered up to 3 times per pregnancy  Immunizations:  Immunization Recommendations   Influenza Vaccine Annual influenza vaccination during flu season is recommended for all persons aged >= 6 months who do not have contraindications   Pneumococcal Vaccine (Prevnar and Pneumovax)  * Prevnar = PCV13  * Pneumovax = PPSV23   Adults 25-60 years old: 1-3 doses may be recommended based on certain risk factors  Adults 72 years old: Prevnar (PCV13) vaccine recommended followed by Pneumovax (PPSV23) vaccine  If already received PPSV23 since turning 65, then PCV13 recommended at least one year after PPSV23 dose  Hepatitis B Vaccine 3 dose series if at intermediate or high risk (ex: diabetes, end stage renal disease, liver disease)   Tetanus (Td) Vaccine - COST NOT COVERED BY MEDICARE PART B Following completion of primary series, a booster dose should be given every 10 years to maintain immunity against tetanus  Td may also be given as tetanus wound prophylaxis  Tdap Vaccine - COST NOT COVERED BY MEDICARE PART B Recommended at least once for all adults  For pregnant patients, recommended with each pregnancy  Shingles Vaccine (Shingrix) - COST NOT COVERED BY MEDICARE PART B  2 shot series recommended in those aged 48 and above     Health Maintenance Due:      Topic Date Due    MAMMOGRAM  05/29/2021    Cervical Cancer Screening  11/23/2023    Colorectal Cancer Screening  04/23/2024    Hepatitis C Screening  Addressed     Immunizations Due:  There are no preventive care reminders to display for this patient  Advance Directives   What are advance directives? Advance directives are legal documents that state your wishes and plans for medical care  These plans are made ahead of time in case you lose your ability to make decisions for yourself   Advance directives can apply to any medical decision, such as the treatments you want, and if you want to donate organs  What are the types of advance directives? There are many types of advance directives, and each state has rules about how to use them  You may choose a combination of any of the following:  · Living will: This is a written record of the treatment you want  You can also choose which treatments you do not want, which to limit, and which to stop at a certain time  This includes surgery, medicine, IV fluid, and tube feedings  · Durable power of  for healthcare [facility]): This is a written record that states who you want to make healthcare choices for you when you are unable to make them for yourself  This person, called a proxy, is usually a family member or a friend  You may choose more than 1 proxy  · Do not resuscitate (DNR) order:  A DNR order is used in case your heart stops beating or you stop breathing  It is a request not to have certain forms of treatment, such as CPR  A DNR order may be included in other types of advance directives  · Medical directive: This covers the care that you want if you are in a coma, near death, or unable to make decisions for yourself  You can list the treatments you want for each condition  Treatment may include pain medicine, surgery, blood transfusions, dialysis, IV or tube feedings, and a ventilator (breathing machine)  · Values history: This document has questions about your views, beliefs, and how you feel and think about life  This information can help others choose the care that you would choose  Why are advance directives important? An advance directive helps you control your care  Although spoken wishes may be used, it is better to have your wishes written down  Spoken wishes can be misunderstood, or not followed  Treatments may be given even if you do not want them   An advance directive may make it easier for your family to make difficult choices about your care  Urinary Incontinence   Urinary incontinence (UI)  is when you lose control of your bladder  UI develops because your bladder cannot store or empty urine properly  The 3 most common types of UI are stress incontinence, urge incontinence, or both  Medicines:   · May be given to help strengthen your bladder control  Report any side effects of medication to your healthcare provider  Do pelvic muscle exercises often:  Your pelvic muscles help you stop urinating  Squeeze these muscles tight for 5 seconds, then relax for 5 seconds  Gradually work up to squeezing for 10 seconds  Do 3 sets of 15 repetitions a day, or as directed  This will help strengthen your pelvic muscles and improve bladder control  Train your bladder:  Go to the bathroom at set times, such as every 2 hours, even if you do not feel the urge to go  You can also try to hold your urine when you feel the urge to go  For example, hold your urine for 5 minutes when you feel the urge to go  As that becomes easier, hold your urine for 10 minutes  Self-care:   · Keep a UI record  Write down how often you leak urine and how much you leak  Make a note of what you were doing when you leaked urine  · Drink liquids as directed  You may need to limit the amount of liquid you drink to help control your urine leakage  Do not drink any liquid right before you go to bed  Limit or do not have drinks that contain caffeine or alcohol  · Prevent constipation  Eat a variety of high-fiber foods  Good examples are high-fiber cereals, beans, vegetables, and whole-grain breads  Walking is the best way to trigger your intestines to have a bowel movement  · Exercise regularly and maintain a healthy weight  Weight loss and exercise will decrease pressure on your bladder and help you control your leakage  · Use a catheter as directed  to help empty your bladder  A catheter is a tiny, plastic tube that is put into your bladder to drain your urine     · Go to behavior therapy as directed  Behavior therapy may be used to help you learn to control your urge to urinate  Weight Management   Why it is important to manage your weight:  Being overweight increases your risk of health conditions such as heart disease, high blood pressure, type 2 diabetes, and certain types of cancer  It can also increase your risk for osteoarthritis, sleep apnea, and other respiratory problems  Aim for a slow, steady weight loss  Even a small amount of weight loss can lower your risk of health problems  How to lose weight safely:  A safe and healthy way to lose weight is to eat fewer calories and get regular exercise  You can lose up about 1 pound a week by decreasing the number of calories you eat by 500 calories each day  Healthy meal plan for weight management:  A healthy meal plan includes a variety of foods, contains fewer calories, and helps you stay healthy  A healthy meal plan includes the following:  · Eat whole-grain foods more often  A healthy meal plan should contain fiber  Fiber is the part of grains, fruits, and vegetables that is not broken down by your body  Whole-grain foods are healthy and provide extra fiber in your diet  Some examples of whole-grain foods are whole-wheat breads and pastas, oatmeal, brown rice, and bulgur  · Eat a variety of vegetables every day  Include dark, leafy greens such as spinach, kale, laura greens, and mustard greens  Eat yellow and orange vegetables such as carrots, sweet potatoes, and winter squash  · Eat a variety of fruits every day  Choose fresh or canned fruit (canned in its own juice or light syrup) instead of juice  Fruit juice has very little or no fiber  · Eat low-fat dairy foods  Drink fat-free (skim) milk or 1% milk  Eat fat-free yogurt and low-fat cottage cheese  Try low-fat cheeses such as mozzarella and other reduced-fat cheeses  · Choose meat and other protein foods that are low in fat    Choose beans or other legumes such as split peas or lentils  Choose fish, skinless poultry (chicken or turkey), or lean cuts of red meat (beef or pork)  Before you cook meat or poultry, cut off any visible fat  · Use less fat and oil  Try baking foods instead of frying them  Add less fat, such as margarine, sour cream, regular salad dressing and mayonnaise to foods  Eat fewer high-fat foods  Some examples of high-fat foods include french fries, doughnuts, ice cream, and cakes  · Eat fewer sweets  Limit foods and drinks that are high in sugar  This includes candy, cookies, regular soda, and sweetened drinks  Exercise:  Exercise at least 30 minutes per day on most days of the week  Some examples of exercise include walking, biking, dancing, and swimming  You can also fit in more physical activity by taking the stairs instead of the elevator or parking farther away from stores  Ask your healthcare provider about the best exercise plan for you  © Copyright [employer] 2018 Information is for End User's use only and may not be sold, redistributed or otherwise used for commercial purposes   All illustrations and images included in CareNotes® are the copyrighted property of [employer]  or [address] [place] [address]

## 2021-01-08 NOTE — PROGRESS NOTES
Assessment and Plan:     Problem List Items Addressed This Visit        Digestive    Gastroesophageal reflux disease without esophagitis     STABLE  DENIES ANY CP, INDIGESTION, OR COUGH  NOTES NO MELENA OR HEMATOCHEZIA  NO HEMATEMESIS  COMPLIANT WITH MEDICATION  NO CONCERNS    - CONTINUE CURRENT TREATMENT PLAN  - MEDICATION AS PRESCRIBED  - AVOID CAFFEINE, ALCOHOL OR SMOKING              Endocrine    Thyroid nodule    Relevant Orders    US thyroid    TSH, 3rd generation       Cardiovascular and Mediastinum    Hypertension - Primary     STABLE  DENIES ANY CP, SOB, PALPITATIONS, OR HEADACHE  NOTES NO WATER RETENTION  COMPLIANT WITH MEDICATION  NO CONCERNS    - CONTINUE CURRENT TREATMENT PLAN  - MONITOR DIETARY SODIUM INTAKE  - ENCOURAGE PHYSICAL ACTIVITY  - RV 6 MONTHS           Relevant Medications    losartan (COZAAR) 100 MG tablet    hydrochlorothiazide (HYDRODIURIL) 12 5 mg tablet    Other Relevant Orders    POCT ECG (Completed)    POCT urine dip auto non-scope (Completed)    Comprehensive metabolic panel    Lipid panel    Uric acid    SVT (supraventricular tachycardia) (HCC)     STABLE         Relevant Orders    TSH, 3rd generation    CBC and differential       Nervous and Auditory    Stenosis of cervical spine with myelopathy (HCC)    Relevant Medications    HYDROcodone-acetaminophen (NORCO) 5-325 mg per tablet    Other Relevant Orders    Sedimentation rate, automated    C-reactive protein       Musculoskeletal and Integument    Arthritis     STABLE  DENIES ANY JOINT SWELLING OR REDNESS  JOINT STIFFNESS PRESENT  PAIN MANAGEMENT ADEQUATE    - CONTINUE CURRENT MANAGEMENT  - MEDICATION AS DIRECTED  - CALL / RETURN IF SYMPTOMS WORSEN           Relevant Orders    CBC and differential       Other    Spinal stenosis of lumbar region with neurogenic claudication    Relevant Medications    HYDROcodone-acetaminophen (NORCO) 5-325 mg per tablet    Other Relevant Orders    Sedimentation rate, automated    C-reactive protein    Medicare annual wellness visit, subsequent    Relevant Orders    POCT urine dip auto non-scope (Completed)           Preventive health issues were discussed with patient, and age appropriate screening tests were ordered as noted in patient's After Visit Summary  Personalized health advice and appropriate referrals for health education or preventive services given if needed, as noted in patient's After Visit Summary       History of Present Illness:     Patient presents for Medicare Annual Wellness visit    Patient Care Team:  Santina Harada, MD as PCP - General (Family Medicine)  Emi Abreu MD (Gynecology)  Alisha Grace MD (Ophthalmology)  Greta Umanzor (Cardiology)  Jess Ocasio MD (Gastroenterology)  Sunita Menendez DO (Dermatology)  Yue Soto MD (Neurosurgery)  Kleber Cohen DPM (Podiatry)     Problem List:     Patient Active Problem List   Diagnosis    Thyroid nodule    Dysphagia    Arthritis    Spinal stenosis of lumbar region with neurogenic claudication    Enthesopathy of lower extremity    Hypertension    Osteoarthritis of cervical spine    Osteoarthritis of left knee    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    Gastroesophageal reflux disease without esophagitis    Neck sprain    Stenosis of cervical spine with myelopathy (HCC)    SVT (supraventricular tachycardia) (HCC)    Vertigo    Ankle swelling    Numbness and tingling of foot    Aphthous ulcer of mouth    Medicare annual wellness visit, subsequent      Past Medical and Surgical History:     Past Medical History:   Diagnosis Date    Arthritis     Enthesopathy of lower extremity     last assessed 08/21/2015    GERD (gastroesophageal reflux disease)     High blood pressure     History of basal cell cancer     Ocular migraine     SVT [facility]supraventricular tachycardia) ([facility] [address] )      Past Surgical History:   Procedure Laterality Date    CERVICAL DISCECTOMY  2018    [facility] LAMINECTOMY  2000    MOHS SURGERY  2017    Basal Cell[facility]      Family History:     Family History   Problem Relation Age of Onset    Arthritis Mother     Cancer Mother     Esophageal cancer Mother     Lung cancer Father     Cancer Father     Hypertension Father     Stroke Maternal Grandmother     Heart disease Maternal Grandfather     Substance Abuse Neg Hx     Mental illness Neg Hx       Social History:     E-Cigarette/Vaping    E-Cigarette Use Never User      E-Cigarette/Vaping Substances    Nicotine No     THC No     CBD No     Flavoring No     Other No     Unknown No      Social History     Socioeconomic History    Marital status:      Spouse name: None    Number of children: None    Years of education: None    Highest education level: None   Occupational History    None   Social Needs    Financial resource strain: None    Food insecurity     Worry: None     Inability: None    Transportation needs     Medical: None     Non-medical: None   Tobacco Use    Smoking status: Never Smoker    Smokeless tobacco: Never Used   Substance and Sexual Activity    Alcohol use: Yes     Frequency: 2-3 times a week     Drinks per session: 1 or 2     Comment: occ    Drug use: No    Sexual activity: None   Lifestyle    Physical activity     Days per week: None     Minutes per session: None    Stress: None   Relationships    Social connections     Talks on phone: None     Gets together: None     Attends Buddhist service: None     Active member of club or organization: None     Attends meetings of clubs or organizations: None     Relationship status: None    Intimate partner violence     Fear of current or ex partner: None     Emotionally abused: None     Physically abused: None     Forced sexual activity: None   Other Topics Concern    None   Social History Narrative    Active advance directive    Caffeine use    Dental care regularly    Drinks coffee      Medications and Allergies:     Current Outpatient Medications   Medication Sig Dispense Refill    Cholecalciferol 2000 units CAPS Take 1 capsule by mouth      EPINEPHrine (EPIPEN) 0 3 mg/0 3 mL SOAJ Inject 0 3 mL (0 3 mg total) into a muscle once for 1 dose 0 6 mL 0    hydrochlorothiazide (HYDRODIURIL) 12 5 mg tablet Take 1 tablet (12 5 mg total) by mouth daily 90 tablet 1    losartan (COZAAR) 100 MG tablet Take 1 tablet (100 mg total) by mouth daily 90 tablet 1    Methylcobalamin (B12-ACTIVE PO) Take by mouth      Multiple Vitamins-Minerals (OCUVITE ADULT FORMULA PO) Take by mouth daily      Naproxen Sodium (Aleve) 220 MG CAPS Take by mouth daily as needed      pantoprazole (PROTONIX) 40 mg tablet Take 1 tablet by mouth Daily      PREMARIN vaginal cream Insert into the vagina 2 (two) times a week       HYDROcodone-acetaminophen (NORCO) 5-325 mg per tablet Take 1 tablet by mouth every 6 (six) hours as needed for painMax Daily Amount: 4 tablets 20 tablet 0    predniSONE 20 mg tablet TAKE 3 TABS PO QD X 4 DAYS THEN 2 TABS PO QD X 4 DAYS THEN 1 TAB PO QD X 4 DAYS THEN 1/2 TAB PO QD X YDAYS (Patient not taking: Reported on 1/8/2021) 26 tablet 0     No current facility-administered medications for this visit        Allergies   Allergen Reactions    Amoxicillin GI Intolerance     Other reaction(s): Nausea and/or vomiting  Other reaction(s): Nausea and/or vomiting    Amoxicillin-Pot Clavulanate GI Intolerance     Other reaction(s): Nausea and/or vomiting  AUGMENTIN    Erythromycin GI Intolerance     Other reaction(s): Nausea and/or vomiting  [facility] - [id number]: vomiting  Other reaction(s): Nausea and/or vomiting  [facility] - [id number]: vomiting    Sunflower Oil      And sunflower seeds      Immunizations:     Immunization History   Administered Date(s) Administered    Influenza Split High Dose Preservative Free IM 10/04/2017    Influenza, high dose seasonal 0 7 mL 09/19/2018, 10/24/2019, 09/17/2020    Influenza, seasonal, injectable 09/01/2016    Pneumococcal Conjugate 13-Valent 11/22/2017    Pneumococcal Polysaccharide PPV23 10/02/2020    TD (adult) Preservative Free 03/08/2015      Health Maintenance:         Topic Date Due    MAMMOGRAM  05/29/2021    Cervical Cancer Screening  11/23/2023    Colorectal Cancer Screening  04/23/2024    Hepatitis C Screening  Addressed     There are no preventive care reminders to display for this patient  Medicare Health Risk Assessment:     /60   Pulse (!) 54   Temp (!) 96 4 °F (35 8 °C) (Temporal)   Resp 16   Ht 5' 2 5" (1 588 m)   Wt 69 7 kg (153 lb 9 6 oz)   SpO2 98%   BMI 27 65 kg/m²      Gadiel Graham is here for her Subsequent Wellness visit  Health Risk Assessment:   Patient rates overall health as good  Patient feels that their physical health rating is slightly worse  Eyesight was rated as same  Hearing was rated as same  Patient feels that their emotional and mental health rating is same  Pain experienced in the last 7 days has been a lot  Patient's pain rating has been 7/10  Patient states that she has experienced no weight loss or gain in last 6 months  Experienced pain stemming from cervical spine , pain peaked on 12/25 which I withdraw rate a 7  It has since lessened quite a bit in its intensity  Aleve , rest, and heat has been effective  Depression Screening:   PHQ-2 Score: 0      Fall Risk Screening: In the past year, patient has experienced: no history of falling in past year      Urinary Incontinence Screening:   Patient has leaked urine accidently in the last six months  Sporadic tiny leaks  Not frequent  Home Safety:  Patient does not have trouble with stairs inside or outside of their home  Patient has working smoke alarms and has working carbon monoxide detector  Home safety hazards include: poor household lighting  Nutrition:   Current diet is Regular and Limited junk food   I limit salt intake    Medications:   Patient is currently taking over-the-counter supplements  OTC medications include: Aleve, vitamin D , B, Ocuvite  Patient is able to manage medications  Activities of Daily Living (ADLs)/Instrumental Activities of Daily Living (IADLs):   Walk and transfer into and out of bed and chair?: Yes  Dress and groom yourself?: Yes    Bathe or shower yourself?: Yes    Feed yourself? Yes  Do your laundry/housekeeping?: Yes  Manage your money, pay your bills and track your expenses?: Yes  Make your own meals?: Yes    Do your own shopping?: Yes    Previous Hospitalizations:   Any hospitalizations or ED visits within the last 12 months?: Yes    How many hospitalizations have you had in the last year?: 1-2    Hospitalization Comments: Went to ER for allergic reaction      Advance Care Planning:   Living will: Yes    Durable POA for healthcare: No    Advanced directive: Yes    Provider agrees with end of life decisions: Yes      Cognitive Screening:   Provider or family/friend/caregiver concerned regarding cognition?: No    PREVENTIVE SCREENINGS      Cardiovascular Screening:    General: Screening Current      Diabetes Screening:     General: Screening Current      Colorectal Cancer Screening:     General: Screening Current      Breast Cancer Screening:     General: Screening Current      Cervical Cancer Screening:    General: Screening Not Indicated      Osteoporosis Screening:    General: Screening Current      Abdominal Aortic Aneurysm (AAA) Screening:        General: Screening Not Indicated      Lung Cancer Screening:     General: Screening Not Indicated      Hepatitis C Screening:    General: Screening Current      Jacinto Latham MD

## 2021-01-08 NOTE — PROGRESS NOTES
BMI Counseling: Body mass index is 27 65 kg/m²  The BMI is above normal  Nutrition recommendations include encouraging healthy choices of fruits and vegetables and moderation in carbohydrate intake  Exercise recommendations include exercising 3-5 times per week  No pharmacotherapy was ordered  Assessment/Plan:    Hypertension  STABLE  DENIES ANY CP, SOB, PALPITATIONS, OR HEADACHE  NOTES NO WATER RETENTION  COMPLIANT WITH MEDICATION  NO CONCERNS    - CONTINUE CURRENT TREATMENT PLAN  - MONITOR DIETARY SODIUM INTAKE  - ENCOURAGE PHYSICAL ACTIVITY  - RV 6 MONTHS      SVT (supraventricular tachycardia) (HCC)  STABLE    Gastroesophageal reflux disease without esophagitis  STABLE  DENIES ANY CP, INDIGESTION, OR COUGH  NOTES NO MELENA OR HEMATOCHEZIA  NO HEMATEMESIS  COMPLIANT WITH MEDICATION  NO CONCERNS    - CONTINUE CURRENT TREATMENT PLAN  - MEDICATION AS PRESCRIBED  - AVOID CAFFEINE, ALCOHOL OR SMOKING      Arthritis  STABLE  DENIES ANY JOINT SWELLING OR REDNESS  JOINT STIFFNESS PRESENT  PAIN MANAGEMENT ADEQUATE    - CONTINUE CURRENT MANAGEMENT  - MEDICATION AS DIRECTED  - CALL / RETURN IF SYMPTOMS WORSEN         Diagnoses and all orders for this visit:    Essential hypertension  -     POCT ECG  -     POCT urine dip auto non-scope  -     losartan (COZAAR) 100 MG tablet; Take 1 tablet (100 mg total) by mouth daily  -     hydrochlorothiazide (HYDRODIURIL) 12 5 mg tablet; Take 1 tablet (12 5 mg total) by mouth daily  -     Comprehensive metabolic panel  -     Lipid panel  -     Uric acid    SVT (supraventricular tachycardia) (HCC)  -     TSH, 3rd generation  -     CBC and differential    Gastroesophageal reflux disease without esophagitis    Arthritis  -     CBC and differential    Spinal stenosis of lumbar region with neurogenic claudication  -     HYDROcodone-acetaminophen (NORCO) 5-325 mg per tablet;  Take 1 tablet by mouth every 6 (six) hours as needed for painMax Daily Amount: 4 tablets  -     Sedimentation rate, automated  -     C-reactive protein    Thyroid nodule  -     US thyroid; Future  -     TSH, 3rd generation    Medicare annual wellness visit, subsequent  -     POCT urine dip auto non-scope    Stenosis of cervical spine with myelopathy (HCC)  -     HYDROcodone-acetaminophen (NORCO) 5-325 mg per tablet; Take 1 tablet by mouth every 6 (six) hours as needed for painMax Daily Amount: 4 tablets  -     Sedimentation rate, automated  -     C-reactive protein          Patient Instructions     DISCUSSED HEALTH ISSUES  HEALTHY DIET AND EXERCISE  BW WILL BE OBTAINED  MAMMOGRAPHY   RECOMMEND CALCIUM 9587-7593 MG DAILY  VITAMIN D3  1000 IU DAILY  RV IN 1 YEAR FOR ANNUAL EXAM, SOONER IF NEEDED      Medicare Preventive Visit Patient Instructions  Thank you for completing your Welcome to Medicare Visit or Medicare Annual Wellness Visit today  Your next wellness visit will be due in one year (1/8/2022)  The screening/preventive services that you may require over the next 5-10 years are detailed below  Some tests may not apply to you based off risk factors and/or age  Screening tests ordered at today's visit but not completed yet may show as past due  Also, please note that scanned in results may not display below  Preventive Screenings:  Service Recommendations Previous Testing/Comments   Colorectal Cancer Screening  * Colonoscopy    * Fecal Occult Blood Test (FOBT)/Fecal Immunochemical Test (FIT)  * Fecal DNA/Cologuard Test  * Flexible Sigmoidoscopy Age: 54-65 years old   Colonoscopy: every 10 years (may be performed more frequently if at higher risk)  OR  FOBT/FIT: every 1 year  OR  Cologuard: every 3 years  OR  Sigmoidoscopy: every 5 years  Screening may be recommended earlier than age 48 if at higher risk for colorectal cancer  Also, an individualized decision between you and your healthcare provider will decide whether screening between the ages of 74-80 would be appropriate   Colonoscopy: 04/23/2014  FOBT/FIT: Not on file  Cologuard: Not on file  Sigmoidoscopy: Not on file    Screening Current     Breast Cancer Screening Age: 36 years old  Frequency: every 1-2 years  Not required if history of left and right mastectomy Mammogram: 05/29/2020    Screening Current   Cervical Cancer Screening Between the ages of 21-29, pap smear recommended once every 3 years  Between the ages of 33-67, can perform pap smear with HPV co-testing every 5 years  Recommendations may differ for women with a history of total hysterectomy, cervical cancer, or abnormal pap smears in past  Pap Smear: 11/23/2018    Screening Not Indicated   Hepatitis C Screening Once for adults born between 1945 and 1965  More frequently in patients at high risk for Hepatitis C Hep C Antibody: Not on file    Screening Current   Diabetes Screening 1-2 times per year if you're at risk for diabetes or have pre-diabetes Fasting glucose: No results in last 5 years   A1C: 5 6 %    Screening Current   Cholesterol Screening Once every 5 years if you don't have a lipid disorder  May order more often based on risk factors  Lipid panel: 12/30/2019    Screening Current     Other Preventive Screenings Covered by Medicare:  1  Abdominal Aortic Aneurysm (AAA) Screening: covered once if your at risk  You're considered to be at risk if you have a family history of AAA  2  Lung Cancer Screening: covers low dose CT scan once per year if you meet all of the following conditions: (1) Age 50-69; (2) No signs or symptoms of lung cancer; (3) Current smoker or have quit smoking within the last 15 years; (4) You have a tobacco smoking history of at least 30 pack years (packs per day multiplied by number of years you smoked); (5) You get a written order from a healthcare provider    3  Glaucoma Screening: covered annually if you're considered high risk: (1) You have diabetes OR (2) Family history of glaucoma OR (3)  aged 48 and older OR (3)  American aged 72 and older  4  Osteoporosis Screening: covered every 2 years if you meet one of the following conditions: (1) You're estrogen deficient and at risk for osteoporosis based off medical history and other findings; (2) Have a vertebral abnormality; (3) On glucocorticoid therapy for more than 3 months; (4) Have primary hyperparathyroidism; (5) On osteoporosis medications and need to assess response to drug therapy  · Last bone density test (DXA Scan): Not on file  5  HIV Screening: covered annually if you're between the age of 12-76  Also covered annually if you are younger than 13 and older than 72 with risk factors for HIV infection  For pregnant patients, it is covered up to 3 times per pregnancy  Immunizations:  Immunization Recommendations   Influenza Vaccine Annual influenza vaccination during flu season is recommended for all persons aged >= 6 months who do not have contraindications   Pneumococcal Vaccine (Prevnar and Pneumovax)  * Prevnar = PCV13  * Pneumovax = PPSV23   Adults 25-60 years old: 1-3 doses may be recommended based on certain risk factors  Adults 72 years old: Prevnar (PCV13) vaccine recommended followed by Pneumovax (PPSV23) vaccine  If already received PPSV23 since turning 65, then PCV13 recommended at least one year after PPSV23 dose  Hepatitis B Vaccine 3 dose series if at intermediate or high risk (ex: diabetes, end stage renal disease, liver disease)   Tetanus (Td) Vaccine - COST NOT COVERED BY MEDICARE PART B Following completion of primary series, a booster dose should be given every 10 years to maintain immunity against tetanus  Td may also be given as tetanus wound prophylaxis  Tdap Vaccine - COST NOT COVERED BY MEDICARE PART B Recommended at least once for all adults  For pregnant patients, recommended with each pregnancy     Shingles Vaccine (Shingrix) - COST NOT COVERED BY MEDICARE PART B  2 shot series recommended in those aged 48 and above     Health Maintenance Due:      Topic Date Due    MAMMOGRAM  05/29/2021    Cervical Cancer Screening  11/23/2023    Colorectal Cancer Screening  04/23/2024    Hepatitis C Screening  Addressed     Immunizations Due:  There are no preventive care reminders to display for this patient  Advance Directives   What are advance directives? Advance directives are legal documents that state your wishes and plans for medical care  These plans are made ahead of time in case you lose your ability to make decisions for yourself  Advance directives can apply to any medical decision, such as the treatments you want, and if you want to donate organs  What are the types of advance directives? There are many types of advance directives, and each state has rules about how to use them  You may choose a combination of any of the following:  · Living will: This is a written record of the treatment you want  You can also choose which treatments you do not want, which to limit, and which to stop at a certain time  This includes surgery, medicine, IV fluid, and tube feedings  · Durable power of  for healthcare [facility]): This is a written record that states who you want to make healthcare choices for you when you are unable to make them for yourself  This person, called a proxy, is usually a family member or a friend  You may choose more than 1 proxy  · Do not resuscitate (DNR) order:  A DNR order is used in case your heart stops beating or you stop breathing  It is a request not to have certain forms of treatment, such as CPR  A DNR order may be included in other types of advance directives  · Medical directive: This covers the care that you want if you are in a coma, near death, or unable to make decisions for yourself  You can list the treatments you want for each condition  Treatment may include pain medicine, surgery, blood transfusions, dialysis, IV or tube feedings, and a ventilator (breathing machine)  · Values history:   This document has questions about your views, beliefs, and how you feel and think about life  This information can help others choose the care that you would choose  Why are advance directives important? An advance directive helps you control your care  Although spoken wishes may be used, it is better to have your wishes written down  Spoken wishes can be misunderstood, or not followed  Treatments may be given even if you do not want them  An advance directive may make it easier for your family to make difficult choices about your care  Urinary Incontinence   Urinary incontinence (UI)  is when you lose control of your bladder  UI develops because your bladder cannot store or empty urine properly  The 3 most common types of UI are stress incontinence, urge incontinence, or both  Medicines:   · May be given to help strengthen your bladder control  Report any side effects of medication to your healthcare provider  Do pelvic muscle exercises often:  Your pelvic muscles help you stop urinating  Squeeze these muscles tight for 5 seconds, then relax for 5 seconds  Gradually work up to squeezing for 10 seconds  Do 3 sets of 15 repetitions a day, or as directed  This will help strengthen your pelvic muscles and improve bladder control  Train your bladder:  Go to the bathroom at set times, such as every 2 hours, even if you do not feel the urge to go  You can also try to hold your urine when you feel the urge to go  For example, hold your urine for 5 minutes when you feel the urge to go  As that becomes easier, hold your urine for 10 minutes  Self-care:   · Keep a UI record  Write down how often you leak urine and how much you leak  Make a note of what you were doing when you leaked urine  · Drink liquids as directed  You may need to limit the amount of liquid you drink to help control your urine leakage  Do not drink any liquid right before you go to bed  Limit or do not have drinks that contain caffeine or alcohol  · Prevent constipation  Eat a variety of high-fiber foods  Good examples are high-fiber cereals, beans, vegetables, and whole-grain breads  Walking is the best way to trigger your intestines to have a bowel movement  · Exercise regularly and maintain a healthy weight  Weight loss and exercise will decrease pressure on your bladder and help you control your leakage  · Use a catheter as directed  to help empty your bladder  A catheter is a tiny, plastic tube that is put into your bladder to drain your urine  · Go to behavior therapy as directed  Behavior therapy may be used to help you learn to control your urge to urinate  Weight Management   Why it is important to manage your weight:  Being overweight increases your risk of health conditions such as heart disease, high blood pressure, type 2 diabetes, and certain types of cancer  It can also increase your risk for osteoarthritis, sleep apnea, and other respiratory problems  Aim for a slow, steady weight loss  Even a small amount of weight loss can lower your risk of health problems  How to lose weight safely:  A safe and healthy way to lose weight is to eat fewer calories and get regular exercise  You can lose up about 1 pound a week by decreasing the number of calories you eat by 500 calories each day  Healthy meal plan for weight management:  A healthy meal plan includes a variety of foods, contains fewer calories, and helps you stay healthy  A healthy meal plan includes the following:  · Eat whole-grain foods more often  A healthy meal plan should contain fiber  Fiber is the part of grains, fruits, and vegetables that is not broken down by your body  Whole-grain foods are healthy and provide extra fiber in your diet  Some examples of whole-grain foods are whole-wheat breads and pastas, oatmeal, brown rice, and bulgur  · Eat a variety of vegetables every day  Include dark, leafy greens such as spinach, kale, laura greens, and mustard greens   Eat yellow and orange vegetables such as carrots, sweet potatoes, and winter squash  · Eat a variety of fruits every day  Choose fresh or canned fruit (canned in its own juice or light syrup) instead of juice  Fruit juice has very little or no fiber  · Eat low-fat dairy foods  Drink fat-free (skim) milk or 1% milk  Eat fat-free yogurt and low-fat cottage cheese  Try low-fat cheeses such as mozzarella and other reduced-fat cheeses  · Choose meat and other protein foods that are low in fat  Choose beans or other legumes such as split peas or lentils  Choose fish, skinless poultry (chicken or turkey), or lean cuts of red meat (beef or pork)  Before you cook meat or poultry, cut off any visible fat  · Use less fat and oil  Try baking foods instead of frying them  Add less fat, such as margarine, sour cream, regular salad dressing and mayonnaise to foods  Eat fewer high-fat foods  Some examples of high-fat foods include french fries, doughnuts, ice cream, and cakes  · Eat fewer sweets  Limit foods and drinks that are high in sugar  This includes candy, cookies, regular soda, and sweetened drinks  Exercise:  Exercise at least 30 minutes per day on most days of the week  Some examples of exercise include walking, biking, dancing, and swimming  You can also fit in more physical activity by taking the stairs instead of the elevator or parking farther away from stores  Ask your healthcare provider about the best exercise plan for you  © Copyright [employer] 2018 Information is for End User's use only and may not be sold, redistributed or otherwise used for commercial purposes  All illustrations and images included in CareNotes® are the copyrighted property of A D [employer] M [employer] Inc  or [facility] [employer]         No follow-ups on file  Subjective:      Patient ID: Manjit Cantu is a 78 y o  female      Chief Complaint   Patient presents with    Medicare Wellness Visit    Neck Pain     on going problem    Rt foot tingling    Mouth sores since been on Prednisone    Medication Refill       PATIENT RETURNS FOR ANNUAL WELLNESS EXAM AND ANNUAL EVALUATION OF PATIENT'S MEDICAL ISSUES    INDIVIDUAL MEDICAL ISSUES WITH THEIR CURRENT STATUS, ASSESSMENT AND PLANS ARE LISTED ABOVE          The following portions of the patient's history were reviewed and updated as appropriate: allergies, current medications, past family history, past medical history, past social history, past surgical history and problem list     Review of Systems   Constitutional: Negative for chills, fatigue and fever  HENT: Negative for congestion, ear discharge, ear pain, mouth sores, postnasal drip, sore throat and trouble swallowing  Eyes: Negative for pain, discharge and visual disturbance  Respiratory: Negative for cough, shortness of breath and wheezing  Cardiovascular: Negative for chest pain, palpitations and leg swelling  Gastrointestinal: Negative for abdominal distention, abdominal pain, blood in stool, diarrhea and nausea  Endocrine: Negative for polydipsia, polyphagia and polyuria  Genitourinary: Negative for dysuria, frequency, hematuria and urgency  Musculoskeletal: Positive for arthralgias and neck pain  Negative for gait problem and joint swelling  Skin: Negative for pallor and rash  Neurological: Positive for numbness  Negative for dizziness, syncope, speech difficulty, weakness, light-headedness and headaches  Hematological: Negative for adenopathy  Psychiatric/Behavioral: Negative for behavioral problems, confusion and sleep disturbance  The patient is not nervous/anxious            Current Outpatient Medications   Medication Sig Dispense Refill    Cholecalciferol 2000 units CAPS Take 1 capsule by mouth      EPINEPHrine (EPIPEN) 0 3 mg/0 3 mL SOAJ Inject 0 3 mL (0 3 mg total) into a muscle once for 1 dose 0 6 mL 0    hydrochlorothiazide (HYDRODIURIL) 12 5 mg tablet Take 1 tablet (12 5 mg total) by mouth daily 90 tablet 1    losartan (COZAAR) 100 MG tablet Take 1 tablet (100 mg total) by mouth daily 90 tablet 1    Methylcobalamin (B12-ACTIVE PO) Take by mouth      Multiple Vitamins-Minerals (OCUVITE ADULT FORMULA PO) Take by mouth daily      Naproxen Sodium (Aleve) 220 MG CAPS Take by mouth daily as needed      pantoprazole (PROTONIX) 40 mg tablet Take 1 tablet by mouth Daily      PREMARIN vaginal cream Insert into the vagina 2 (two) times a week       HYDROcodone-acetaminophen (NORCO) 5-325 mg per tablet Take 1 tablet by mouth every 6 (six) hours as needed for painMax Daily Amount: 4 tablets 20 tablet 0    predniSONE 20 mg tablet TAKE 3 TABS PO QD X 4 DAYS THEN 2 TABS PO QD X 4 DAYS THEN 1 TAB PO QD X 4 DAYS THEN 1/2 TAB PO QD X YDAYS (Patient not taking: Reported on 1/8/2021) 26 tablet 0     No current facility-administered medications for this visit  Objective:    /60   Pulse (!) 54   Temp (!) 96 4 °F (35 8 °C) (Temporal)   Resp 16   Ht 5' 2 5" (1 588 m)   Wt 69 7 kg (153 lb 9 6 oz)   SpO2 98%   BMI 27 65 kg/m²        Physical Exam  Vitals signs reviewed  Constitutional:       Appearance: She is well-developed  HENT:      Head: Normocephalic and atraumatic  Right Ear: External ear normal       Left Ear: External ear normal       Nose: Nose normal    Eyes:      General:         Right eye: No discharge  Left eye: No discharge  Conjunctiva/sclera: Conjunctivae normal       Pupils: Pupils are equal, round, and reactive to light  Neck:      Musculoskeletal: Normal range of motion and neck supple  Thyroid: No thyromegaly  Cardiovascular:      Rate and Rhythm: Normal rate and regular rhythm  Heart sounds: Normal heart sounds  No murmur  Pulmonary:      Effort: Pulmonary effort is normal       Breath sounds: Normal breath sounds  No wheezing or rales  Abdominal:      General: Bowel sounds are normal  There is no distension  Palpations: Abdomen is soft  There is no mass  Tenderness: There is no abdominal tenderness  There is no guarding or rebound  Musculoskeletal: Normal range of motion  General: No tenderness or deformity  Lymphadenopathy:      Cervical: No cervical adenopathy  Skin:     General: Skin is warm and dry  Findings: No erythema or rash  Neurological:      Mental Status: She is alert and oriented to person, place, and time  Cranial Nerves: No cranial nerve deficit  Motor: No abnormal muscle tone  Coordination: Coordination normal       Deep Tendon Reflexes: Reflexes are normal and symmetric  Psychiatric:         Behavior: Behavior normal          Thought Content: Thought content normal          Judgment: Judgment normal                 Yanni Brice MD    Answers for HPI/ROS submitted by the patient on 1/1/2021   How would you rate your overall health?: good  Compared to last year, how is your physical health?: slightly worse  Compared to last year, how is your eyesight?: same  Compared to last year, how is your hearing?: same  Compared to last year, how is your emotional/mental health?: same  In the past 7 days, how much pain have you experienced?: a lot  If you answered "some" or "a lot", please rate the severity of your pain on a scale of 1 to 10 (1 being the least severe pain and 10 being the most intense pain)  : 7/10  In the past 6 months, have you lost or gained 10 pounds without trying?: No  Additional Comments: Experienced pain stemming from cervical spine , pain peaked on 12/25 which I withdraw rate a 7  It has since lessened quite a bit in its intensity  Aleve , rest, and heat has been effective  One or more falls in the last year: No  In the past 6 months, have you accidentally leaked urine?: Yes  Additional Comments: Sporadic tiny leaks  Not frequent    Do you have trouble with the stairs inside or outside your home?: No  Does your home have working smoke alarms?: Yes  Does your home have a carbon monoxide monitor?: Yes  Which safety hazards (if any) have you experienced in your home? Please select all that apply : poor household lighting  How would you describe your current diet? Please select all that apply : Regular, Limited junk food  Additional Comments: I limit salt intake  In addition to prescription medications, are you taking any over-the-counter supplements?: Yes  If yes, what supplements are you taking?: Aleve, vitamin D , B, Ocuvite  Can you manage your medications?: Yes  Can you walk and transfer into and out of your bed and chair?: Yes  Can you dress and groom yourself?: Yes  Can you bathe or shower yourself?: Yes  Can you feed yourself?: Yes  Can you do your laundry/ housekeeping?: Yes  Can you manage your money, pay your bills, and track your expenses?: Yes  Can you make your own meals?: Yes  Can you do your own shopping?: Yes  Within the last 12 months, have you had any hospitalizations or Emergency Department visits?: Yes  If yes, how many times have you been hospitalized within the past year?: 1-2  Additional Comments: Went to ER for allergic reaction    Do you have a living will?: Yes  Do you have a Durable POA (Power of ) for healthcare decisions?: No  Do you have an Advanced Directive for end of life decisions?: Yes

## 2021-01-09 LAB
ALBUMIN SERPL-MCNC: 4.4 G/DL (ref 3.8–4.8)
ALBUMIN/GLOB SERPL: 2.2 {RATIO} (ref 1.2–2.2)
ALP SERPL-CCNC: 65 IU/L (ref 39–117)
ALT SERPL-CCNC: 14 IU/L (ref 0–32)
AST SERPL-CCNC: 22 IU/L (ref 0–40)
BASOPHILS # BLD AUTO: 0 X10E3/UL (ref 0–0.2)
BASOPHILS NFR BLD AUTO: 0 %
BILIRUB SERPL-MCNC: 0.3 MG/DL (ref 0–1.2)
BUN SERPL-MCNC: 19 MG/DL (ref 8–27)
BUN/CREAT SERPL: 31 (ref 12–28)
CALCIUM SERPL-MCNC: 10.1 MG/DL (ref 8.7–10.3)
CHLORIDE SERPL-SCNC: 103 MMOL/L (ref 96–106)
CHOLEST SERPL-MCNC: 227 MG/DL (ref 100–199)
CHOLEST/HDLC SERPL: 1.9 RATIO (ref 0–4.4)
CO2 SERPL-SCNC: 27 MMOL/L (ref 20–29)
CREAT SERPL-MCNC: 0.61 MG/DL (ref 0.57–1)
CRP SERPL-MCNC: 6 MG/L (ref 0–10)
EOSINOPHIL # BLD AUTO: 0.1 X10E3/UL (ref 0–0.4)
EOSINOPHIL NFR BLD AUTO: 1 %
ERYTHROCYTE [DISTWIDTH] IN BLOOD BY AUTOMATED COUNT: 14.2 % (ref 11.7–15.4)
ERYTHROCYTE [SEDIMENTATION RATE] IN BLOOD BY WESTERGREN METHOD: 23 MM/HR (ref 0–40)
GLOBULIN SER-MCNC: 2 G/DL (ref 1.5–4.5)
GLUCOSE SERPL-MCNC: 89 MG/DL (ref 65–99)
HCT VFR BLD AUTO: 40 % (ref 34–46.6)
HDLC SERPL-MCNC: 120 MG/DL
HGB BLD-MCNC: 12.8 G/DL (ref 11.1–15.9)
IMM GRANULOCYTES # BLD: 0 X10E3/UL (ref 0–0.1)
IMM GRANULOCYTES NFR BLD: 0 %
LDLC SERPL CALC-MCNC: 99 MG/DL (ref 0–99)
LYMPHOCYTES # BLD AUTO: 1.1 X10E3/UL (ref 0.7–3.1)
LYMPHOCYTES NFR BLD AUTO: 24 %
MCH RBC QN AUTO: 28.3 PG (ref 26.6–33)
MCHC RBC AUTO-ENTMCNC: 32 G/DL (ref 31.5–35.7)
MCV RBC AUTO: 89 FL (ref 79–97)
MONOCYTES # BLD AUTO: 0.6 X10E3/UL (ref 0.1–0.9)
MONOCYTES NFR BLD AUTO: 12 %
NEUTROPHILS # BLD AUTO: 3 X10E3/UL (ref 1.4–7)
NEUTROPHILS NFR BLD AUTO: 63 %
PLATELET # BLD AUTO: 264 X10E3/UL (ref 150–450)
POTASSIUM SERPL-SCNC: 4.4 MMOL/L (ref 3.5–5.2)
PROT SERPL-MCNC: 6.4 G/DL (ref 6–8.5)
RBC # BLD AUTO: 4.52 X10E6/UL (ref 3.77–5.28)
SL AMB EGFR AFRICAN AMERICAN: 108 ML/MIN/1.73
SL AMB EGFR NON AFRICAN AMERICAN: 93 ML/MIN/1.73
SL AMB VLDL CHOLESTEROL CALC: 8 MG/DL (ref 5–40)
SODIUM SERPL-SCNC: 142 MMOL/L (ref 134–144)
TRIGL SERPL-MCNC: 48 MG/DL (ref 0–149)
TSH SERPL DL<=0.005 MIU/L-ACNC: 1.69 UIU/ML (ref 0.45–4.5)
URATE SERPL-MCNC: 3.2 MG/DL (ref 3–7.2)
WBC # BLD AUTO: 4.8 X10E3/UL (ref 3.4–10.8)

## 2021-04-08 ENCOUNTER — RA CDI HCC (OUTPATIENT)
Dept: OTHER | Facility: HOSPITAL | Age: 71
End: 2021-04-08

## 2021-04-08 NOTE — PROGRESS NOTES
[facility] 77  coding oppertunities          Chart reviewed, no opportunity found: CHART REVIEWED, NO OPPORTUNITY FOUND                    HCC's from problem list reported on 1/8/21 no other HCC's found  I47 1, M42 02, C62 2

## 2021-04-15 ENCOUNTER — OFFICE VISIT (OUTPATIENT)
Dept: FAMILY MEDICINE CLINIC | Facility: CLINIC | Age: 71
End: 2021-04-15
Payer: COMMERCIAL

## 2021-04-15 VITALS
HEART RATE: 68 BPM | BODY MASS INDEX: 27.11 KG/M2 | OXYGEN SATURATION: 98 % | RESPIRATION RATE: 16 BRPM | HEIGHT: 63 IN | DIASTOLIC BLOOD PRESSURE: 66 MMHG | SYSTOLIC BLOOD PRESSURE: 134 MMHG | TEMPERATURE: 96.5 F | WEIGHT: 153 LBS

## 2021-04-15 DIAGNOSIS — G99.2 STENOSIS OF CERVICAL SPINE WITH MYELOPATHY (HCC): ICD-10-CM

## 2021-04-15 DIAGNOSIS — M19.90 ARTHRITIS: ICD-10-CM

## 2021-04-15 DIAGNOSIS — I10 ESSENTIAL HYPERTENSION: Primary | ICD-10-CM

## 2021-04-15 DIAGNOSIS — I48.0 PAROXYSMAL ATRIAL FIBRILLATION (HCC): ICD-10-CM

## 2021-04-15 DIAGNOSIS — M48.02 STENOSIS OF CERVICAL SPINE WITH MYELOPATHY (HCC): ICD-10-CM

## 2021-04-15 PROCEDURE — 1036F TOBACCO NON-USER: CPT | Performed by: FAMILY MEDICINE

## 2021-04-15 PROCEDURE — 1160F RVW MEDS BY RX/DR IN RCRD: CPT | Performed by: FAMILY MEDICINE

## 2021-04-15 PROCEDURE — 3078F DIAST BP <80 MM HG: CPT | Performed by: FAMILY MEDICINE

## 2021-04-15 PROCEDURE — 99214 OFFICE O/P EST MOD 30 MIN: CPT | Performed by: FAMILY MEDICINE

## 2021-04-15 PROCEDURE — 3008F BODY MASS INDEX DOCD: CPT | Performed by: FAMILY MEDICINE

## 2021-04-15 PROCEDURE — 3075F SYST BP GE 130 - 139MM HG: CPT | Performed by: FAMILY MEDICINE

## 2021-04-15 RX ORDER — OMEGA-3 FATTY ACIDS CAP DELAYED RELEASE 1000 MG 1000 MG
CAPSULE DELAYED RELEASE ORAL
COMMUNITY

## 2021-04-15 RX ORDER — APIXABAN 5 MG/1
5 TABLET, FILM COATED ORAL 2 TIMES DAILY
COMMUNITY
Start: 2021-04-05

## 2021-04-15 NOTE — PATIENT INSTRUCTIONS
CONTINUE CURRENT TREATMENT PLAN  MONITOR DIETARY SODIUM, CHOL, AND CARBOHYDRATE INTAKE  ENCOURAGE PHYSICAL ACTIVITY      RV 6M, SOONER PRN

## 2021-04-15 NOTE — PROGRESS NOTES
Assessment/Plan:    Hypertension  STABLE  DENIES ANY CP, SOB, PALPITATIONS, OR HEADACHE  NOTES NO WATER RETENTION  COMPLIANT WITH MEDICATION  NO CONCERNS    - CONTINUE CURRENT TREATMENT PLAN  - MONITOR DIETARY SODIUM INTAKE  - ENCOURAGE PHYSICAL ACTIVITY  - RV 6 MONTHS      Paroxysmal atrial fibrillation (HCC)  NEW DIAGNOSIS  FOLLOWED BY DR [id number]  STABLE ON ELIQUIS  WORK UP IN PROGRESS    Stenosis of cervical spine with myelopathy (HCC)  STABLE      Arthritis  STABLE  DENIES ANY JOINT SWELLING OR REDNESS  JOINT STIFFNESS PRESENT  PAIN MANAGEMENT ADEQUATE    - CONTINUE CURRENT MANAGEMENT  - MEDICATION AS DIRECTED  - CALL / RETURN IF SYMPTOMS WORSEN         Diagnoses and all orders for this visit:    Essential hypertension    Paroxysmal atrial fibrillation ([address] )    Stenosis of cervical spine with myelopathy (HCC)    Arthritis    Other orders  -     Eliquis 5 MG  -     Omega-3 Fatty Acids (Fish Oil) 1000 MG CPDR; Take by mouth          Patient Instructions   CONTINUE CURRENT TREATMENT PLAN  MONITOR DIETARY SODIUM, CHOL, AND CARBOHYDRATE INTAKE  ENCOURAGE PHYSICAL ACTIVITY      RV 6M, SOONER PRN        Return in about 6 months (around 10/15/2021) for Annual physical     Subjective:      Patient ID: Sage Keenan is a 78 y o  female  Chief Complaint   Patient presents with    Follow-up     [facility] lpn       PATIENT RETURNS FOR ROUTINE EVALUATION OF PATIENT'S MEDICAL ISSUES    INDIVIDUAL MEDICAL ISSUES WITH THEIR CURRENT STATUS, ASSESSMENT AND PLANS ARE LISTED ABOVE          The following portions of the patient's history were reviewed and updated as appropriate: allergies, current medications, past family history, past medical history, past social history, past surgical history and problem list     Review of Systems   Constitutional: Negative for chills, fatigue and fever  HENT: Negative for congestion, ear discharge, ear pain, mouth sores, postnasal drip, sore throat and trouble swallowing      Eyes: Negative for pain, discharge and visual disturbance  Respiratory: Negative for cough, shortness of breath and wheezing  Cardiovascular: Negative for chest pain, palpitations and leg swelling  Gastrointestinal: Negative for abdominal distention, abdominal pain, blood in stool, diarrhea and nausea  Endocrine: Negative for polydipsia, polyphagia and polyuria  Genitourinary: Negative for dysuria, frequency, hematuria and urgency  Musculoskeletal: Positive for arthralgias, neck pain and neck stiffness  Negative for gait problem and joint swelling  Skin: Negative for pallor and rash  Neurological: Negative for dizziness, syncope, speech difficulty, weakness, light-headedness, numbness and headaches  Hematological: Negative for adenopathy  Psychiatric/Behavioral: Negative for behavioral problems, confusion and sleep disturbance  The patient is nervous/anxious  Current Outpatient Medications   Medication Sig Dispense Refill    Cholecalciferol 2000 units CAPS Take 1 capsule by mouth      Eliquis 5 MG       EPINEPHrine (EPIPEN) 0 3 mg/0 3 mL SOAJ Inject 0 3 mL (0 3 mg total) into a muscle once for 1 dose 0 6 mL 0    losartan (COZAAR) 100 MG tablet Take 1 tablet (100 mg total) by mouth daily 90 tablet 1    Methylcobalamin (B12-ACTIVE PO) Take by mouth      Multiple Vitamins-Minerals (OCUVITE ADULT FORMULA PO) Take by mouth daily      Naproxen Sodium (Aleve) 220 MG CAPS Take by mouth daily as needed      Omega-3 Fatty Acids (Fish Oil) 1000 MG CPDR Take by mouth      pantoprazole (PROTONIX) 40 mg tablet Take 1 tablet by mouth Daily      PREMARIN vaginal cream Insert into the vagina 2 (two) times a week        No current facility-administered medications for this visit          Objective:    /66   Pulse 68   Temp (!) 96 5 °F (35 8 °C)   Resp 16   Ht 5' 2 5" (1 588 m)   Wt 69 4 kg (153 lb)   SpO2 98%   BMI 27 54 kg/m²        Physical Exam  Constitutional:       Appearance: She is well-developed  HENT:      Head: Normocephalic and atraumatic  Eyes:      General:         Right eye: No discharge  Left eye: No discharge  Conjunctiva/sclera: Conjunctivae normal       Pupils: Pupils are equal, round, and reactive to light  Neck:      Musculoskeletal: Normal range of motion and neck supple  Thyroid: No thyromegaly  Cardiovascular:      Rate and Rhythm: Normal rate and regular rhythm  Heart sounds: Normal heart sounds  No murmur  Pulmonary:      Effort: Pulmonary effort is normal  No respiratory distress  Breath sounds: Normal breath sounds  No wheezing or rales  Abdominal:      General: Bowel sounds are normal       Palpations: Abdomen is soft  Tenderness: There is no abdominal tenderness  Musculoskeletal:         General: No tenderness  Comments: MODERATE DJD CHANGES       Lymphadenopathy:      Cervical: No cervical adenopathy  Skin:     General: Skin is warm and dry  Findings: No erythema or rash  Neurological:      Mental Status: She is alert and oriented to person, place, and time  Psychiatric:         Behavior: Behavior normal          Thought Content:  Thought content normal          Judgment: Judgment normal                 Miguel Angel Menjivar MD

## 2021-07-09 DIAGNOSIS — I10 ESSENTIAL HYPERTENSION: ICD-10-CM

## 2021-07-09 RX ORDER — LOSARTAN POTASSIUM 100 MG/1
TABLET ORAL
Qty: 90 TABLET | Refills: 1 | Status: SHIPPED | OUTPATIENT
Start: 2021-07-09 | End: 2021-12-27

## 2021-08-17 ENCOUNTER — IMMUNIZATIONS (OUTPATIENT)
Dept: FAMILY MEDICINE CLINIC | Facility: HOSPITAL | Age: 71
End: 2021-08-17

## 2021-08-17 DIAGNOSIS — L50.9 URTICARIA: ICD-10-CM

## 2021-08-17 DIAGNOSIS — Z23 ENCOUNTER FOR IMMUNIZATION: Primary | ICD-10-CM

## 2021-08-17 PROCEDURE — 91301 SARS-COV-2 / COVID-19 MRNA VACCINE (MODERNA) 100 MCG: CPT

## 2021-08-17 PROCEDURE — 0011A SARS-COV-2 / COVID-19 MRNA VACCINE (MODERNA) 100 MCG: CPT

## 2021-08-17 RX ORDER — EPINEPHRINE 0.3 MG/.3ML
0.3 INJECTION SUBCUTANEOUS ONCE
Qty: 0.6 ML | Refills: 0 | Status: SHIPPED | OUTPATIENT
Start: 2021-08-17 | End: 2022-02-16

## 2021-09-14 ENCOUNTER — IMMUNIZATIONS (OUTPATIENT)
Dept: FAMILY MEDICINE CLINIC | Facility: HOSPITAL | Age: 71
End: 2021-09-14

## 2021-09-14 DIAGNOSIS — Z23 ENCOUNTER FOR IMMUNIZATION: Primary | ICD-10-CM

## 2021-09-14 PROCEDURE — 0012A SARS-COV-2 / COVID-19 MRNA VACCINE (MODERNA) 100 MCG: CPT

## 2021-09-14 PROCEDURE — 91301 SARS-COV-2 / COVID-19 MRNA VACCINE (MODERNA) 100 MCG: CPT

## 2021-10-11 RX ORDER — LORATADINE 10 MG/1
10 TABLET ORAL DAILY PRN
COMMUNITY

## 2021-10-14 ENCOUNTER — OFFICE VISIT (OUTPATIENT)
Dept: FAMILY MEDICINE CLINIC | Facility: CLINIC | Age: 71
End: 2021-10-14
Payer: COMMERCIAL

## 2021-10-14 ENCOUNTER — TELEPHONE (OUTPATIENT)
Dept: ADMINISTRATIVE | Facility: OTHER | Age: 71
End: 2021-10-14

## 2021-10-14 VITALS
DIASTOLIC BLOOD PRESSURE: 80 MMHG | TEMPERATURE: 98.3 F | HEIGHT: 63 IN | BODY MASS INDEX: 27.64 KG/M2 | HEART RATE: 68 BPM | SYSTOLIC BLOOD PRESSURE: 118 MMHG | RESPIRATION RATE: 12 BRPM | OXYGEN SATURATION: 97 % | WEIGHT: 156 LBS

## 2021-10-14 DIAGNOSIS — G99.2 STENOSIS OF CERVICAL SPINE WITH MYELOPATHY (HCC): ICD-10-CM

## 2021-10-14 DIAGNOSIS — M48.02 STENOSIS OF CERVICAL SPINE WITH MYELOPATHY (HCC): ICD-10-CM

## 2021-10-14 DIAGNOSIS — I10 PRIMARY HYPERTENSION: Primary | ICD-10-CM

## 2021-10-14 DIAGNOSIS — M19.90 ARTHRITIS: ICD-10-CM

## 2021-10-14 DIAGNOSIS — Z12.31 ENCOUNTER FOR SCREENING MAMMOGRAM FOR BREAST CANCER: ICD-10-CM

## 2021-10-14 DIAGNOSIS — Z23 NEED FOR INFLUENZA VACCINATION: ICD-10-CM

## 2021-10-14 DIAGNOSIS — I48.0 PAROXYSMAL ATRIAL FIBRILLATION (HCC): ICD-10-CM

## 2021-10-14 DIAGNOSIS — K21.9 GASTROESOPHAGEAL REFLUX DISEASE WITHOUT ESOPHAGITIS: ICD-10-CM

## 2021-10-14 PROBLEM — K12.0 APHTHOUS ULCER OF MOUTH: Status: RESOLVED | Noted: 2020-12-20 | Resolved: 2021-10-14

## 2021-10-14 PROBLEM — M25.473 ANKLE SWELLING: Status: RESOLVED | Noted: 2020-12-06 | Resolved: 2021-10-14

## 2021-10-14 PROBLEM — R42 VERTIGO: Status: RESOLVED | Noted: 2020-10-26 | Resolved: 2021-10-14

## 2021-10-14 PROBLEM — S13.9XXA NECK SPRAIN: Status: RESOLVED | Noted: 2019-10-08 | Resolved: 2021-10-14

## 2021-10-14 PROBLEM — M79.18 MYOFASCIAL MUSCLE PAIN: Status: ACTIVE | Noted: 2017-09-08

## 2021-10-14 PROCEDURE — 1101F PT FALLS ASSESS-DOCD LE1/YR: CPT | Performed by: FAMILY MEDICINE

## 2021-10-14 PROCEDURE — 3074F SYST BP LT 130 MM HG: CPT | Performed by: FAMILY MEDICINE

## 2021-10-14 PROCEDURE — 3288F FALL RISK ASSESSMENT DOCD: CPT | Performed by: FAMILY MEDICINE

## 2021-10-14 PROCEDURE — 99214 OFFICE O/P EST MOD 30 MIN: CPT | Performed by: FAMILY MEDICINE

## 2021-10-14 PROCEDURE — 3725F SCREEN DEPRESSION PERFORMED: CPT | Performed by: FAMILY MEDICINE

## 2021-10-14 PROCEDURE — 3079F DIAST BP 80-89 MM HG: CPT | Performed by: FAMILY MEDICINE

## 2021-10-14 PROCEDURE — G0008 ADMIN INFLUENZA VIRUS VAC: HCPCS

## 2021-10-14 PROCEDURE — 3008F BODY MASS INDEX DOCD: CPT | Performed by: FAMILY MEDICINE

## 2021-10-14 PROCEDURE — 90662 IIV NO PRSV INCREASED AG IM: CPT

## 2021-10-14 PROCEDURE — 1036F TOBACCO NON-USER: CPT | Performed by: FAMILY MEDICINE

## 2021-10-14 PROCEDURE — 1160F RVW MEDS BY RX/DR IN RCRD: CPT | Performed by: FAMILY MEDICINE

## 2021-11-22 ENCOUNTER — TELEMEDICINE (OUTPATIENT)
Dept: FAMILY MEDICINE CLINIC | Facility: CLINIC | Age: 71
End: 2021-11-22
Payer: COMMERCIAL

## 2021-11-22 VITALS — TEMPERATURE: 98 F | BODY MASS INDEX: 27.64 KG/M2 | HEIGHT: 63 IN | WEIGHT: 156 LBS

## 2021-11-22 DIAGNOSIS — R05.9 COUGH: ICD-10-CM

## 2021-11-22 DIAGNOSIS — R09.81 NASAL CONGESTION: Primary | ICD-10-CM

## 2021-11-22 PROCEDURE — 1036F TOBACCO NON-USER: CPT | Performed by: FAMILY MEDICINE

## 2021-11-22 PROCEDURE — 3008F BODY MASS INDEX DOCD: CPT | Performed by: FAMILY MEDICINE

## 2021-11-22 PROCEDURE — 1160F RVW MEDS BY RX/DR IN RCRD: CPT | Performed by: FAMILY MEDICINE

## 2021-11-22 PROCEDURE — 99213 OFFICE O/P EST LOW 20 MIN: CPT | Performed by: FAMILY MEDICINE

## 2021-11-22 RX ORDER — LEVOFLOXACIN 500 MG/1
500 TABLET, FILM COATED ORAL EVERY 24 HOURS
Qty: 10 TABLET | Refills: 0 | Status: SHIPPED | OUTPATIENT
Start: 2021-11-22 | End: 2021-12-02

## 2021-11-26 ENCOUNTER — TELEPHONE (OUTPATIENT)
Dept: FAMILY MEDICINE CLINIC | Facility: CLINIC | Age: 71
End: 2021-11-26

## 2021-12-27 DIAGNOSIS — I10 ESSENTIAL HYPERTENSION: ICD-10-CM

## 2021-12-27 RX ORDER — LOSARTAN POTASSIUM 100 MG/1
TABLET ORAL
Qty: 90 TABLET | Refills: 1 | Status: SHIPPED | OUTPATIENT
Start: 2021-12-27 | End: 2022-06-16

## 2022-02-16 ENCOUNTER — OFFICE VISIT (OUTPATIENT)
Dept: FAMILY MEDICINE CLINIC | Facility: CLINIC | Age: 72
End: 2022-02-16
Payer: COMMERCIAL

## 2022-02-16 VITALS
SYSTOLIC BLOOD PRESSURE: 118 MMHG | WEIGHT: 159 LBS | HEIGHT: 63 IN | TEMPERATURE: 97 F | BODY MASS INDEX: 28.17 KG/M2 | RESPIRATION RATE: 16 BRPM | HEART RATE: 52 BPM | DIASTOLIC BLOOD PRESSURE: 76 MMHG | OXYGEN SATURATION: 99 %

## 2022-02-16 DIAGNOSIS — Z13.820 SCREENING FOR OSTEOPOROSIS: ICD-10-CM

## 2022-02-16 DIAGNOSIS — Z78.0 ASYMPTOMATIC MENOPAUSE: ICD-10-CM

## 2022-02-16 DIAGNOSIS — M48.062 SPINAL STENOSIS OF LUMBAR REGION WITH NEUROGENIC CLAUDICATION: ICD-10-CM

## 2022-02-16 DIAGNOSIS — Z00.00 MEDICARE ANNUAL WELLNESS VISIT, SUBSEQUENT: ICD-10-CM

## 2022-02-16 DIAGNOSIS — Z12.31 ENCOUNTER FOR SCREENING MAMMOGRAM FOR BREAST CANCER: ICD-10-CM

## 2022-02-16 DIAGNOSIS — M19.90 ARTHRITIS: ICD-10-CM

## 2022-02-16 DIAGNOSIS — Z13.220 SCREENING FOR LIPID DISORDERS: ICD-10-CM

## 2022-02-16 DIAGNOSIS — I10 PRIMARY HYPERTENSION: ICD-10-CM

## 2022-02-16 DIAGNOSIS — K21.9 GASTROESOPHAGEAL REFLUX DISEASE WITHOUT ESOPHAGITIS: ICD-10-CM

## 2022-02-16 DIAGNOSIS — I48.0 PAROXYSMAL ATRIAL FIBRILLATION (HCC): Primary | ICD-10-CM

## 2022-02-16 DIAGNOSIS — E04.1 THYROID NODULE: ICD-10-CM

## 2022-02-16 PROBLEM — R05.9 COUGH: Status: RESOLVED | Noted: 2021-11-22 | Resolved: 2022-02-16

## 2022-02-16 PROBLEM — R09.81 NASAL CONGESTION: Status: RESOLVED | Noted: 2021-11-22 | Resolved: 2022-02-16

## 2022-02-16 PROCEDURE — 1090F PRES/ABSN URINE INCON ASSESS: CPT | Performed by: FAMILY MEDICINE

## 2022-02-16 PROCEDURE — 99215 OFFICE O/P EST HI 40 MIN: CPT | Performed by: FAMILY MEDICINE

## 2022-02-16 PROCEDURE — 93000 ELECTROCARDIOGRAM COMPLETE: CPT | Performed by: FAMILY MEDICINE

## 2022-02-16 PROCEDURE — 1170F FXNL STATUS ASSESSED: CPT | Performed by: FAMILY MEDICINE

## 2022-02-16 PROCEDURE — G0439 PPPS, SUBSEQ VISIT: HCPCS | Performed by: FAMILY MEDICINE

## 2022-02-16 PROCEDURE — 1125F AMNT PAIN NOTED PAIN PRSNT: CPT | Performed by: FAMILY MEDICINE

## 2022-02-16 PROCEDURE — 3288F FALL RISK ASSESSMENT DOCD: CPT | Performed by: FAMILY MEDICINE

## 2022-02-16 PROCEDURE — 1003F LEVEL OF ACTIVITY ASSESS: CPT | Performed by: FAMILY MEDICINE

## 2022-02-16 NOTE — PROGRESS NOTES
Assessment and Plan:     Problem List Items Addressed This Visit        Digestive    Gastroesophageal reflux disease without esophagitis     STABLE  DENIES ANY CP, INDIGESTION, OR COUGH  NOTES NO MELENA OR HEMATOCHEZIA  NO HEMATEMESIS  COMPLIANT WITH MEDICATION  NO CONCERNS    - CONTINUE CURRENT TREATMENT PLAN  - MEDICATION AS PRESCRIBED  - AVOID CAFFEINE, ALCOHOL OR SMOKING           Relevant Orders    CBC and differential       Endocrine    Thyroid nodule    Relevant Orders    TSH, 3rd generation    US thyroid       Cardiovascular and Mediastinum    Hypertension     STABLE  DENIES ANY CP, SOB, PALPITATIONS, OR HEADACHE  NOTES NO WATER RETENTION  COMPLIANT WITH MEDICATION  NO CONCERNS    - CONTINUE CURRENT TREATMENT PLAN  - MONITOR DIETARY SODIUM INTAKE  - ENCOURAGE PHYSICAL ACTIVITY  - RV 6 MONTHS           Relevant Orders    Comprehensive metabolic panel    Lipid panel    Uric acid    Paroxysmal atrial fibrillation (HCC) - Primary     STABLE  FOLLOWED BY CARDIOLOGY           Relevant Orders    TSH, 3rd generation    Lipid panel    POCT ECG       Musculoskeletal and Integument    Arthritis     STABLE  DENIES ANY JOINT SWELLING OR REDNESS  JOINT STIFFNESS PRESENT  PAIN MANAGEMENT ADEQUATE    - CONTINUE CURRENT MANAGEMENT  - MEDICATION AS DIRECTED  - CALL / RETURN IF SYMPTOMS WORSEN           Relevant Orders    Sedimentation rate, automated    C-reactive protein       Other    Spinal stenosis of lumbar region with neurogenic claudication    Medicare annual wellness visit, subsequent    Encounter for screening mammogram for breast cancer    Relevant Orders    Mammo screening bilateral w cad      Other Visit Diagnoses     Asymptomatic menopause        Relevant Orders    DXA bone density spine hip and pelvis    Screening for osteoporosis        Relevant Orders    DXA bone density spine hip and pelvis    Screening for lipid disorders        Relevant Orders    Lipid panel           Preventive health issues were discussed with patient, and age appropriate screening tests were ordered as noted in patient's After Visit Summary  Personalized health advice and appropriate referrals for health education or preventive services given if needed, as noted in patient's After Visit Summary       History of Present Illness:     Patient presents for Medicare Annual Wellness visit    Patient Care Team:  Randi Boss MD as PCP - General (Family Medicine)  Vincent Johansen MD (Gynecology)  Yamini Morton MD (Ophthalmology)  Carlton García (Cardiology)  Alex Moody MD (Gastroenterology)  Iona Coles,  (Dermatology)  Hesham Garduno MD (Neurosurgery)  Diana Gaytan DPM (Podiatry)     Problem List:     Patient Active Problem List   Diagnosis    Thyroid nodule    Dysphagia    Arthritis    Spinal stenosis of lumbar region with neurogenic claudication    Enthesopathy of lower extremity    Hypertension    Osteoarthritis of cervical spine    Osteoarthritis of left knee    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    Gastroesophageal reflux disease without esophagitis    Stenosis of cervical spine with myelopathy (HCC)    SVT (supraventricular tachycardia) (HCC)    Numbness and tingling of foot    Medicare annual wellness visit, subsequent    Paroxysmal atrial fibrillation ([address] )    Myofascial muscle pain    Encounter for screening mammogram for breast cancer    Need for influenza vaccination      Past Medical and Surgical History:     Past Medical History:   Diagnosis Date    Arthritis     Enthesopathy of lower extremity     last assessed 08/21/2015    GERD (gastroesophageal reflux disease)     High blood pressure     History of basal cell cancer     Ocular migraine     [facility]supra[facility]ricular [facility])      Past Surgical History:   Procedure Laterality Date    CERVICAL DISCECTOMY  2018    [address]  [address]    MOHS SURGERY  2017    Basal Cell, Face-Heath Family History:     Family History   Problem Relation Age of Onset    Arthritis Mother    Jacqueline Childs Cancer Mother     Esophageal cancer Mother     Lung cancer Father     Cancer Father     Hypertension Father     Stroke Maternal Grandmother     Heart disease Maternal Grandfather     Substance Abuse Neg Hx     Mental illness Neg Hx       Social History:     Social History     Socioeconomic History    Marital status:      Spouse name: None    Number of children: None    Years of education: None    Highest education level: None   Occupational History    None   Tobacco Use    Smoking status: Never Smoker    Smokeless tobacco: Never Used   Vaping Use    Vaping Use: Never used   Substance and Sexual Activity    Alcohol use: Yes     Comment: occ    Drug use: No    Sexual activity: None   Other Topics Concern    None   Social History Narrative    Active advance directive    Caffeine use    Dental care regularly    Drinks coffee     Social Determinants of Health     Financial Resource Strain: Not on file   Food Insecurity: Not on file   Transportation Needs: Not on file   Physical Activity: Not on file   Stress: Not on file   Social Connections: Not on file   Intimate Partner Violence: Not on file   Housing Stability: Not on file      Medications and Allergies:     Current Outpatient Medications   Medication Sig Dispense Refill    Cholecalciferol 2000 units CAPS Take 1 capsule by mouth      Eliquis 5 MG 5 mg 2 (two) times a day       EPINEPHrine (EPIPEN) 0 3 mg/0 3 mL SOAJ Inject 0 3 mL (0 3 mg total) into a muscle once for 1 dose 0 6 mL 0    loratadine (CLARITIN) 10 mg tablet Take 10 mg by mouth daily as needed        losartan (COZAAR) 100 MG tablet TAKE ONE TABLET BY MOUTH EVERY DAY 90 tablet 1    Multiple Vitamins-Minerals (OCUVITE ADULT FORMULA PO) Take by mouth daily      Naproxen Sodium (Aleve) 220 MG CAPS Take by mouth daily as needed      Omega-3 Fatty Acids (Fish Oil) 1000 MG CPDR Take by mouth      pantoprazole (PROTONIX) 40 mg tablet Take 1 tablet by mouth Daily      PREMARIN vaginal cream Insert into the vagina 2 (two) times a week        No current facility-administered medications for this visit  Allergies   Allergen Reactions    Amoxicillin GI Intolerance     Other reaction(s): Nausea and/or vomiting  Other reaction(s): Nausea and/or vomiting    Amoxicillin-Pot Clavulanate GI Intolerance     Other reaction(s): Nausea and/or vomiting  AUGMENTIN    Erythromycin GI Intolerance     Other reaction(s): Nausea and/or vomiting  Annotation - [id number]: vomiting  Other reaction(s): Nausea and/or vomiting  Annotation - [id number]: vomiting    Sunflower Oil - Food Allergy Hives and Lip Swelling     And sunflower seeds      Immunizations:     Immunization History   Administered Date(s) Administered    COVID-19 MODERNA VACC 0 5 ML IM 08/17/2021, 09/14/2021    Influenza Split High Dose Preservative Free IM 10/04/2017    Influenza, high dose seasonal 0 7 mL 09/19/2018, 10/24/2019, 09/17/2020, 10/14/2021    Influenza, seasonal, injectable 09/01/2016    Pneumococcal Conjugate 13-Valent 11/22/2017    Pneumococcal Polysaccharide PPV23 10/02/2020    TD (adult) Preservative Free 03/08/2015      Health Maintenance:         Topic Date Due    Breast Cancer Screening: Mammogram  06/09/2022    Cervical Cancer Screening  11/23/2023    Colorectal Cancer Screening  04/23/2024    Hepatitis C Screening  Addressed         Topic Date Due    COVID-19 Vaccine (3 - Booster for Moderna series) 02/14/2022      Medicare Health Risk Assessment:     /76   Pulse (!) 52   Temp (!) 97 °F (36 1 °C) (Temporal)   Resp 16   Ht 5' 2 5" (1 588 m)   Wt 72 1 kg (159 lb)   SpO2 99%   BMI 28 62 kg/m²      Luis M Florentino is here for her Subsequent Wellness visit  Health Risk Assessment:   Patient rates overall health as good  Patient feels that their physical health rating is same  Patient is satisfied with their life  Eyesight was rated as same  Hearing was rated as same  Patient feels that their emotional and mental health rating is same  Patients states they are never, rarely angry  Patient states they are sometimes unusually tired/fatigued  Pain experienced in the last 7 days has been some  Patient's pain rating has been 5/10  Patient states that she has experienced no weight loss or gain in last 6 months  Fall Risk Screening: In the past year, patient has experienced: no history of falling in past year      Urinary Incontinence Screening:   Patient has leaked urine accidently in the last six months  Usually when bladder is full and I sneeze  Home Safety:  Patient does not have trouble with stairs inside or outside of their home  Patient has working smoke alarms and has working carbon monoxide detector  Home safety hazards include: poor household lighting and not having non-slip bath and/or shower mats  Nutrition:   Current diet is Regular  Medications:   Patient is currently taking over-the-counter supplements  OTC medications include: Vitamin D, Ocuvite, Fish oil  Patient is able to manage medications  Activities of Daily Living (ADLs)/Instrumental Activities of Daily Living (IADLs):   Walk and transfer into and out of bed and chair?: Yes  Dress and groom yourself?: Yes    Bathe or shower yourself?: Yes    Feed yourself?  Yes  Do your laundry/housekeeping?: Yes  Manage your money, pay your bills and track your expenses?: Yes  Make your own meals?: Yes    Do your own shopping?: Yes    Previous Hospitalizations:   Any hospitalizations or ED visits within the last 12 months?: No      Advance Care Planning:   Living will: Yes    Durable POA for healthcare: No    Advanced directive: Yes    Provider agrees with end of life decisions: Yes      Cognitive Screening:   Provider or family/friend/caregiver concerned regarding cognition?: No    PREVENTIVE SCREENINGS      Cardiovascular Screening:    General: Screening Current      Diabetes Screening:     General: Screening Current      Colorectal Cancer Screening:     General: Screening Current      Breast Cancer Screening:     General: Screening Current    Due for: Mammogram        Cervical Cancer Screening:    General: Screening Not Indicated      Osteoporosis Screening:    General: Screening Current    Due for: DXA Axial      Abdominal Aortic Aneurysm (AAA) Screening:        General: Screening Not Indicated      Lung Cancer Screening:     General: Screening Not Indicated      Hepatitis C Screening:    General: Screening Current    Screening, Brief Intervention, and Referral to Treatment (SBIRT)    Screening  Typical number of drinks in a day: 1  Typical number of drinks in a week: 5  Interpretation: Low risk drinking behavior  AUDIT-C Screenin) How often did you have a drink containing alcohol in the past year? 2 to 3 times a week  2) How many drinks did you have on a typical day when you were drinking in the past year? 1 to 2  3) How often did you have 6 or more drinks on one occasion in the past year? never    AUDIT-C Score: 3  Interpretation: Score 3-12 (female): POSITIVE screen for alcohol misuse    AUDIT Screenin) How often during the last year have you found that you were not able to stop drinking once you had started? 0 - never  5) How often during the last year have you failed to do what was normally expected from you because of drinking? 0 - never  6) How often during the last year have you needed a first drink in the morning to get yourself going after a heavy drinking session?  0 - never  7) How often during the last year have you had a feeling of guilt or remorse after drinking? 0 - never  8) How often during the last year have you been unable to remember what happened the night before because you had been drinking? 0 - never  9) Have you or someone else been injured as a result of your drinking? 0 - no  10) Has a relative or friend or a doctor or another health worker been concerned about your drinking or suggested you cut down? 0 - no    AUDIT Score: 3  Interpretation: Low risk alcohol consumption    Single Item Drug Screening:  How often have you used an illegal drug (including marijuana) or a prescription medication for non-medical reasons in the past year? never    Single Item Drug Screen Score: 0  Interpretation: Negative screen for possible drug use disorder      Ashley Pardo MD

## 2022-02-16 NOTE — PATIENT INSTRUCTIONS
DISCUSSED HEALTH ISSUES  HEALTHY DIET AND EXERCISE  BW WILL BE OBTAINED  MAMMOGRAPHY   RECOMMEND CALCIUM 6228-2394 MG DAILY  VITAMIN D3  1000 IU DAILY  RV IN 1 YEAR FOR ANNUAL EXAM, SOONER IF NEEDED      RV 6M      Medicare Preventive Visit Patient Instructions  Thank you for completing your Welcome to Medicare Visit or Medicare Annual Wellness Visit today  Your next wellness visit will be due in one year (2/17/2023)  The screening/preventive services that you may require over the next 5-10 years are detailed below  Some tests may not apply to you based off risk factors and/or age  Screening tests ordered at today's visit but not completed yet may show as past due  Also, please note that scanned in results may not display below  Preventive Screenings:  Service Recommendations Previous Testing/Comments   Colorectal Cancer Screening  * Colonoscopy    * Fecal Occult Blood Test (FOBT)/Fecal Immunochemical Test (FIT)  * Fecal DNA/Cologuard Test  * Flexible Sigmoidoscopy Age: 54-65 years old   Colonoscopy: every 10 years (may be performed more frequently if at higher risk)  OR  FOBT/FIT: every 1 year  OR  Cologuard: every 3 years  OR  Sigmoidoscopy: every 5 years  Screening may be recommended earlier than age 48 if at higher risk for colorectal cancer  Also, an individualized decision between you and your healthcare provider will decide whether screening between the ages of 74-80 would be appropriate  Colonoscopy: 04/23/2014  FOBT/FIT: Not on file  Cologuard: Not on file  Sigmoidoscopy: Not on file    Screening Current     Breast Cancer Screening Age: 36 years old  Frequency: every 1-2 years  Not required if history of left and right mastectomy Mammogram: 06/09/2021    Screening Current   Cervical Cancer Screening Between the ages of 21-29, pap smear recommended once every 3 years  Between the ages of 33-67, can perform pap smear with HPV co-testing every 5 years     Recommendations may differ for women with a history of total hysterectomy, cervical cancer, or abnormal pap smears in past  Pap Smear: 11/23/2018    Screening Not Indicated   Hepatitis C Screening Once for adults born between 1945 and 1965  More frequently in patients at high risk for Hepatitis C Hep C Antibody: Not on file    Screening Current   Diabetes Screening 1-2 times per year if you're at risk for diabetes or have pre-diabetes Fasting glucose: No results in last 5 years   A1C: 5 6 %        Cholesterol Screening Once every 5 years if you don't have a lipid disorder  May order more often based on risk factors  Lipid panel: 01/08/2021    Screening Current     Other Preventive Screenings Covered by Medicare:  1  Abdominal Aortic Aneurysm (AAA) Screening: covered once if your at risk  You're considered to be at risk if you have a family history of AAA  2  Lung Cancer Screening: covers low dose CT scan once per year if you meet all of the following conditions: (1) Age 50-69; (2) No signs or symptoms of lung cancer; (3) Current smoker or have quit smoking within the last 15 years; (4) You have a tobacco smoking history of at least 30 pack years (packs per day multiplied by number of years you smoked); (5) You get a written order from a healthcare provider  3  Glaucoma Screening: covered annually if you're considered high risk: (1) You have diabetes OR (2) Family history of glaucoma OR (3)  aged 48 and older OR (3)  American aged 72 and older  3  Osteoporosis Screening: covered every 2 years if you meet one of the following conditions: (1) You're estrogen deficient and at risk for osteoporosis based off medical history and other findings; (2) Have a vertebral abnormality; (3) On glucocorticoid therapy for more than 3 months; (4) Have primary hyperparathyroidism; (5) On osteoporosis medications and need to assess response to drug therapy  · Last bone density test (DXA Scan): Not on file    5  HIV Screening: covered annually if you're between the age of 12-76  Also covered annually if you are younger than 13 and older than 72 with risk factors for HIV infection  For pregnant patients, it is covered up to 3 times per pregnancy  Immunizations:  Immunization Recommendations   Influenza Vaccine Annual influenza vaccination during flu season is recommended for all persons aged >= 6 months who do not have contraindications   Pneumococcal Vaccine (Prevnar and Pneumovax)  * Prevnar = PCV13  * Pneumovax = PPSV23   Adults 25-60 years old: 1-3 doses may be recommended based on certain risk factors  Adults 72 years old: Prevnar (PCV13) vaccine recommended followed by Pneumovax (PPSV23) vaccine  If already received PPSV23 since turning 65, then PCV13 recommended at least one year after PPSV23 dose  Hepatitis B Vaccine 3 dose series if at intermediate or high risk (ex: diabetes, end stage renal disease, liver disease)   Tetanus (Td) Vaccine - COST NOT COVERED BY MEDICARE PART B Following completion of primary series, a booster dose should be given every 10 years to maintain immunity against tetanus  Td may also be given as tetanus wound prophylaxis  Tdap Vaccine - COST NOT COVERED BY MEDICARE PART B Recommended at least once for all adults  For pregnant patients, recommended with each pregnancy  Shingles Vaccine (Shingrix) - COST NOT COVERED BY MEDICARE PART B  2 shot series recommended in those aged 48 and above     Health Maintenance Due:      Topic Date Due    Breast Cancer Screening: Mammogram  06/09/2022    Cervical Cancer Screening  11/23/2023    Colorectal Cancer Screening  04/23/2024    Hepatitis C Screening  Addressed     Immunizations Due:      Topic Date Due    COVID-19 Vaccine (3 - Booster for [place] series) 02/14/2022     Advance Directives   What are advance directives? Advance directives are legal documents that state your wishes and plans for medical care   These plans are made ahead of time in case you lose your ability to make decisions for yourself  Advance directives can apply to any medical decision, such as the treatments you want, and if you want to donate organs  What are the types of advance directives? There are many types of advance directives, and each state has rules about how to use them  You may choose a combination of any of the following:  · Living will: This is a written record of the treatment you want  You can also choose which treatments you do not want, which to limit, and which to stop at a certain time  This includes surgery, medicine, IV fluid, and tube feedings  · Durable power of  for healthcare [facility]): This is a written record that states who you want to make healthcare choices for you when you are unable to make them for yourself  This person, called a proxy, is usually a family member or a friend  You may choose more than 1 proxy  · Do not resuscitate (DNR) order:  A DNR order is used in case your heart stops beating or you stop breathing  It is a request not to have certain forms of treatment, such as CPR  A DNR order may be included in other types of advance directives  · Medical directive: This covers the care that you want if you are in a coma, near death, or unable to make decisions for yourself  You can list the treatments you want for each condition  Treatment may include pain medicine, surgery, blood transfusions, dialysis, IV or tube feedings, and a ventilator (breathing machine)  · Values history: This document has questions about your views, beliefs, and how you feel and think about life  This information can help others choose the care that you would choose  Why are advance directives important? An advance directive helps you control your care  Although spoken wishes may be used, it is better to have your wishes written down  Spoken wishes can be misunderstood, or not followed  Treatments may be given even if you do not want them   An advance directive may make it easier for your family to make difficult choices about your care  Urinary Incontinence   Urinary incontinence (UI)  is when you lose control of your bladder  UI develops because your bladder cannot store or empty urine properly  The 3 most common types of UI are stress incontinence, urge incontinence, or both  Medicines:   · May be given to help strengthen your bladder control  Report any side effects of medication to your healthcare provider  Do pelvic muscle exercises often:  Your pelvic muscles help you stop urinating  Squeeze these muscles tight for 5 seconds, then relax for 5 seconds  Gradually work up to squeezing for 10 seconds  Do 3 sets of 15 repetitions a day, or as directed  This will help strengthen your pelvic muscles and improve bladder control  Train your bladder:  Go to the bathroom at set times, such as every 2 hours, even if you do not feel the urge to go  You can also try to hold your urine when you feel the urge to go  For example, hold your urine for 5 minutes when you feel the urge to go  As that becomes easier, hold your urine for 10 minutes  Self-care:   · Keep a UI record  Write down how often you leak urine and how much you leak  Make a note of what you were doing when you leaked urine  · Drink liquids as directed  You may need to limit the amount of liquid you drink to help control your urine leakage  Do not drink any liquid right before you go to bed  Limit or do not have drinks that contain caffeine or alcohol  · Prevent constipation  Eat a variety of high-fiber foods  Good examples are high-fiber cereals, beans, vegetables, and whole-grain breads  Walking is the best way to trigger your intestines to have a bowel movement  · Exercise regularly and maintain a healthy weight  Weight loss and exercise will decrease pressure on your bladder and help you control your leakage  · Use a catheter as directed  to help empty your bladder   A catheter is a tiny, plastic tube that is put into your bladder to drain your urine  · Go to behavior therapy as directed  Behavior therapy may be used to help you learn to control your urge to urinate  Weight Management   Why it is important to manage your weight:  Being overweight increases your risk of health conditions such as heart disease, high blood pressure, type 2 diabetes, and certain types of cancer  It can also increase your risk for osteoarthritis, sleep apnea, and other respiratory problems  Aim for a slow, steady weight loss  Even a small amount of weight loss can lower your risk of health problems  How to lose weight safely:  A safe and healthy way to lose weight is to eat fewer calories and get regular exercise  You can lose up about 1 pound a week by decreasing the number of calories you eat by 500 calories each day  Healthy meal plan for weight management:  A healthy meal plan includes a variety of foods, contains fewer calories, and helps you stay healthy  A healthy meal plan includes the following:  · Eat whole-grain foods more often  A healthy meal plan should contain fiber  Fiber is the part of grains, fruits, and vegetables that is not broken down by your body  Whole-grain foods are healthy and provide extra fiber in your diet  Some examples of whole-grain foods are whole-wheat breads and pastas, oatmeal, brown rice, and bulgur  · Eat a variety of vegetables every day  Include dark, leafy greens such as spinach, kale, laura greens, and mustard greens  Eat yellow and orange vegetables such as carrots, sweet potatoes, and winter squash  · Eat a variety of fruits every day  Choose fresh or canned fruit (canned in its own juice or light syrup) instead of juice  Fruit juice has very little or no fiber  · Eat low-fat dairy foods  Drink fat-free (skim) milk or 1% milk  Eat fat-free yogurt and low-fat cottage cheese  Try low-fat cheeses such as mozzarella and other reduced-fat cheeses    · Choose meat and other protein foods that are low in fat  Choose beans or other legumes such as split peas or lentils  Choose fish, skinless poultry (chicken or turkey), or lean cuts of red meat (beef or pork)  Before you cook meat or poultry, cut off any visible fat  · Use less fat and oil  Try baking foods instead of frying them  Add less fat, such as margarine, sour cream, regular salad dressing and mayonnaise to foods  Eat fewer high-fat foods  Some examples of high-fat foods include french fries, doughnuts, ice cream, and cakes  · Eat fewer sweets  Limit foods and drinks that are high in sugar  This includes candy, cookies, regular soda, and sweetened drinks  Exercise:  Exercise at least 30 minutes per day on most days of the week  Some examples of exercise include walking, biking, dancing, and swimming  You can also fit in more physical activity by taking the stairs instead of the elevator or parking farther away from stores  Ask your healthcare provider about the best exercise plan for you  Alcohol Use and Your Health    Drinking too much can harm your health  Excessive alcohol use leads to about 88,000 death in the [place] each year, and shortens the life of those who diet by almost 30 years  Further, excessive drinking cost the economy $249 billion in 2010  Most excessive drinkers are not alcohol dependent  Excessive alcohol use has immediate effects that increase the risk of many harmful health conditions  These are most often the result of binge drinking  Over time, excessive alcohol use can lead to the development of chronic diseases and other series health problems  What is considered a "drink"? Excessive alcohol use includes:  · Binge Drinking: For women, 4 or more drinks consumed on one occasion  For men, 5 or more drinks consumed on one occasion  · Heavy Drinking: For women, 8 or more drinks per week   For men, 15 or more drinks per week  · Any alcohol used by pregnant women  · Any alcohol used by those under the age of 21 years    If you choose to drink, do so in moderation:  · Do not drink at all if you are under the age of 24, or if you are or may be pregnant, or have health problems that could be made worse by drinking  · For women, up to 1 drink per day  · For men, up to 2 drinks a day    No one should begin drinking or drink more frequently based on potential health benefits    Short-Term Health Risks:  · Injuries: motor vehicle crashes, falls, drownings, burns  · Violence: homicide, suicide, sexual assault, intimate partner violence  · Alcohol poisoning  · Reproductive health: risky sexual behaviors, unintended prengnacy, sexually transmitted diseases, miscarriage, stillbirth, fetal alcohol syndrome    Long-Term Health Risks:  · Chronic diseases: high blood pressure, heart disease, stroke, liver disease, digestive problems  · Cancers: breast, mouth and throat, liver, colon  · Learning and memory problems: dementia, poor school performance  · Mental health: depression, anxiety, insomnia  · Social problems: lost productivity, family problems, unemployment  · Alcohol dependence    For support and more information:  · Substance Abuse and [facility] [address] [place]  Web Address: https://[employer]/    · Alcoholics Anonymous        Web Address: http://www [employer]/    https://www cdc gov/alcohol/fact-sheets/alcohol-use htm     © Copyright [employer] 2018 Information is for End User's use only and may not be sold, redistributed or otherwise used for commercial purposes   All illustrations and images included in CareNotes® are the copyrighted property of [employer]  or [address] [place] [address]

## 2022-02-16 NOTE — PROGRESS NOTES
BMI Counseling: There is no height or weight on file to calculate BMI  The BMI is above normal  Nutrition recommendations include encouraging healthy choices of fruits and vegetables and moderation in carbohydrate intake  Exercise recommendations include exercising 3-5 times per week  No pharmacotherapy was ordered  Rationale for BMI follow-up plan is due to patient being overweight or obese       Assessment/Plan:    Paroxysmal atrial fibrillation (HCC)  STABLE  FOLLOWED BY CARDIOLOGY      Hypertension  STABLE  DENIES ANY CP, SOB, PALPITATIONS, OR HEADACHE  NOTES NO WATER RETENTION  COMPLIANT WITH MEDICATION  NO CONCERNS    - CONTINUE CURRENT TREATMENT PLAN  - MONITOR DIETARY SODIUM INTAKE  - ENCOURAGE PHYSICAL ACTIVITY  - RV 6 MONTHS      Gastroesophageal reflux disease without esophagitis  STABLE  DENIES ANY CP, INDIGESTION, OR COUGH  NOTES NO MELENA OR HEMATOCHEZIA  NO HEMATEMESIS  COMPLIANT WITH MEDICATION  NO CONCERNS    - CONTINUE CURRENT TREATMENT PLAN  - MEDICATION AS PRESCRIBED  - AVOID CAFFEINE, ALCOHOL OR SMOKING      Arthritis  STABLE  DENIES ANY JOINT SWELLING OR REDNESS  JOINT STIFFNESS PRESENT  PAIN MANAGEMENT ADEQUATE    - CONTINUE CURRENT MANAGEMENT  - MEDICATION AS DIRECTED  - CALL / RETURN IF SYMPTOMS WORSEN         Diagnoses and all orders for this visit:    Paroxysmal atrial fibrillation ([facility] [address] )    Primary hypertension    Gastroesophageal reflux disease without esophagitis    Spinal stenosis of lumbar region with neurogenic claudication    Arthritis    Thyroid nodule    Medicare annual wellness visit, subsequent    Asymptomatic menopause    Screening for osteoporosis    Encounter for screening mammogram for breast cancer    Screening for lipid disorders          Patient Instructions   DISCUSSED HEALTH ISSUES  HEALTHY DIET AND EXERCISE  BW WILL BE OBTAINED  MAMMOGRAPHY   RECOMMEND CALCIUM 4694-6644 MG DAILY  VITAMIN D3  1000 IU DAILY  RV IN 1 YEAR FOR ANNUAL EXAM, SOONER IF NEEDED      RV 6M          No follow-ups on file  Subjective:      Patient ID: Zemadina Pate is a 73 y o  female  No chief complaint on file  PATIENT RETURNS FOR ANNUAL WELLNESS EXAM AND ANNUAL EVALUATION OF PATIENT'S MEDICAL ISSUES    INDIVIDUAL MEDICAL ISSUES WITH THEIR CURRENT STATUS, ASSESSMENT AND PLANS ARE LISTED ABOVE          The following portions of the patient's history were reviewed and updated as appropriate: allergies, current medications, past family history, past medical history, past social history, past surgical history and problem list     Review of Systems   Constitutional: Negative for chills, fatigue and fever  HENT: Negative for congestion, ear discharge, ear pain, mouth sores, postnasal drip, sore throat and trouble swallowing  Eyes: Negative for pain, discharge and visual disturbance  Respiratory: Negative for cough, shortness of breath and wheezing  Cardiovascular: Negative for chest pain, palpitations and leg swelling  Gastrointestinal: Negative for abdominal distention, abdominal pain, blood in stool, diarrhea and nausea  Endocrine: Negative for polydipsia, polyphagia and polyuria  Genitourinary: Negative for dysuria, frequency, hematuria and urgency  Musculoskeletal: Negative for arthralgias, gait problem and joint swelling  Skin: Negative for pallor and rash  Neurological: Negative for dizziness, syncope, speech difficulty, weakness, light-headedness, numbness and headaches  Hematological: Negative for adenopathy  Psychiatric/Behavioral: Negative for behavioral problems, confusion and sleep disturbance  The patient is not nervous/anxious            Current Outpatient Medications   Medication Sig Dispense Refill    Cholecalciferol 2000 units CAPS Take 1 capsule by mouth      Eliquis 5 MG 5 mg 2 (two) times a day       EPINEPHrine (EPIPEN) 0 3 mg/0 3 mL SOAJ Inject 0 3 mL (0 3 mg total) into a muscle once for 1 dose 0 6 mL 0    loratadine (CLARITIN) 10 mg tablet Take 10 mg by mouth daily as needed        losartan (COZAAR) 100 MG tablet TAKE ONE TABLET BY MOUTH EVERY DAY 90 tablet 1    Multiple Vitamins-Minerals (OCUVITE ADULT FORMULA PO) Take by mouth daily      Naproxen Sodium (Aleve) 220 MG CAPS Take by mouth daily as needed      Omega-3 Fatty Acids (Fish Oil) 1000 MG CPDR Take by mouth      pantoprazole (PROTONIX) 40 mg tablet Take 1 tablet by mouth Daily      PREMARIN vaginal cream Insert into the vagina 2 (two) times a week        No current facility-administered medications for this visit  Objective: There were no vitals taken for this visit  Physical Exam  Vitals reviewed  Constitutional:       Appearance: She is well-developed  HENT:      Head: Normocephalic and atraumatic  Right Ear: External ear normal       Left Ear: External ear normal       Nose: Nose normal    Eyes:      General:         Right eye: No discharge  Left eye: No discharge  Conjunctiva/sclera: Conjunctivae normal       Pupils: Pupils are equal, round, and reactive to light  Neck:      Thyroid: No thyromegaly  Cardiovascular:      Rate and Rhythm: Normal rate and regular rhythm  Heart sounds: Normal heart sounds  No murmur heard  Pulmonary:      Effort: Pulmonary effort is normal       Breath sounds: Normal breath sounds  No wheezing or rales  Abdominal:      General: Bowel sounds are normal  There is no distension  Palpations: Abdomen is soft  There is no mass  Tenderness: There is no abdominal tenderness  There is no guarding or rebound  Musculoskeletal:         General: No tenderness or deformity  Normal range of motion  Cervical back: Normal range of motion and neck supple  Lymphadenopathy:      Cervical: No cervical adenopathy  Skin:     General: Skin is warm and dry  Findings: No erythema or rash  Neurological:      Mental Status: She is alert and oriented to person, place, and time  Cranial Nerves:  No cranial nerve deficit  Motor: No abnormal muscle tone  Coordination: Coordination normal       Deep Tendon Reflexes: Reflexes are normal and symmetric  Psychiatric:         Behavior: Behavior normal          Thought Content: Thought content normal          Judgment: Judgment normal                 Zenia Spaulding MD    Answers for HPI/ROS submitted by the patient on 2/10/2022  How often during the last year have you found that you were not able to stop drinking once you had started?: 0 - never  How often during the last year have you failed to do what was normally expected from you because of drinking?: 0 - never  How often during the last year have you needed a first drink in the morning to get yourself going after a heavy drinking session?: 0 - never  How often during the last year have you had a feeling of guilt or remorse after drinking?: 0 - never  How often during the last year have you been unable to remember what happened the night before because you had been drinking?: 0 - never  Have you or someone else been injured as a result of your drinking?: 0 - no  Has a relative or friend or a doctor or another health worker been concerned about your drinking or suggested you cut down?: 0 - no  How would you rate your overall health?: good  Compared to last year, how is your physical health?: same  In general, how satisfied are you with your life?: satisfied  Compared to last year, how is your eyesight?: same  Compared to last year, how is your hearing?: same  Compared to last year, how is your emotional/mental health?: same  How often is anger a problem for you?: never, rarely  How often do you feel unusually tired/fatigued?: sometimes  In the past 7 days, how much pain have you experienced?: some  If you answered "some" or "a lot", please rate the severity of your pain on a scale of 1 to 10 (1 being the least severe pain and 10 being the most intense pain)  : 5/10  In the past 6 months, have you lost or gained 10 pounds without trying?: No  One or more falls in the last year: No  In the past 6 months, have you accidentally leaked urine?: Yes  Additional Comments: Usually when bladder is full and I sneeze  Do you have trouble with the stairs inside or outside your home?: No  Does your home have working smoke alarms?: Yes  Does your home have a carbon monoxide monitor?: Yes  Which safety hazards (if any) have you experienced in your home? Please select all that apply : poor household lighting, not having non-slip bath and/or shower mats  How would you describe your current diet?  Please select all that apply : Regular  In addition to prescription medications, are you taking any over-the-counter supplements?: Yes  If yes, what supplements are you taking?: Vitamin D, Ocuvite, Fish oil  Can you manage your medications?: Yes  Are you currently taking any opioid medications?: No  Can you walk and transfer into and out of your bed and chair?: Yes  Can you dress and groom yourself?: Yes  Can you bathe or shower yourself?: Yes  Can you feed yourself?: Yes  Can you do your laundry/ housekeeping?: Yes  Can you manage your money, pay your bills, and track your expenses?: Yes  Can you make your own meals?: Yes  Can you do your own shopping?: Yes  Within the last 12 months, have you had any hospitalizations or Emergency Department visits?: No  Do you have a living will?: Yes  Do you have a Durable POA (Power of ) for healthcare decisions?: No  Do you have an Advanced Directive for end of life decisions?: Yes  How often have you used an illegal drug (including marijuana) or a prescription medication for non-medical reasons in the past year?: never  What is the typical number of drinks you consume in a day?: 1  What is the typical number of drinks you consume in a week?: 5  How often did you have a drink containing alcohol in the past year?: 2 to 3 times a week  How many drinks did you have on a typical day  when you were drinking in the past year?: 1 to 2  How often did you have 6 or more drinks on one occasion in the past year?: never

## 2022-02-17 LAB
ALBUMIN SERPL-MCNC: 4.4 G/DL (ref 3.8–4.8)
ALBUMIN/GLOB SERPL: 2.3 {RATIO} (ref 1.2–2.2)
ALP SERPL-CCNC: 71 IU/L (ref 44–121)
ALT SERPL-CCNC: 16 IU/L (ref 0–32)
AST SERPL-CCNC: 26 IU/L (ref 0–40)
BASOPHILS # BLD AUTO: 0 X10E3/UL (ref 0–0.2)
BASOPHILS NFR BLD AUTO: 1 %
BILIRUB SERPL-MCNC: 0.3 MG/DL (ref 0–1.2)
BUN SERPL-MCNC: 20 MG/DL (ref 8–27)
BUN/CREAT SERPL: 31 (ref 12–28)
CALCIUM SERPL-MCNC: 9.8 MG/DL (ref 8.7–10.3)
CHLORIDE SERPL-SCNC: 103 MMOL/L (ref 96–106)
CHOLEST SERPL-MCNC: 218 MG/DL (ref 100–199)
CHOLEST/HDLC SERPL: 2 RATIO (ref 0–4.4)
CO2 SERPL-SCNC: 24 MMOL/L (ref 20–29)
CREAT SERPL-MCNC: 0.65 MG/DL (ref 0.57–1)
CRP SERPL-MCNC: <1 MG/L (ref 0–10)
EOSINOPHIL # BLD AUTO: 0.1 X10E3/UL (ref 0–0.4)
EOSINOPHIL NFR BLD AUTO: 2 %
ERYTHROCYTE [DISTWIDTH] IN BLOOD BY AUTOMATED COUNT: 13.4 % (ref 11.7–15.4)
ERYTHROCYTE [SEDIMENTATION RATE] IN BLOOD BY WESTERGREN METHOD: 17 MM/HR (ref 0–40)
GLOBULIN SER-MCNC: 1.9 G/DL (ref 1.5–4.5)
GLUCOSE SERPL-MCNC: 84 MG/DL (ref 65–99)
HCT VFR BLD AUTO: 38.5 % (ref 34–46.6)
HDLC SERPL-MCNC: 111 MG/DL
HGB BLD-MCNC: 12.3 G/DL (ref 11.1–15.9)
IMM GRANULOCYTES # BLD: 0 X10E3/UL (ref 0–0.1)
IMM GRANULOCYTES NFR BLD: 0 %
LDLC SERPL CALC-MCNC: 98 MG/DL (ref 0–99)
LYMPHOCYTES # BLD AUTO: 1.6 X10E3/UL (ref 0.7–3.1)
LYMPHOCYTES NFR BLD AUTO: 29 %
MCH RBC QN AUTO: 27.6 PG (ref 26.6–33)
MCHC RBC AUTO-ENTMCNC: 31.9 G/DL (ref 31.5–35.7)
MCV RBC AUTO: 86 FL (ref 79–97)
MONOCYTES # BLD AUTO: 0.6 X10E3/UL (ref 0.1–0.9)
MONOCYTES NFR BLD AUTO: 11 %
NEUTROPHILS # BLD AUTO: 3.2 X10E3/UL (ref 1.4–7)
NEUTROPHILS NFR BLD AUTO: 57 %
PLATELET # BLD AUTO: 260 X10E3/UL (ref 150–450)
POTASSIUM SERPL-SCNC: 4.8 MMOL/L (ref 3.5–5.2)
PROT SERPL-MCNC: 6.3 G/DL (ref 6–8.5)
RBC # BLD AUTO: 4.46 X10E6/UL (ref 3.77–5.28)
SL AMB EGFR AFRICAN AMERICAN: 105 ML/MIN/1.73
SL AMB EGFR NON AFRICAN AMERICAN: 91 ML/MIN/1.73
SL AMB VLDL CHOLESTEROL CALC: 9 MG/DL (ref 5–40)
SODIUM SERPL-SCNC: 140 MMOL/L (ref 134–144)
TRIGL SERPL-MCNC: 47 MG/DL (ref 0–149)
TSH SERPL DL<=0.005 MIU/L-ACNC: 2.05 UIU/ML (ref 0.45–4.5)
URATE SERPL-MCNC: 3.1 MG/DL (ref 3–7.2)
WBC # BLD AUTO: 5.5 X10E3/UL (ref 3.4–10.8)

## 2022-02-25 ENCOUNTER — OFFICE VISIT (OUTPATIENT)
Dept: FAMILY MEDICINE CLINIC | Facility: CLINIC | Age: 72
End: 2022-02-25
Payer: COMMERCIAL

## 2022-02-25 VITALS
DIASTOLIC BLOOD PRESSURE: 68 MMHG | OXYGEN SATURATION: 98 % | HEART RATE: 71 BPM | HEIGHT: 63 IN | BODY MASS INDEX: 28.35 KG/M2 | RESPIRATION RATE: 16 BRPM | WEIGHT: 160 LBS | TEMPERATURE: 97.2 F | SYSTOLIC BLOOD PRESSURE: 118 MMHG

## 2022-02-25 DIAGNOSIS — N30.01 ACUTE CYSTITIS WITH HEMATURIA: Primary | ICD-10-CM

## 2022-02-25 DIAGNOSIS — R31.9 HEMATURIA, UNSPECIFIED TYPE: ICD-10-CM

## 2022-02-25 DIAGNOSIS — R82.998 LEUKOCYTES IN URINE: ICD-10-CM

## 2022-02-25 DIAGNOSIS — N39.0 RECURRENT UTI: ICD-10-CM

## 2022-02-25 LAB
SL AMB  POCT GLUCOSE, UA: ABNORMAL
SL AMB LEUKOCYTE ESTERASE,UA: 75
SL AMB POCT BILIRUBIN,UA: 0
SL AMB POCT BLOOD,UA: 250
SL AMB POCT CLARITY,UA: CLEAR
SL AMB POCT COLOR,UA: YELLOW
SL AMB POCT KETONES,UA: 0
SL AMB POCT NITRITE,UA: 0
SL AMB POCT PH,UA: 6
SL AMB POCT SPECIFIC GRAVITY,UA: 1.01
SL AMB POCT URINE PROTEIN: 0
SL AMB POCT UROBILINOGEN: ABNORMAL

## 2022-02-25 PROCEDURE — 81003 URINALYSIS AUTO W/O SCOPE: CPT | Performed by: NURSE PRACTITIONER

## 2022-02-25 PROCEDURE — 3074F SYST BP LT 130 MM HG: CPT | Performed by: NURSE PRACTITIONER

## 2022-02-25 PROCEDURE — 99213 OFFICE O/P EST LOW 20 MIN: CPT | Performed by: NURSE PRACTITIONER

## 2022-02-25 PROCEDURE — 1160F RVW MEDS BY RX/DR IN RCRD: CPT | Performed by: NURSE PRACTITIONER

## 2022-02-25 PROCEDURE — 3078F DIAST BP <80 MM HG: CPT | Performed by: NURSE PRACTITIONER

## 2022-02-25 PROCEDURE — 3008F BODY MASS INDEX DOCD: CPT | Performed by: NURSE PRACTITIONER

## 2022-02-25 PROCEDURE — 1036F TOBACCO NON-USER: CPT | Performed by: NURSE PRACTITIONER

## 2022-02-25 RX ORDER — NITROFURANTOIN 25; 75 MG/1; MG/1
100 CAPSULE ORAL 2 TIMES DAILY
Qty: 10 CAPSULE | Refills: 0 | Status: SHIPPED | OUTPATIENT
Start: 2022-02-25 | End: 2022-03-02

## 2022-02-25 RX ORDER — CIPROFLOXACIN 500 MG/1
500 TABLET, FILM COATED ORAL EVERY 12 HOURS SCHEDULED
Qty: 14 TABLET | Refills: 0 | Status: SHIPPED | OUTPATIENT
Start: 2022-02-25 | End: 2022-03-04

## 2022-02-25 NOTE — PROGRESS NOTES
Assessment/Plan:    1  Acute cystitis with hematuria  -     ciprofloxacin (CIPRO) 500 mg tablet; Take 1 tablet (500 mg total) by mouth every 12 (twelve) hours for 7 days    2  Hematuria, unspecified type  -     Urine culture  -     POCT urine dip auto non-scope    3  Leukocytes in urine  -     Urine culture  -     POCT urine dip auto non-scope          Return in about 1 week (around 3/4/2022), or if symptoms worsen or fail to improve  Subjective:      Patient ID: Rhina Scott is a 73 y o  female  Chief Complaint   Patient presents with    Pelvic Pain    Blood in Urine    Urinary Frequency       Urinary Frequency   This is a new problem  The current episode started yesterday  The problem occurs every urination  The problem has been unchanged  The quality of the pain is described as burning  There has been no fever  Associated symptoms include frequency, hematuria and urgency  Pertinent negatives include no chills, discharge, flank pain or nausea  She has tried nothing for the symptoms  The treatment provided no relief  Her past medical history is significant for recurrent UTIs  The following portions of the patient's history were reviewed and updated as appropriate: allergies, current medications, past family history, past medical history, past social history, past surgical history and problem list     Review of Systems   Constitutional: Negative for chills, fatigue and fever  Respiratory: Negative for cough, chest tightness and shortness of breath  Cardiovascular: Negative for chest pain  Gastrointestinal: Negative for constipation, diarrhea and nausea  Genitourinary: Positive for dysuria, frequency, hematuria and urgency  Negative for flank pain           Current Outpatient Medications   Medication Sig Dispense Refill    Cholecalciferol 2000 units CAPS Take 1 capsule by mouth      Eliquis 5 MG 5 mg 2 (two) times a day       loratadine (CLARITIN) 10 mg tablet Take 10 mg by mouth daily as needed        losartan (COZAAR) 100 MG tablet TAKE ONE TABLET BY MOUTH EVERY DAY 90 tablet 1    Multiple Vitamins-Minerals (OCUVITE ADULT FORMULA PO) Take by mouth daily      Naproxen Sodium (Aleve) 220 MG CAPS Take by mouth daily as needed      Omega-3 Fatty Acids (Fish Oil) 1000 MG CPDR Take by mouth      pantoprazole (PROTONIX) 40 mg tablet Take 1 tablet by mouth Daily      PREMARIN vaginal cream Insert into the vagina 2 (two) times a week       ciprofloxacin (CIPRO) 500 mg tablet Take 1 tablet (500 mg total) by mouth every 12 (twelve) hours for 7 days 14 tablet 0    EPINEPHrine (EPIPEN) 0 3 mg/0 3 mL SOAJ Inject 0 3 mL (0 3 mg total) into a muscle once for 1 dose 0 6 mL 0    nitrofurantoin (MACROBID) 100 mg capsule Take 1 capsule (100 mg total) by mouth 2 (two) times a day for 5 days 10 capsule 0     No current facility-administered medications for this visit  Objective:    /68   Pulse 71   Temp (!) 97 2 °F (36 2 °C) (Temporal)   Resp 16   Ht 5' 2 5" (1 588 m)   Wt 72 6 kg (160 lb)   SpO2 98%   BMI 28 80 kg/m²        Physical Exam  Constitutional:       General: She is not in acute distress  Appearance: She is well-developed  She is not diaphoretic  HENT:      Head: Normocephalic and atraumatic  Eyes:      General:         Right eye: No discharge  Left eye: No discharge  Conjunctiva/sclera: Conjunctivae normal    Neck:      Thyroid: No thyromegaly  Cardiovascular:      Rate and Rhythm: Normal rate and regular rhythm  Heart sounds: Normal heart sounds  Pulmonary:      Effort: Pulmonary effort is normal  No respiratory distress  Breath sounds: Normal breath sounds  No decreased breath sounds, wheezing, rhonchi or rales  Abdominal:      General: Bowel sounds are normal  There is no distension  Tenderness: There is no abdominal tenderness  Musculoskeletal:      Cervical back: Normal range of motion and neck supple     Lymphadenopathy: Cervical: No cervical adenopathy  Skin:     General: Skin is warm and dry  Findings: No rash  Neurological:      Mental Status: She is alert and oriented to person, place, and time  Psychiatric:         Behavior: Behavior normal          Thought Content:  Thought content normal          Judgment: Judgment normal                 KADI Ervin

## 2022-03-01 LAB
BACTERIA UR CULT: ABNORMAL
Lab: ABNORMAL
SL AMB ANTIMICROBIAL SUSCEPTIBILITY: ABNORMAL

## 2022-06-16 DIAGNOSIS — I10 ESSENTIAL HYPERTENSION: ICD-10-CM

## 2022-06-16 RX ORDER — LOSARTAN POTASSIUM 100 MG/1
TABLET ORAL
Qty: 90 TABLET | Refills: 1 | Status: SHIPPED | OUTPATIENT
Start: 2022-06-16

## 2022-06-29 DIAGNOSIS — I48.0 PAROXYSMAL ATRIAL FIBRILLATION (HCC): Primary | ICD-10-CM

## 2022-06-29 DIAGNOSIS — I10 PRIMARY HYPERTENSION: ICD-10-CM

## 2022-08-09 LAB
ALBUMIN SERPL-MCNC: 4.5 G/DL (ref 3.8–4.8)
ALBUMIN/GLOB SERPL: 2.6 {RATIO} (ref 1.2–2.2)
ALP SERPL-CCNC: 71 IU/L (ref 44–121)
ALT SERPL-CCNC: 14 IU/L (ref 0–32)
AST SERPL-CCNC: 20 IU/L (ref 0–40)
BILIRUB SERPL-MCNC: 0.4 MG/DL (ref 0–1.2)
BUN SERPL-MCNC: 27 MG/DL (ref 8–27)
BUN/CREAT SERPL: 47 (ref 12–28)
CALCIUM SERPL-MCNC: 9.8 MG/DL (ref 8.7–10.3)
CHLORIDE SERPL-SCNC: 105 MMOL/L (ref 96–106)
CHOLEST SERPL-MCNC: 220 MG/DL (ref 100–199)
CHOLEST/HDLC SERPL: 2.1 RATIO (ref 0–4.4)
CO2 SERPL-SCNC: 25 MMOL/L (ref 20–29)
CREAT SERPL-MCNC: 0.57 MG/DL (ref 0.57–1)
EGFR: 98 ML/MIN/1.73
GLOBULIN SER-MCNC: 1.7 G/DL (ref 1.5–4.5)
GLUCOSE SERPL-MCNC: 85 MG/DL (ref 65–99)
HDLC SERPL-MCNC: 107 MG/DL
LDLC SERPL CALC-MCNC: 105 MG/DL (ref 0–99)
POTASSIUM SERPL-SCNC: 5.3 MMOL/L (ref 3.5–5.2)
PROT SERPL-MCNC: 6.2 G/DL (ref 6–8.5)
SL AMB VLDL CHOLESTEROL CALC: 8 MG/DL (ref 5–40)
SODIUM SERPL-SCNC: 144 MMOL/L (ref 134–144)
TRIGL SERPL-MCNC: 46 MG/DL (ref 0–149)

## 2022-08-10 ENCOUNTER — HOSPITAL ENCOUNTER (OUTPATIENT)
Dept: RADIOLOGY | Facility: HOSPITAL | Age: 72
Discharge: HOME | End: 2022-08-10
Payer: COMMERCIAL

## 2022-08-10 ENCOUNTER — RA CDI HCC (OUTPATIENT)
Dept: OTHER | Facility: HOSPITAL | Age: 72
End: 2022-08-10

## 2022-08-10 ENCOUNTER — APPOINTMENT (OUTPATIENT)
Dept: RADIOLOGY | Facility: HOSPITAL | Age: 72
End: 2022-08-10
Payer: COMMERCIAL

## 2022-08-10 VITALS — BODY MASS INDEX: 30.21 KG/M2 | WEIGHT: 160 LBS | HEIGHT: 61 IN

## 2022-08-10 DIAGNOSIS — Z78.0 ASYMPTOMATIC MENOPAUSE: ICD-10-CM

## 2022-08-10 DIAGNOSIS — E04.1 THYROID NODULE: ICD-10-CM

## 2022-08-10 DIAGNOSIS — Z13.820 SCREENING FOR OSTEOPOROSIS: ICD-10-CM

## 2022-08-10 DIAGNOSIS — Z12.31 ENCOUNTER FOR SCREENING MAMMOGRAM FOR BREAST CANCER: ICD-10-CM

## 2022-08-10 PROCEDURE — 77080 DXA BONE DENSITY AXIAL: CPT

## 2022-08-10 PROCEDURE — 76536 US EXAM OF HEAD AND NECK: CPT

## 2022-08-10 PROCEDURE — 77067 SCR MAMMO BI INCL CAD: CPT

## 2022-08-10 PROCEDURE — 77063 BREAST TOMOSYNTHESIS BI: CPT

## 2022-08-10 NOTE — PROGRESS NOTES
[facility]  coding opportunities       Chart reviewed, no opportunity found:   [address]        Patients Insurance     Medicare Insurance: [employer]

## 2022-08-17 ENCOUNTER — OFFICE VISIT (OUTPATIENT)
Dept: FAMILY MEDICINE CLINIC | Facility: CLINIC | Age: 72
End: 2022-08-17
Payer: COMMERCIAL

## 2022-08-17 VITALS
HEART RATE: 54 BPM | DIASTOLIC BLOOD PRESSURE: 78 MMHG | OXYGEN SATURATION: 99 % | WEIGHT: 159 LBS | HEIGHT: 61 IN | SYSTOLIC BLOOD PRESSURE: 132 MMHG | BODY MASS INDEX: 30.02 KG/M2 | RESPIRATION RATE: 12 BRPM | TEMPERATURE: 96.9 F

## 2022-08-17 DIAGNOSIS — K21.9 GASTROESOPHAGEAL REFLUX DISEASE WITHOUT ESOPHAGITIS: ICD-10-CM

## 2022-08-17 DIAGNOSIS — I10 PRIMARY HYPERTENSION: Primary | ICD-10-CM

## 2022-08-17 DIAGNOSIS — I48.0 PAROXYSMAL ATRIAL FIBRILLATION (HCC): ICD-10-CM

## 2022-08-17 DIAGNOSIS — L50.9 URTICARIA: ICD-10-CM

## 2022-08-17 DIAGNOSIS — I10 ESSENTIAL HYPERTENSION: ICD-10-CM

## 2022-08-17 DIAGNOSIS — E04.1 THYROID NODULE: ICD-10-CM

## 2022-08-17 PROCEDURE — 3075F SYST BP GE 130 - 139MM HG: CPT | Performed by: FAMILY MEDICINE

## 2022-08-17 PROCEDURE — 99214 OFFICE O/P EST MOD 30 MIN: CPT | Performed by: FAMILY MEDICINE

## 2022-08-17 PROCEDURE — 3078F DIAST BP <80 MM HG: CPT | Performed by: FAMILY MEDICINE

## 2022-08-17 PROCEDURE — 1160F RVW MEDS BY RX/DR IN RCRD: CPT | Performed by: FAMILY MEDICINE

## 2022-08-17 RX ORDER — EPINEPHRINE 0.3 MG/.3ML
0.3 INJECTION SUBCUTANEOUS ONCE
Qty: 0.6 ML | Refills: 0 | Status: SHIPPED | OUTPATIENT
Start: 2022-08-17 | End: 2022-08-17

## 2022-08-17 RX ORDER — LOSARTAN POTASSIUM 100 MG/1
100 TABLET ORAL DAILY
Qty: 90 TABLET | Refills: 1 | Status: SHIPPED | OUTPATIENT
Start: 2022-08-17

## 2022-08-17 NOTE — PROGRESS NOTES
Assessment/Plan:    Hypertension  STABLE  DENIES ANY CP, SOB, PALPITATIONS, OR HEADACHE  NOTES NO WATER RETENTION  COMPLIANT WITH MEDICATION  NO CONCERNS    - CONTINUE CURRENT TREATMENT PLAN  - MONITOR DIETARY SODIUM INTAKE  - ENCOURAGE PHYSICAL ACTIVITY  - RV 6 MONTHS      Gastroesophageal reflux disease without esophagitis  STABLE  DENIES ANY CP, INDIGESTION, OR COUGH  NOTES NO MELENA OR HEMATOCHEZIA  NO HEMATEMESIS  COMPLIANT WITH MEDICATION  NO CONCERNS    - CONTINUE CURRENT TREATMENT PLAN  - MEDICATION AS PRESCRIBED  - AVOID CAFFEINE, ALCOHOL OR SMOKING      Paroxysmal atrial fibrillation (HCC)  STABLE       Diagnoses and all orders for this visit:    Primary hypertension    Gastroesophageal reflux disease without esophagitis    Paroxysmal atrial fibrillation (HCC)    Thyroid nodule  -     Ambulatory Referral to Otolaryngology; Future    Urticaria  -     EPINEPHrine (EPIPEN) 0 3 mg/0 3 mL SOAJ; Inject 0 3 mL (0 3 mg total) into a muscle once for 1 dose    Essential hypertension  -     losartan (COZAAR) 100 MG tablet; Take 1 tablet (100 mg total) by mouth daily        Patient Instructions   CONTINUE CURRENT TREATMENT PLAN  MONITOR DIETARY SODIUM, CHOL INTAKE  ENCOURAGE PHYSICAL ACTIVITY      RV 6 M, SOONER PRN      Recent Results (from the past 672 hour(s))   Lipid panel    Collection Time: 08/08/22  7:55 AM   Result Value Ref Range    Cholesterol, Total 220 (H) 100 - 199 mg/dL    Triglycerides 46 0 - 149 mg/dL     >39 mg/dL    VLDL Cholesterol Calculated 8 5 - 40 mg/dL    LDL Calculated 105 (H) 0 - 99 mg/dL    T   Chol/HDL Ratio 2 1 0 0 - 4 4 ratio   Comprehensive metabolic panel    Collection Time: 08/08/22  7:55 AM   Result Value Ref Range    Glucose, Random 85 65 - 99 mg/dL    BUN 27 8 - 27 mg/dL    Creatinine 0 57 0 57 - 1 00 mg/dL    eGFR 98 >59 mL/min/1 73    SL AMB BUN/CREATININE RATIO 47 (H) 12 - 28    Sodium 144 134 - 144 mmol/L    Potassium 5 3 (H) 3 5 - 5 2 mmol/L    Chloride 105 96 - 106 mmol/L    CO2 25 20 - 29 mmol/L    CALCIUM 9 8 8 7 - 10 3 mg/dL    Protein, Total 6 2 6 0 - 8 5 g/dL    Albumin 4 5 3 8 - 4 8 g/dL    Globulin, Total 1 7 1 5 - 4 5 g/dL    Albumin/Globulin Ratio 2 6 (H) 1 2 - 2 2    TOTAL BILIRUBIN 0 4 0 0 - 1 2 mg/dL    Alk Phos Isoenzymes 71 44 - 121 IU/L    AST 20 0 - 40 IU/L    ALT 14 0 - 32 IU/L         Return in about 6 months (around 2/17/2023) for Annual physical     Subjective:      Patient ID: Tammy Castro is a 73 y o  female  Chief Complaint   Patient presents with    Blood Pressure Check       PATIENT RETURNS FOR ROUTINE EVALUATION OF PATIENT'S MEDICAL ISSUES    INDIVIDUAL MEDICAL ISSUES WITH THEIR CURRENT STATUS, ASSESSMENT AND PLANS ARE LISTED ABOVE          The following portions of the patient's history were reviewed and updated as appropriate: allergies, current medications, past family history, past medical history, past social history, past surgical history and problem list     Review of Systems   Constitutional: Negative for chills, fatigue and fever  HENT: Negative for congestion, ear discharge, ear pain, mouth sores, postnasal drip, sore throat and trouble swallowing  Eyes: Negative for pain, discharge and visual disturbance  Respiratory: Negative for cough, shortness of breath and wheezing  Cardiovascular: Negative for chest pain, palpitations and leg swelling  Gastrointestinal: Negative for abdominal distention, abdominal pain, blood in stool, diarrhea and nausea  Endocrine: Negative for polydipsia, polyphagia and polyuria  Genitourinary: Negative for dysuria, frequency, hematuria and urgency  Musculoskeletal: Positive for arthralgias  Negative for gait problem and joint swelling  Skin: Negative for pallor and rash  Neurological: Negative for dizziness, syncope, speech difficulty, weakness, light-headedness, numbness and headaches  Hematological: Negative for adenopathy     Psychiatric/Behavioral: Negative for behavioral problems, confusion and sleep disturbance  The patient is not nervous/anxious  Current Outpatient Medications   Medication Sig Dispense Refill    Cholecalciferol 2000 units CAPS Take 1 capsule by mouth      Eliquis 5 MG 5 mg 2 (two) times a day       EPINEPHrine (EPIPEN) 0 3 mg/0 3 mL SOAJ Inject 0 3 mL (0 3 mg total) into a muscle once for 1 dose 0 6 mL 0    loratadine (CLARITIN) 10 mg tablet Take 10 mg by mouth daily as needed        losartan (COZAAR) 100 MG tablet Take 1 tablet (100 mg total) by mouth daily 90 tablet 1    Multiple Vitamins-Minerals (OCUVITE ADULT FORMULA PO) Take by mouth daily      Naproxen Sodium 220 MG CAPS Take by mouth daily as needed      Omega-3 Fatty Acids (Fish Oil) 1000 MG CPDR Take by mouth      pantoprazole (PROTONIX) 40 mg tablet Take 1 tablet by mouth Daily      PREMARIN vaginal cream Insert into the vagina 2 (two) times a week        No current facility-administered medications for this visit  Objective:    /78   Pulse (!) 54   Temp (!) 96 9 °F (36 1 °C) (Temporal)   Resp 12   Ht 5' 0 6" (1 539 m)   Wt 72 1 kg (159 lb)   SpO2 99%   BMI 30 44 kg/m²        Physical Exam  Constitutional:       Appearance: She is well-developed  HENT:      Head: Normocephalic and atraumatic  Eyes:      General:         Right eye: No discharge  Left eye: No discharge  Conjunctiva/sclera: Conjunctivae normal       Pupils: Pupils are equal, round, and reactive to light  Neck:      Thyroid: No thyromegaly  Cardiovascular:      Rate and Rhythm: Normal rate and regular rhythm  Heart sounds: Normal heart sounds  No murmur heard  Pulmonary:      Effort: Pulmonary effort is normal  No respiratory distress  Breath sounds: Normal breath sounds  No wheezing or rales  Abdominal:      General: Bowel sounds are normal       Palpations: Abdomen is soft  Tenderness: There is no abdominal tenderness  Musculoskeletal:         General: No tenderness  Normal range of motion  Cervical back: Normal range of motion and neck supple  Lymphadenopathy:      Cervical: No cervical adenopathy  Skin:     General: Skin is warm and dry  Findings: No erythema or rash  Neurological:      Mental Status: She is alert and oriented to person, place, and time  Psychiatric:         Behavior: Behavior normal          Thought Content:  Thought content normal          Judgment: Judgment normal                 Pratibha Alan MD

## 2022-08-17 NOTE — LETTER
TAINA MUÑOZ      Current Outpatient Medications:     Cholecalciferol 2000 units CAPS, Take 1 capsule by mouth, Disp: , Rfl:     Eliquis 5 MG, 5 mg 2 (two) times a day , Disp: , Rfl:     EPINEPHrine (EPIPEN) 0 3 mg/0 3 mL SOAJ, Inject 0 3 mL (0 3 mg total) into a muscle once for 1 dose, Disp: 0 6 mL, Rfl: 0    loratadine (CLARITIN) 10 mg tablet, Take 10 mg by mouth daily as needed  , Disp: , Rfl:     losartan (COZAAR) 100 MG tablet, TAKE ONE TABLET BY MOUTH EVERY DAY, Disp: 90 tablet, Rfl: 1    Multiple Vitamins-Minerals (OCUVITE ADULT FORMULA PO), Take by mouth daily, Disp: , Rfl:     Naproxen Sodium (Aleve) 220 MG CAPS, Take by mouth daily as needed, Disp: , Rfl:     Omega-3 Fatty Acids (Fish Oil) 1000 MG CPDR, Take by mouth, Disp: , Rfl:     pantoprazole (PROTONIX) 40 mg tablet, Take 1 tablet by mouth Daily, Disp: , Rfl:     PREMARIN vaginal cream, Insert into the vagina 2 (two) times a week , Disp: , Rfl:       Recent Results (from the past 672 hour(s))   Lipid panel    Collection Time: 08/08/22  7:55 AM   Result Value Ref Range    Cholesterol, Total 220 (H) 100 - 199 mg/dL    Triglycerides 46 0 - 149 mg/dL     >39 mg/dL    VLDL Cholesterol Calculated 8 5 - 40 mg/dL    LDL Calculated 105 (H) 0 - 99 mg/dL    T   Chol/HDL Ratio 2 1 0 0 - 4 4 ratio   Comprehensive metabolic panel    Collection Time: 08/08/22  7:55 AM   Result Value Ref Range    Glucose, Random 85 65 - 99 mg/dL    BUN 27 8 - 27 mg/dL    Creatinine 0 57 0 57 - 1 00 mg/dL    eGFR 98 >59 mL/min/1 73    SL AMB BUN/CREATININE RATIO 47 (H) 12 - 28    Sodium 144 134 - 144 mmol/L    Potassium 5 3 (H) 3 5 - 5 2 mmol/L    Chloride 105 96 - 106 mmol/L    CO2 25 20 - 29 mmol/L    CALCIUM 9 8 8 7 - 10 3 mg/dL    Protein, Total 6 2 6 0 - 8 5 g/dL    Albumin 4 5 3 8 - 4 8 g/dL    Globulin, Total 1 7 1 5 - 4 5 g/dL    Albumin/Globulin Ratio 2 6 (H) 1 2 - 2 2    TOTAL BILIRUBIN 0 4 0 0 - 1 2 mg/dL    Alk Phos Isoenzymes 71 44 - 121 IU/L    AST 20 0 - 40 IU/L    ALT 14 0 - 32 IU/L

## 2022-08-17 NOTE — PATIENT INSTRUCTIONS
CONTINUE CURRENT TREATMENT PLAN  MONITOR DIETARY SODIUM, CHOL INTAKE  ENCOURAGE PHYSICAL ACTIVITY      RV 6 M, SOONER PRN      Recent Results (from the past 672 hour(s))   Lipid panel    Collection Time: 08/08/22  7:55 AM   Result Value Ref Range    Cholesterol, Total 220 (H) 100 - 199 mg/dL    Triglycerides 46 0 - 149 mg/dL     >39 mg/dL    VLDL Cholesterol Calculated 8 5 - 40 mg/dL    LDL Calculated 105 (H) 0 - 99 mg/dL    T   Chol/HDL Ratio 2 1 0 0 - 4 4 ratio   Comprehensive metabolic panel    Collection Time: 08/08/22  7:55 AM   Result Value Ref Range    Glucose, Random 85 65 - 99 mg/dL    BUN 27 8 - 27 mg/dL    Creatinine 0 57 0 57 - 1 00 mg/dL    eGFR 98 >59 mL/min/1 73    SL AMB BUN/CREATININE RATIO 47 (H) 12 - 28    Sodium 144 134 - 144 mmol/L    Potassium 5 3 (H) 3 5 - 5 2 mmol/L    Chloride 105 96 - 106 mmol/L    CO2 25 20 - 29 mmol/L    CALCIUM 9 8 8 7 - 10 3 mg/dL    Protein, Total 6 2 6 0 - 8 5 g/dL    Albumin 4 5 3 8 - 4 8 g/dL    Globulin, Total 1 7 1 5 - 4 5 g/dL    Albumin/Globulin Ratio 2 6 (H) 1 2 - 2 2    TOTAL BILIRUBIN 0 4 0 0 - 1 2 mg/dL    Alk Phos Isoenzymes 71 44 - 121 IU/L    AST 20 0 - 40 IU/L    ALT 14 0 - 32 IU/L

## 2022-09-29 ENCOUNTER — IMMUNIZATIONS (OUTPATIENT)
Dept: FAMILY MEDICINE CLINIC | Facility: CLINIC | Age: 72
End: 2022-09-29
Payer: COMMERCIAL

## 2022-09-29 DIAGNOSIS — Z23 ENCOUNTER FOR IMMUNIZATION: Primary | ICD-10-CM

## 2022-09-29 PROCEDURE — 90662 IIV NO PRSV INCREASED AG IM: CPT

## 2022-09-29 PROCEDURE — G0008 ADMIN INFLUENZA VIRUS VAC: HCPCS

## 2022-10-12 PROBLEM — Z00.00 MEDICARE ANNUAL WELLNESS VISIT, SUBSEQUENT: Status: RESOLVED | Noted: 2021-01-08 | Resolved: 2022-10-12

## 2022-11-28 ENCOUNTER — OFFICE VISIT (OUTPATIENT)
Dept: FAMILY MEDICINE CLINIC | Facility: CLINIC | Age: 72
End: 2022-11-28

## 2022-11-28 VITALS
OXYGEN SATURATION: 98 % | RESPIRATION RATE: 16 BRPM | DIASTOLIC BLOOD PRESSURE: 80 MMHG | HEART RATE: 61 BPM | TEMPERATURE: 97.2 F | SYSTOLIC BLOOD PRESSURE: 120 MMHG | HEIGHT: 60 IN | BODY MASS INDEX: 31.61 KG/M2 | WEIGHT: 161 LBS

## 2022-11-28 DIAGNOSIS — Z79.01 CHRONIC ANTICOAGULATION: ICD-10-CM

## 2022-11-28 DIAGNOSIS — S90.31XA CONTUSION OF RIGHT FOOT, INITIAL ENCOUNTER: ICD-10-CM

## 2022-11-28 DIAGNOSIS — M79.671 RIGHT FOOT PAIN: Primary | ICD-10-CM

## 2022-11-28 PROBLEM — Z23 NEED FOR INFLUENZA VACCINATION: Status: RESOLVED | Noted: 2021-10-14 | Resolved: 2022-11-28

## 2022-11-28 NOTE — PROGRESS NOTES
Name: Hillary Brandon      : 1950      MRN: [id number]  Encounter Provider: Jorge Carlson MD  Encounter Date: 2022   Encounter department: [address] [place] [address]     1  Right foot pain    2  Contusion of right foot, initial encounter    3  Chronic anticoagulation      Depression Screening and Follow-up Plan: Patient was screened for depression during today's encounter  They screened negative with a PHQ-2 score of 0  Subjective      WAS WALKING IN HEELS SEVERAL DAYS AGO - ALL DAY  NOTED INCREASED SWELLING AND BRUISING OF FOOT  MILD DISCOMFORT THEN - NONE NOW    GETTING BETTER SLOWLY    TAKING ELIQUIS BID FOR PAFIB    Review of Systems   Constitutional: Negative for fever  HENT: Negative for congestion and sore throat  Eyes: Negative for discharge  Respiratory: Negative for chest tightness  Cardiovascular: Negative for chest pain and palpitations  Gastrointestinal: Negative for abdominal pain, diarrhea, nausea and vomiting  Musculoskeletal: Negative for arthralgias, gout, joint swelling and stiffness  Skin: Positive for color change  Negative for itching  Neurological: Negative for tingling and numbness  Psychiatric/Behavioral: The patient is not nervous/anxious          Current Outpatient Medications on File Prior to Visit   Medication Sig   • Calcium Carbonate-Vitamin D (CALCIUM 600 +D HIGH POTENCY PO)    • Cholecalciferol 2000 units CAPS Take 1 capsule by mouth   • Eliquis 5 MG 5 mg 2 (two) times a day    • loratadine (CLARITIN) 10 mg tablet Take 10 mg by mouth daily as needed     • losartan (COZAAR) 100 MG tablet Take 1 tablet (100 mg total) by mouth daily   • Multiple Vitamins-Minerals (OCUVITE ADULT FORMULA PO) Take by mouth daily   • Naproxen Sodium 220 MG CAPS Take by mouth daily as needed   • Omega-3 Fatty Acids (Fish Oil) 1000 MG CPDR Take by mouth   • pantoprazole (PROTONIX) 40 mg tablet Take 1 tablet by mouth Daily   • PREMARIN vaginal cream Insert into the vagina 2 (two) times a week    • EPINEPHrine (EPIPEN) 0 3 mg/0 3 mL SOAJ Inject 0 3 mL (0 3 mg total) into a muscle once for 1 dose       Objective     Pulse 61   Temp (!) 97 2 °F (36 2 °C) (Temporal)   Resp 16   Ht 5' 0 05" (1 525 m)   Wt 73 kg (161 lb)   SpO2 98%   BMI 31 39 kg/m²     Physical Exam     R FOOT  MEDIAL SWELLING AND ECCHYMOSIS OF 1 ST MCT JOINT  NO TENDERNESS  FROM WITHOUT TENDERNESS  NO ALTERATION OF SENSATION  Sherrie Max MD

## 2022-12-30 DIAGNOSIS — K21.9 GASTROESOPHAGEAL REFLUX DISEASE WITHOUT ESOPHAGITIS: ICD-10-CM

## 2022-12-30 DIAGNOSIS — Z00.00 MEDICARE ANNUAL WELLNESS VISIT, SUBSEQUENT: Primary | ICD-10-CM

## 2022-12-30 DIAGNOSIS — Z13.220 SCREENING FOR LIPID DISORDERS: ICD-10-CM

## 2022-12-30 DIAGNOSIS — I10 PRIMARY HYPERTENSION: ICD-10-CM

## 2022-12-30 DIAGNOSIS — E04.1 THYROID NODULE: ICD-10-CM

## 2023-01-05 ENCOUNTER — OFFICE VISIT (OUTPATIENT)
Dept: FAMILY MEDICINE CLINIC | Facility: CLINIC | Age: 73
End: 2023-01-05

## 2023-01-05 VITALS
SYSTOLIC BLOOD PRESSURE: 128 MMHG | DIASTOLIC BLOOD PRESSURE: 72 MMHG | TEMPERATURE: 98 F | HEIGHT: 60 IN | HEART RATE: 65 BPM | OXYGEN SATURATION: 97 % | WEIGHT: 162 LBS | BODY MASS INDEX: 31.8 KG/M2 | RESPIRATION RATE: 12 BRPM

## 2023-01-05 DIAGNOSIS — B02.9 HERPES ZOSTER WITHOUT COMPLICATION: ICD-10-CM

## 2023-01-05 DIAGNOSIS — R21 RASH: Primary | ICD-10-CM

## 2023-01-05 RX ORDER — VALACYCLOVIR HYDROCHLORIDE 1 G/1
1000 TABLET, FILM COATED ORAL 3 TIMES DAILY
Qty: 21 TABLET | Refills: 0 | Status: SHIPPED | OUTPATIENT
Start: 2023-01-05 | End: 2023-01-12

## 2023-01-05 RX ORDER — PREDNISONE 20 MG/1
40 TABLET ORAL DAILY
Qty: 8 TABLET | Refills: 0 | Status: SHIPPED | OUTPATIENT
Start: 2023-01-05 | End: 2023-01-09

## 2023-01-05 NOTE — PROGRESS NOTES
Name: Ender Valencia      : 1950      MRN: [id number]  Encounter Provider: Jony Eastman MD  Encounter Date: 2023   Encounter department: [address] [address]  Rash  -     valACYclovir (VALTREX) 1,000 mg tablet; Take 1 tablet (1,000 mg total) by mouth 3 (three) times a day for 7 days  -     predniSONE 20 mg tablet; Take 2 tablets (40 mg total) by mouth daily for 4 days    2  Herpes zoster without complication  -     valACYclovir (VALTREX) 1,000 mg tablet; Take 1 tablet (1,000 mg total) by mouth 3 (three) times a day for 7 days  -     predniSONE 20 mg tablet; Take 2 tablets (40 mg total) by mouth daily for 4 days    BMI Counseling: Body mass index is 31 59 kg/m²  The BMI is above normal  Nutrition recommendations include encouraging healthy choices of fruits and vegetables and moderation in carbohydrate intake  Exercise recommendations include exercising 3-5 times per week  No pharmacotherapy was ordered  Rationale for BMI follow-up plan is due to patient being overweight or obese  Subjective      Rash  This is a new problem  The current episode started in the past 7 days  The problem has been gradually worsening since onset  The affected locations include the back and left arm  The rash is characterized by redness and pain  She was exposed to nothing  The rash first occurred at home  Associated symptoms include joint pain  Pertinent negatives include no anorexia, congestion, cough, decreased physical activity, decreased responsiveness, decreased sleep, drinking less, diarrhea, facial edema, fatigue, fever, itching, rhinorrhea, shortness of breath, sore throat or vomiting  Past treatments include nothing  Her past medical history is significant for varicella  There were no sick contacts  Review of Systems   Constitutional: Negative for decreased responsiveness, fatigue and fever  HENT: Negative for congestion, rhinorrhea and sore throat  Eyes: Negative for discharge  Respiratory: Negative for cough, chest tightness and shortness of breath  Cardiovascular: Negative for chest pain and palpitations  Gastrointestinal: Negative for abdominal pain, anorexia, diarrhea, nausea and vomiting  Musculoskeletal: Positive for joint pain  Negative for arthralgias and joint swelling  Skin: Positive for rash  Negative for itching  Neurological: Negative for numbness  Psychiatric/Behavioral: The patient is not nervous/anxious  Current Outpatient Medications on File Prior to Visit   Medication Sig   • Calcium Carbonate-Vitamin D (CALCIUM 600 +D HIGH POTENCY PO)    • Cholecalciferol 2000 units CAPS Take 1 capsule by mouth   • Eliquis 5 MG 5 mg 2 (two) times a day    • EPINEPHrine (EPIPEN) 0 3 mg/0 3 mL SOAJ Inject 0 3 mL (0 3 mg total) into a muscle once for 1 dose   • loratadine (CLARITIN) 10 mg tablet Take 10 mg by mouth daily as needed     • losartan (COZAAR) 100 MG tablet Take 1 tablet (100 mg total) by mouth daily   • Multiple Vitamins-Minerals (OCUVITE ADULT FORMULA PO) Take by mouth daily   • Naproxen Sodium 220 MG CAPS Take by mouth daily as needed   • Omega-3 Fatty Acids (Fish Oil) 1000 MG CPDR Take by mouth   • pantoprazole (PROTONIX) 40 mg tablet Take 1 tablet by mouth Daily   • PREMARIN vaginal cream Insert into the vagina 2 (two) times a week        Objective     /72   Pulse 65   Temp 98 °F (36 7 °C) (Temporal)   Resp 12   Ht 5' 0 05" (1 525 m)   Wt 73 5 kg (162 lb)   SpO2 97%   BMI 31 59 kg/m²     Physical Exam  Constitutional:       Appearance: She is well-developed  HENT:      Head: Normocephalic and atraumatic  Eyes:      General:         Right eye: No discharge  Left eye: No discharge  Conjunctiva/sclera: Conjunctivae normal       Pupils: Pupils are equal, round, and reactive to light  Neck:      Thyroid: No thyromegaly  Cardiovascular:      Rate and Rhythm: Normal rate and regular rhythm  Heart sounds: Normal heart sounds  No murmur heard  Pulmonary:      Effort: Pulmonary effort is normal  No respiratory distress  Breath sounds: Normal breath sounds  No wheezing or rales  Abdominal:      General: Bowel sounds are normal       Palpations: Abdomen is soft  Tenderness: There is no abdominal tenderness  Musculoskeletal:         General: No tenderness  Normal range of motion  Cervical back: Normal range of motion and neck supple  Lymphadenopathy:      Cervical: No cervical adenopathy  Skin:     General: Skin is warm and dry  Findings: Rash present  No erythema  Comments: RASH L SUPRASCAPULAR REGION  L ARM AREAS OF ERYTHEMA    CONSISTENT WITH HERPES ZOSTER   Neurological:      Mental Status: She is alert and oriented to person, place, and time  Psychiatric:         Behavior: Behavior normal          Thought Content:  Thought content normal          Judgment: Judgment normal        Chapis Dash MD

## 2023-01-10 ENCOUNTER — HOSPITAL ENCOUNTER (EMERGENCY)
Facility: HOSPITAL | Age: 73
Discharge: HOME | End: 2023-01-10
Attending: EMERGENCY MEDICINE

## 2023-01-10 ENCOUNTER — HOSPITAL ENCOUNTER (OUTPATIENT)
Dept: RADIOLOGY | Facility: HOSPITAL | Age: 73
Discharge: HOME | End: 2023-01-10

## 2023-01-10 ENCOUNTER — APPOINTMENT (EMERGENCY)
Dept: RADIOLOGY | Facility: HOSPITAL | Age: 73
End: 2023-01-10

## 2023-01-10 ENCOUNTER — TELEPHONE (OUTPATIENT)
Dept: RADIOLOGY | Facility: HOSPITAL | Age: 73
End: 2023-01-10

## 2023-01-10 VITALS
HEART RATE: 70 BPM | SYSTOLIC BLOOD PRESSURE: 183 MMHG | TEMPERATURE: 98.6 F | DIASTOLIC BLOOD PRESSURE: 93 MMHG | OXYGEN SATURATION: 98 % | RESPIRATION RATE: 17 BRPM

## 2023-01-10 DIAGNOSIS — R00.2 HEART PALPITATIONS: Primary | ICD-10-CM

## 2023-01-10 DIAGNOSIS — E04.1 THYROID NODULE: ICD-10-CM

## 2023-01-10 DIAGNOSIS — I10 HIGH BLOOD PRESSURE: ICD-10-CM

## 2023-01-10 LAB
2HR DELTA HS TROPONIN: 1 NG/L
ALBUMIN SERPL BCP-MCNC: 3.7 G/DL (ref 3.5–5)
ALP SERPL-CCNC: 64 U/L (ref 46–116)
ALT SERPL W P-5'-P-CCNC: 24 U/L (ref 12–78)
ANION GAP SERPL CALCULATED.3IONS-SCNC: 6 MMOL/L (ref 4–13)
AST SERPL W P-5'-P-CCNC: 22 U/L (ref 5–45)
ATRIAL RATE: 68 BPM
BASOPHILS # BLD AUTO: 0.04 THOUSANDS/ÂΜL (ref 0–0.1)
BASOPHILS NFR BLD AUTO: 1 % (ref 0–1)
BILIRUB SERPL-MCNC: 0.36 MG/DL (ref 0.2–1)
BUN SERPL-MCNC: 19 MG/DL (ref 5–25)
CALCIUM SERPL-MCNC: 9.6 MG/DL (ref 8.3–10.1)
CARDIAC TROPONIN I PNL SERPL HS: 4 NG/L
CARDIAC TROPONIN I PNL SERPL HS: 5 NG/L
CHLORIDE SERPL-SCNC: 103 MMOL/L (ref 96–108)
CO2 SERPL-SCNC: 30 MMOL/L (ref 21–32)
CREAT SERPL-MCNC: 0.62 MG/DL (ref 0.6–1.3)
EOSINOPHIL # BLD AUTO: 0.04 THOUSAND/ÂΜL (ref 0–0.61)
EOSINOPHIL NFR BLD AUTO: 1 % (ref 0–6)
ERYTHROCYTE [DISTWIDTH] IN BLOOD BY AUTOMATED COUNT: 13.7 % (ref 11.6–15.1)
GFR SERPL CREATININE-BSD FRML MDRD: 91 ML/MIN/1.73SQ M
GLUCOSE SERPL-MCNC: 86 MG/DL (ref 65–140)
HCT VFR BLD AUTO: 39.9 % (ref 34.8–46.1)
HGB BLD-MCNC: 12.9 G/DL (ref 11.5–15.4)
IMM GRANULOCYTES # BLD AUTO: 0.03 THOUSAND/UL (ref 0–0.2)
IMM GRANULOCYTES NFR BLD AUTO: 0 % (ref 0–2)
LYMPHOCYTES # BLD AUTO: 3.21 THOUSANDS/ÂΜL (ref 0.6–4.47)
LYMPHOCYTES NFR BLD AUTO: 36 % (ref 14–44)
MCH RBC QN AUTO: 28.1 PG (ref 26.8–34.3)
MCHC RBC AUTO-ENTMCNC: 32.3 G/DL (ref 31.4–37.4)
MCV RBC AUTO: 87 FL (ref 82–98)
MONOCYTES # BLD AUTO: 0.87 THOUSAND/ÂΜL (ref 0.17–1.22)
MONOCYTES NFR BLD AUTO: 10 % (ref 4–12)
NEUTROPHILS # BLD AUTO: 4.64 THOUSANDS/ÂΜL (ref 1.85–7.62)
NEUTS SEG NFR BLD AUTO: 52 % (ref 43–75)
NRBC BLD AUTO-RTO: 0 /100 WBCS
P AXIS: 67 DEGREES
PLATELET # BLD AUTO: 249 THOUSANDS/UL (ref 149–390)
PMV BLD AUTO: 10.4 FL (ref 8.9–12.7)
POTASSIUM SERPL-SCNC: 3.7 MMOL/L (ref 3.5–5.3)
PR INTERVAL: 196 MS
PROT SERPL-MCNC: 6.8 G/DL (ref 6.4–8.4)
QRS AXIS: 53 DEGREES
QRSD INTERVAL: 80 MS
QT INTERVAL: 390 MS
QTC INTERVAL: 414 MS
RBC # BLD AUTO: 4.59 MILLION/UL (ref 3.81–5.12)
SODIUM SERPL-SCNC: 139 MMOL/L (ref 135–147)
T WAVE AXIS: 64 DEGREES
TSH SERPL DL<=0.05 MIU/L-ACNC: 3.03 UIU/ML (ref 0.45–4.5)
VENTRICULAR RATE: 68 BPM
WBC # BLD AUTO: 8.83 THOUSAND/UL (ref 4.31–10.16)

## 2023-01-10 RX ORDER — LIDOCAINE HYDROCHLORIDE 10 MG/ML
5 INJECTION, SOLUTION EPIDURAL; INFILTRATION; INTRACAUDAL; PERINEURAL ONCE
Status: COMPLETED | OUTPATIENT
Start: 2023-01-10 | End: 2023-01-10

## 2023-01-10 RX ADMIN — SODIUM CHLORIDE 1000 ML: 0.9 INJECTION, SOLUTION INTRAVENOUS at 10:11

## 2023-01-10 RX ADMIN — LIDOCAINE HYDROCHLORIDE 2 ML: 10 INJECTION, SOLUTION EPIDURAL; INFILTRATION; INTRACAUDAL; PERINEURAL at 08:52

## 2023-01-10 NOTE — ED PROVIDER NOTES
History  Chief Complaint   Patient presents with   • Hypertension   • Palpitations     Pt was getting a biopsy of a thyroid nodule had an episode of rapid heart rate  Pt hypertensive      Patient has a history of intermittent SVT and paroxysmal atrial fibrillation  She is maintained on Eliquis  Patient was having a procedure today on her thyroid, during the procedure she felt a thumping in her chest   Patient states it felt like SVT  She noticed more pronounced heartbeat although she was not tachycardic  The procedure was stopped and the patient was sent for evaluation  Patient is not having palpitations at the time of exam   Heart rate is normal   Blood pressure remains elevated particularly the systolic  She denies chest pain          Prior to Admission Medications   Prescriptions Last Dose Informant Patient Reported? Taking?    Calcium Carbonate-Vitamin D (CALCIUM 600 +D HIGH POTENCY PO)   Yes No   Cholecalciferol 2000 units CAPS   Yes No   Sig: Take 1 capsule by mouth   EPINEPHrine (EPIPEN) 0 3 mg/0 3 mL SOAJ   No No   Sig: Inject 0 3 mL (0 3 mg total) into a muscle once for 1 dose   Eliquis 5 MG   Yes No   Si mg 2 (two) times a day    Multiple Vitamins-Minerals (OCUVITE ADULT FORMULA PO)   Yes No   Sig: Take by mouth daily   Naproxen Sodium 220 MG CAPS   Yes No   Sig: Take by mouth daily as needed   Omega-3 Fatty Acids (Fish Oil) 1000 MG CPDR   Yes No   Sig: Take by mouth   PREMARIN vaginal cream   Yes No   Sig: Insert into the vagina 2 (two) times a week    loratadine (CLARITIN) 10 mg tablet   Yes No   Sig: Take 10 mg by mouth daily as needed     losartan (COZAAR) 100 MG tablet   No No   Sig: Take 1 tablet (100 mg total) by mouth daily   pantoprazole (PROTONIX) 40 mg tablet   Yes No   Sig: Take 1 tablet by mouth Daily   predniSONE 20 mg tablet   No No   Sig: Take 2 tablets (40 mg total) by mouth daily for 4 days   valACYclovir (VALTREX) 1,000 mg tablet   No No   Sig: Take 1 tablet (1,000 mg total) by mouth 3 (three) times a day for 7 days      Facility-Administered Medications: None       Past Medical History:   Diagnosis Date   • Arthritis    • Enthesopathy of lower extremity     last assessed 08/21/2015   • GERD (gastroesophageal reflux disease)    • High blood pressure    • History of basal cell cancer    • Ocular migraine    • SVT (supraventricular tachycardia) (HCC)        Past Surgical History:   Procedure Laterality Date   • CERVICAL DISCECTOMY  2018 [address]   • LUMBAR LAMINECTOMY  2000   • MOHS SURGERY  2017    Basal Cell, [facility]   • US GUIDED THYROID BIOPSY  1/10/2023       Family History   Problem Relation Age of Onset   • Arthritis Mother    • Cancer Mother    • Esophageal cancer Mother    • Lung cancer Father    • Cancer Father    • Hypertension Father    • Stroke Maternal Grandmother    • Heart disease Maternal Grandfather    • Substance Abuse Neg Hx    • Mental illness Neg Hx      I have reviewed and agree with the history as documented  E-Cigarette/Vaping   • E-Cigarette Use Never User      E-Cigarette/Vaping Substances   • Nicotine No    • THC No    • CBD No    • Flavoring No    • Other No    • Unknown No      Social History     Tobacco Use   • Smoking status: Never   • Smokeless tobacco: Never   Vaping Use   • Vaping Use: Never used   Substance Use Topics   • Alcohol use: Yes     Comment: occ   • Drug use: No       Review of Systems   Constitutional: Negative for chills and fever  HENT: Negative for congestion and sore throat  Eyes: Negative for visual disturbance  Respiratory: Negative for cough and shortness of breath  Cardiovascular: Positive for palpitations  Negative for chest pain and leg swelling  Gastrointestinal: Negative for abdominal pain  Genitourinary: Negative for dysuria  Musculoskeletal: Negative for back pain  Skin: Negative for color change and rash  Neurological: Negative for headaches  Hematological: Bruises/bleeds easily  Psychiatric/Behavioral: Negative for confusion  All other systems reviewed and are negative  Physical Exam  Physical Exam  Vitals and nursing note reviewed  Constitutional:       Appearance: Normal appearance  HENT:      Head: Normocephalic  Right Ear: External ear normal       Left Ear: External ear normal       Nose: Nose normal       Mouth/Throat:      Pharynx: Oropharynx is clear  Eyes:      Conjunctiva/sclera: Conjunctivae normal    Neck:      Comments: Band-Aid over the puncture site  There is no swelling or hematoma  Cardiovascular:      Rate and Rhythm: Normal rate and regular rhythm  Pulses: Normal pulses  Pulmonary:      Effort: Pulmonary effort is normal       Breath sounds: Normal breath sounds  Abdominal:      Palpations: Abdomen is soft  Tenderness: There is no abdominal tenderness  Musculoskeletal:         General: No tenderness  Normal range of motion  Cervical back: Normal range of motion and neck supple  Right lower leg: No edema  Left lower leg: No edema  Skin:     General: Skin is warm and dry  Capillary Refill: Capillary refill takes less than 2 seconds  Neurological:      General: No focal deficit present  Mental Status: She is alert and oriented to person, place, and time     Psychiatric:         Mood and Affect: Mood normal          Vital Signs  ED Triage Vitals [01/10/23 0930]   Temperature Pulse Respirations Blood Pressure SpO2   98 6 °F (37 °C) 69 20 (!) 209/88 99 %      Temp Source Heart Rate Source Patient Position - Orthostatic VS BP Location FiO2 (%)   Oral Monitor Lying Right arm --      Pain Score       --           Vitals:    01/10/23 1030 01/10/23 1100 01/10/23 1200 01/10/23 1230   BP: (!) 171/80 162/79 163/76 154/73   Pulse: 66 66 63 61   Patient Position - Orthostatic VS:             Visual Acuity      ED Medications  Medications   sodium chloride 0 9 % bolus 1,000 mL (0 mL Intravenous Stopped 1/10/23 1111) Diagnostic Studies  Results Reviewed     Procedure Component Value Units Date/Time    HS Troponin I 2hr [[id number]]  (Normal) Collected: 01/10/23 1208    Lab Status: Final result Specimen: Blood from Arm, Right Updated: 01/10/23 1248     hs TnI 2hr 5 ng/L      Delta 2hr hsTnI 1 ng/L     HS Troponin I 4hr [[id number]]     Lab Status: No result Specimen: Blood     HS Troponin 0hr (reflex protocol) [[id number]]  (Normal) Collected: 01/10/23 1011    Lab Status: Final result Specimen: Blood from Arm, Right Updated: 01/10/23 1128     hs TnI 0hr 4 ng/L     TSH [[id number]]  (Normal) Collected: 01/10/23 1011    Lab Status: Final result Specimen: Blood from Arm, Right Updated: 01/10/23 1108     TSH 3RD GENERATON 3 034 uIU/mL     Narrative:      Patients undergoing fluorescein dye angiography may retain small amounts of fluorescein in the body for 48-72 hours post procedure  Samples containing fluorescein can produce falsely depressed TSH values  If the patient had this procedure,a specimen should be resubmitted post fluorescein clearance        Comprehensive metabolic panel [[id number]] Collected: 01/10/23 1011    Lab Status: Final result Specimen: Blood from Arm, Right Updated: 01/10/23 1100     Sodium 139 mmol/L      Potassium 3 7 mmol/L      Chloride 103 mmol/L      CO2 30 mmol/L      ANION GAP 6 mmol/L      BUN 19 mg/dL      Creatinine 0 62 mg/dL      Glucose 86 mg/dL      Calcium 9 6 mg/dL      AST 22 U/L      ALT 24 U/L      Alkaline Phosphatase 64 U/L      Total Protein 6 8 g/dL      Albumin 3 7 g/dL      Total Bilirubin 0 36 mg/dL      eGFR 91 ml/min/1 73sq m     Narrative:      Roel guidelines for Chronic Kidney Disease (CKD):   •  Stage 1 with normal or high GFR (GFR > 90 mL/min/1 73 square meters)  •  Stage 2 Mild CKD (GFR = 60-89 mL/min/1 73 square meters)  •  Stage 3A Moderate CKD (GFR = 45-59 mL/min/1 73 square meters)  •  Stage 3B Moderate CKD (GFR = 30-44 mL/min/1 73 square meters)  •  Stage 4 Severe CKD (GFR = 15-29 mL/min/1 73 square meters)  •  Stage 5 End Stage CKD (GFR <15 mL/min/1 73 square meters)  Note: GFR calculation is accurate only with a steady state creatinine    CBC and differential [[id number]] Collected: 01/10/23 1011    Lab Status: Final result Specimen: Blood from Arm, Right Updated: 01/10/23 1019     WBC 8 83 Thousand/uL      RBC 4 59 Million/uL      Hemoglobin 12 9 g/dL      Hematocrit 39 9 %      MCV 87 fL      MCH 28 1 pg      MCHC 32 3 g/dL      RDW 13 7 %      MPV 10 4 fL      Platelets 195 Thousands/uL      nRBC 0 /100 WBCs      Neutrophils Relative 52 %      Immat GRANS % 0 %      Lymphocytes Relative 36 %      Monocytes Relative 10 %      Eosinophils Relative 1 %      Basophils Relative 1 %      Neutrophils Absolute 4 64 Thousands/µL      Immature Grans Absolute 0 03 Thousand/uL      Lymphocytes Absolute 3 21 Thousands/µL      Monocytes Absolute 0 87 Thousand/µL      Eosinophils Absolute 0 04 Thousand/µL      Basophils Absolute 0 04 Thousands/µL                  XR chest 1 view portable   Final Result by Jose Nicholas MD (01/10 1027)      No acute cardiopulmonary disease                    Workstation performed: [id number]                    Procedures  ECG 12 Lead Documentation Only    Date/Time: 1/10/2023 9:34 AM  Performed by: Lucina Cornell MD  Authorized by: Lucina Cornell MD     Indications / Diagnosis:  Palpitation  ECG reviewed by me, the ED Provider: yes    Patient location:  ED  Interpretation:     Interpretation: normal    Rate:     ECG rate:  68    ECG rate assessment: normal    Rhythm:     Rhythm: sinus rhythm    Ectopy:     Ectopy: none    QRS:     QRS axis:  Normal    QRS intervals:  Normal  Conduction:     Conduction: normal    ST segments:     ST segments:  Normal  T waves:     T waves: normal               ED Course                               SBIRT 20yo+    Flowsheet Row Most Recent Value   SBIRT (23 yo +)    In order to provide better care to our patients, we are screening all of our patients for alcohol and drug use  Would it be okay to ask you these screening questions? Yes Filed at: 01/10/2023 1309   Initial Alcohol Screen: US AUDIT-C     1  How often do you have a drink containing alcohol? 4 Filed at: 01/10/2023 1309   2  How many drinks containing alcohol do you have on a typical day you are drinking? 0 Filed at: 01/10/2023 1309   3a  Male UNDER 65: How often do you have five or more drinks on one occasion? 0 Filed at: 01/10/2023 1309   3b  FEMALE Any Age, or MALE 65+: How often do you have 4 or more drinks on one occassion? 0 Filed at: 01/10/2023 1309   Audit-C Score 4 Filed at: 01/10/2023 1309   LIZ: How many times in the past year have you    Used an illegal drug or used a prescription medication for non-medical reasons? Never Filed at: 01/10/2023 1309                    Medical Decision Making  Patient had episode of palpitations during a procedure today  She has a history of both SVT and A  fib  Patient is in a stabilized A  fib with a controlled rate  We will check cardiac profile, electrolytes, thyroid    Heart palpitations: acute illness or injury  High blood pressure: acute illness or injury  Amount and/or Complexity of Data Reviewed  Labs: ordered  Radiology: ordered  Disposition  Final diagnoses:   Heart palpitations   High blood pressure     Time reflects when diagnosis was documented in both MDM as applicable and the Disposition within this note     Time User Action Codes Description Comment    1/10/2023  1:22 PM Beth Cruz Add [R00 2] Heart palpitations     1/10/2023  1:22 PM Latonya Kramer 29 High blood pressure       ED Disposition     ED Disposition   Discharge    Condition   Stable    Date/Time   Tue Hernan 10, 2023  1:22 PM    Bem Rkp  97  discharge to home/self care                 Follow-up Information     Follow up With Specialties Details Why Contact Adalberto Silveira Sherlyn Bennett MD Family Medicine Schedule an appointment as soon as possible for a visit   [address] [place] [address]  [phone]            Patient's Medications   Discharge Prescriptions    No medications on file       No discharge procedures on file      PDMP Review       Value Time User    PDMP Reviewed  Yes 1/8/2021  9:47 AM Dameon Xavier MD          ED Provider  Electronically Signed by           Bennie Bennett MD  01/10/23 8793

## 2023-01-10 NOTE — PROGRESS NOTES
Ms Edin Lopez is a 82 y/o female with a PMH of A-Fib on Eliquis , HTN on losartan, and a self reported history of prior SVT  The patient presented today to undergo an ultrasound guided thyroid biopsy  Please see separate report for full procedure details  Mid way through the procedure the felt what she described as a racing heart rate that was lasting several minutes  She described it to be similar to her prior episodes of SVT in the past  The patient noted she stopped her Eliquis several days ago in anticipation of the the thyroid biopsy  She noted she has been taking her losartan daily, including this morning  The patient denied any additional symptoms such has SOB, headache, vision changes, or chest pain  At this time the procedure was stopped  Vital signs: HR 69, 167/81, 98% on RA  On exam: Heart rate was regular rate and rhythm  The patient was monitored over the next 20 minutes, since she felt her rapid rhythm had resolved spontaneously on its own  On repeat vitals:      2nd set 09:05am 167/82 HR 65, o2 sat 98% on room air     3rd set @9:15am, /90, HR 67, o2 sat 98% on room air    Despite the patient stating she was now asymptomatic, due to the episode of reported rapid rhythm, increasing BP, and the inability to perform a 12 lead EKG ,  I asked the patient to be evaluated by the ED  She was agreeable to be discharged from Radiology and evaluated by the ED  The patient was transported to the ED in stable condition  The above was reviewed with Dr Jairon Dior      [facility]

## 2023-01-10 NOTE — PROGRESS NOTES
Called in to assess patient per request of Sixto South PA-C as patient felt like she was experiencing episode of SVT (has history of SVT and atrisl fibrillation)  First set of vitals 08:55am /81, HR 72, o2 sat 98% on room air    2nd set 09:05am 167/82 HR 65, o2 sat 98% on room air    3rd set @9:15am, /90, HR 67, o2 sat 98% on room air    Called ER as patient's blood pressure is continuing to rise  Requested ER to triage patient for evaluation

## 2023-01-31 LAB
CHOLEST SERPL-MCNC: 198 MG/DL (ref 100–199)
CHOLEST/HDLC SERPL: 2.2 RATIO (ref 0–4.4)
HDLC SERPL-MCNC: 91 MG/DL
LDLC SERPL CALC-MCNC: 98 MG/DL (ref 0–99)
SL AMB VLDL CHOLESTEROL CALC: 9 MG/DL (ref 5–40)
TRIGL SERPL-MCNC: 47 MG/DL (ref 0–149)

## 2023-03-02 RX ORDER — DILTIAZEM HYDROCHLORIDE 120 MG/1
TABLET, FILM COATED ORAL
COMMUNITY
Start: 2023-01-11

## 2023-03-03 ENCOUNTER — OFFICE VISIT (OUTPATIENT)
Dept: FAMILY MEDICINE CLINIC | Facility: CLINIC | Age: 73
End: 2023-03-03

## 2023-03-03 VITALS
HEIGHT: 60 IN | WEIGHT: 168 LBS | HEART RATE: 55 BPM | TEMPERATURE: 97.8 F | SYSTOLIC BLOOD PRESSURE: 128 MMHG | DIASTOLIC BLOOD PRESSURE: 78 MMHG | BODY MASS INDEX: 32.98 KG/M2 | OXYGEN SATURATION: 97 % | RESPIRATION RATE: 12 BRPM

## 2023-03-03 DIAGNOSIS — I48.0 PAROXYSMAL ATRIAL FIBRILLATION (HCC): ICD-10-CM

## 2023-03-03 DIAGNOSIS — G99.2 STENOSIS OF CERVICAL SPINE WITH MYELOPATHY (HCC): ICD-10-CM

## 2023-03-03 DIAGNOSIS — M19.90 ARTHRITIS: ICD-10-CM

## 2023-03-03 DIAGNOSIS — K21.9 GASTROESOPHAGEAL REFLUX DISEASE WITHOUT ESOPHAGITIS: ICD-10-CM

## 2023-03-03 DIAGNOSIS — M85.89 OSTEOPENIA OF MULTIPLE SITES: ICD-10-CM

## 2023-03-03 DIAGNOSIS — I10 PRIMARY HYPERTENSION: Primary | ICD-10-CM

## 2023-03-03 DIAGNOSIS — M48.02 STENOSIS OF CERVICAL SPINE WITH MYELOPATHY (HCC): ICD-10-CM

## 2023-03-03 DIAGNOSIS — I47.1 SVT (SUPRAVENTRICULAR TACHYCARDIA) (HCC): ICD-10-CM

## 2023-03-03 DIAGNOSIS — Z00.00 MEDICARE ANNUAL WELLNESS VISIT, SUBSEQUENT: ICD-10-CM

## 2023-03-03 LAB
SL AMB  POCT GLUCOSE, UA: NORMAL
SL AMB LEUKOCYTE ESTERASE,UA: NORMAL
SL AMB POCT BILIRUBIN,UA: NORMAL
SL AMB POCT BLOOD,UA: NORMAL
SL AMB POCT CLARITY,UA: CLEAR
SL AMB POCT COLOR,UA: YELLOW
SL AMB POCT KETONES,UA: NORMAL
SL AMB POCT NITRITE,UA: NORMAL
SL AMB POCT PH,UA: 6
SL AMB POCT SPECIFIC GRAVITY,UA: 1.01
SL AMB POCT URINE PROTEIN: NORMAL
SL AMB POCT UROBILINOGEN: NORMAL

## 2023-03-03 NOTE — PROGRESS NOTES
Assessment and Plan:     Problem List Items Addressed This Visit        Digestive    Gastroesophageal reflux disease without esophagitis     STABLE  DENIES ANY CP, INDIGESTION, OR COUGH  NOTES NO MELENA OR HEMATOCHEZIA  NO HEMATEMESIS  COMPLIANT WITH MEDICATION  NO CONCERNS    - CONTINUE CURRENT TREATMENT PLAN  - MEDICATION AS PRESCRIBED  - AVOID CAFFEINE, ALCOHOL OR SMOKING              Cardiovascular and Mediastinum    Hypertension - Primary     STABLE  DENIES ANY CP, SOB, PALPITATIONS, OR HEADACHE  NOTES NO WATER RETENTION  COMPLIANT WITH MEDICATION  NO CONCERNS    - CONTINUE CURRENT TREATMENT PLAN  - MONITOR DIETARY SODIUM INTAKE  - ENCOURAGE PHYSICAL ACTIVITY  - RV 6 MONTHS           Relevant Medications    diltiazem (CARDIZEM) 120 MG tablet    SVT (supraventricular tachycardia) ([facility])     FOLLOWED BY CARDIOLOGY         Relevant Medications    diltiazem (CARDIZEM) 120 MG tablet    Paroxysmal atrial fibrillation (HCC)     STABLE  FOLLOWED BY CARDIOLOGY         Relevant Medications    diltiazem (CARDIZEM) 120 MG tablet       Nervous and Auditory    Stenosis of cervical spine with myelopathy ([facility])       Musculoskeletal and Integument    Arthritis     STABLE  DENIES ANY JOINT SWELLING OR REDNESS  JOINT STIFFNESS PRESENT  PAIN MANAGEMENT ADEQUATE    - CONTINUE CURRENT MANAGEMENT  - MEDICATION AS DIRECTED  - CALL / RETURN IF SYMPTOMS WORSEN           Osteopenia of multiple sites       Other    Medicare annual wellness visit, subsequent       Urinary Incontinence Plan of Care: counseling topics discussed: practice Kegel (pelvic floor strengthening) exercises  Preventive health issues were discussed with patient, and age appropriate screening tests were ordered as noted in patient's After Visit Summary  Personalized health advice and appropriate referrals for health education or preventive services given if needed, as noted in patient's After Visit Summary       History of Present Illness:     Patient presents for a Medicare Wellness Visit    PATIENT [address] AND ANNUAL EVALUATION OF PATIENT'S MEDICAL ISSUES    INDIVIDUAL MEDICAL ISSUES WITH THEIR CURRENT STATUS, ASSESSMENT AND PLANS ARE LISTED ABOVE         Patient Care Team:  Marian Garcia MD as PCP - General (Family Medicine)  Rafiq Edmond MD (Gynecology)  Celine Joseph MD (Ophthalmology)  Julia Albarado (Cardiology)  Eulogio Villalobos MD (Gastroenterology)  Clayton Law DO (Dermatology)  Val Musa MD (Neurosurgery)  Fang Lott DPM (Podiatry)     Review of Systems:     Review of Systems   Constitutional: Negative for chills, fatigue and fever  HENT: Negative for congestion, ear discharge, ear pain, mouth sores, postnasal drip, sore throat and trouble swallowing  Eyes: Negative for pain, discharge and visual disturbance  Respiratory: Negative for cough, shortness of breath and wheezing  Cardiovascular: Negative for chest pain, palpitations and leg swelling  Gastrointestinal: Negative for abdominal distention, abdominal pain, blood in stool, diarrhea and nausea  Endocrine: Negative for polydipsia, polyphagia and polyuria  Genitourinary: Negative for dysuria, frequency, hematuria and urgency  Musculoskeletal: Positive for arthralgias, neck pain and neck stiffness  Negative for gait problem and joint swelling  Skin: Negative for pallor and rash  Neurological: Negative for dizziness, syncope, speech difficulty, weakness, light-headedness, numbness and headaches  Hematological: Negative for adenopathy  Psychiatric/Behavioral: Negative for behavioral problems, confusion and sleep disturbance  The patient is not nervous/anxious           Problem List:     Patient Active Problem List   Diagnosis   • Thyroid nodule   • Arthritis   • Spinal stenosis of lumbar region with neurogenic claudication   • Enthesopathy of lower extremity   • Hypertension   • Osteoarthritis of cervical spine   • Osteoarthritis of left knee   • 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   • Gastroesophageal reflux disease without esophagitis   • Stenosis of cervical spine with myelopathy (HCC)   • SVT (supraventricular tachycardia) (HCC)   • Numbness and tingling of foot   • Medicare annual wellness visit, subsequent   • Paroxysmal atrial fibrillation (HCC)   • Myofascial muscle pain   • Encounter for screening mammogram for breast cancer   • Chronic anticoagulation   • Osteopenia of multiple sites      Past Medical and Surgical History:     Past Medical History:   Diagnosis Date   • Arthritis    • Enthesopathy of lower extremity     last assessed 08/21/2015   • GERD (gastroesophageal reflux disease)    • High blood pressure    • History of basal cell cancer    • Ocular migraine    • SVT (supraventricular tachycardia) (HCC)      Past Surgical History:   Procedure Laterality Date   • CERVICAL DISCECTOMY  2018 [address]   • LUMBAR LAMINECTOMY  2000   • MOHS SURGERY  2017    Basal Cell, Face-[facility]   • US GUIDED THYROID BIOPSY  1/10/2023      Family History:     Family History   Problem Relation Age of Onset   • Arthritis Mother    • Cancer Mother    • Esophageal cancer Mother    • Lung cancer Father    • Cancer Father    • Hypertension Father    • Stroke Maternal Grandmother    • Heart disease Maternal Grandfather    • Substance Abuse Neg Hx    • Mental illness Neg Hx       Social History:     Social History     Socioeconomic History   • Marital status:      Spouse name: None   • Number of children: None   • Years of education: None   • Highest education level: None   Occupational History   • None   Tobacco Use   • Smoking status: Never   • Smokeless tobacco: Never   Vaping Use   • Vaping Use: Never used   Substance and Sexual Activity   • Alcohol use: Yes     Comment: occ   • Drug use: No   • Sexual activity: None   Other Topics Concern   • None   Social History Narrative    Active advance directive    Caffeine use Dental care regularly    Drinks coffee     Social Determinants of Health     Financial Resource Strain: Low Risk    • Difficulty of Paying Living Expenses: Not very hard   Food Insecurity: Not on file   Transportation Needs: No Transportation Needs   • Lack of Transportation (Medical): No   • Lack of Transportation (Non-Medical): No   Physical Activity: Not on file   Stress: Not on file   Social Connections: Not on file   Intimate Partner Violence: Not on file   Housing Stability: Not on file      Medications and Allergies:     Current Outpatient Medications   Medication Sig Dispense Refill   • Eliquis 5 MG 5 mg 2 (two) times a day      • EPINEPHrine (EPIPEN) 0 3 mg/0 3 mL SOAJ Inject 0 3 mL (0 3 mg total) into a muscle once for 1 dose 0 6 mL 0   • loratadine (CLARITIN) 10 mg tablet Take 10 mg by mouth daily as needed       • losartan (COZAAR) 100 MG tablet Take 1 tablet (100 mg total) by mouth daily 90 tablet 1   • Multiple Vitamins-Minerals (OCUVITE ADULT FORMULA PO) Take by mouth daily     • Naproxen Sodium 220 MG CAPS Take by mouth daily as needed     • Omega-3 Fatty Acids (Fish Oil) 1000 MG CPDR Take by mouth     • pantoprazole (PROTONIX) 40 mg tablet Take 1 tablet by mouth Daily     • PREMARIN vaginal cream Insert into the vagina 2 (two) times a week      • Calcium Carbonate-Vitamin D (CALCIUM 600 +D HIGH POTENCY PO)  (Patient not taking: Reported on 3/3/2023)     • Cholecalciferol 2000 units CAPS Take 1 capsule by mouth (Patient not taking: Reported on 3/3/2023)     • diltiazem (CARDIZEM) 120 MG tablet PRN       No current facility-administered medications for this visit       Allergies   Allergen Reactions   • Other Anaphylaxis   • Amoxicillin GI Intolerance     Other reaction(s): Nausea and/or vomiting  Other reaction(s): Nausea and/or vomiting   • Amoxicillin-Pot Clavulanate GI Intolerance     Other reaction(s): Nausea and/or vomiting  AUGMENTIN   • Erythromycin GI Intolerance     Other reaction(s): Nausea and/or vomiting  [facility] - [id number]: vomiting  Other reaction(s): Nausea and/or vomiting  [facility] - [id number]: vomiting   • Sunflower Oil - Food Allergy Hives and Lip Swelling     And sunflower seeds      Immunizations:     Immunization History   Administered Date(s) Administered   • COVID-19 MODERNA VACC 0 5 ML IM 08/17/2021, 09/14/2021   • Influenza Split High Dose Preservative Free IM 10/04/2017   • Influenza, high dose seasonal 0 7 mL 09/19/2018, 10/24/2019, 09/17/2020, 10/14/2021, 09/29/2022   • Influenza, seasonal, injectable 09/01/2016   • Pneumococcal Conjugate 13-Valent 11/22/2017   • Pneumococcal Polysaccharide PPV23 10/02/2020   • TD (adult) Preservative Free 03/08/2015      Health Maintenance:         Topic Date Due   • Breast Cancer Screening: Mammogram  08/10/2023   • Cervical Cancer Screening  11/23/2023   • Colorectal Cancer Screening  04/23/2024   • Hepatitis C Screening  Addressed         Topic Date Due   • COVID-19 Vaccine (3 - Booster for Moderna series) 11/09/2021      Medicare Screening Tests and Risk Assessments:         Health Risk Assessment:   Patient rates overall health as good  Patient feels that their physical health rating is slightly worse  Patient is satisfied with their life  Eyesight was rated as same  Hearing was rated as same  Patient feels that their emotional and mental health rating is same  Patients states they are never, rarely angry  Patient states they are sometimes unusually tired/fatigued  Pain experienced in the last 7 days has been some  Patient's pain rating has been 2/10  Patient states that she has experienced no weight loss or gain in last 6 months  Fall Risk Screening: In the past year, patient has experienced: no history of falling in past year      Urinary Incontinence Screening:   Patient has leaked urine accidently in the last six months   Very small amount but rarely    Home Safety:  Patient does not have trouble with stairs inside or outside of their home  Patient has working smoke alarms and has working carbon monoxide detector  Home safety hazards include: poor household lighting  Nutrition:   Current diet is Regular, No Added Salt and Limited junk food  Medications:   Patient is currently taking over-the-counter supplements  OTC medications include: Vitamins D, calcium, fish oil, Ocuvite  Patient is able to manage medications  Activities of Daily Living (ADLs)/Instrumental Activities of Daily Living (IADLs):   Walk and transfer into and out of bed and chair?: Yes  Dress and groom yourself?: Yes    Bathe or shower yourself?: Yes    Feed yourself? Yes  Do your laundry/housekeeping?: Yes  Manage your money, pay your bills and track your expenses?: Yes  Make your own meals?: Yes    Do your own shopping?: Yes    Previous Hospitalizations:   Any hospitalizations or ED visits within the last 12 months?: Yes    How many hospitalizations have you had in the last year?: 1-2    Hospitalization Comments: Transported to ER during thyroid nodule biopsy due to SVT and elevation in BP      Advance Care Planning:   Living will: Yes    Durable POA for healthcare: No    Advanced directive: Yes    Provider agrees with end of life decisions: Yes      Cognitive Screening:   Provider or family/friend/caregiver concerned regarding cognition?: No    PREVENTIVE SCREENINGS      Cardiovascular Screening:    General: Screening Current      Diabetes Screening:     General: Screening Current      Colorectal Cancer Screening:     General: Screening Current      Breast Cancer Screening:     General: Screening Current      Cervical Cancer Screening:    General: Screening Not Indicated      Osteoporosis Screening:    General: Screening Current      Abdominal Aortic Aneurysm (AAA) Screening:        General: Screening Not Indicated      Lung Cancer Screening:     General: Screening Not Indicated      Hepatitis C Screening:    General: Screening Current    Screening, Brief Intervention, and Referral to Treatment (SBIRT)    Screening  Typical number of drinks in a day: 0  Typical number of drinks in a week: 0  Interpretation: Low risk drinking behavior  AUDIT-C Screenin) How often did you have a drink containing alcohol in the past year? monthly or less  2) How many drinks did you have on a typical day when you were drinking in the past year? 0  3) How often did you have 6 or more drinks on one occasion in the past year? never    AUDIT-C Score: 1  Interpretation: Score 0-2 (female): Negative screen for alcohol misuse    Single Item Drug Screening:  How often have you used an illegal drug (including marijuana) or a prescription medication for non-medical reasons in the past year? never    Single Item Drug Screen Score: 0  Interpretation: Negative screen for possible drug use disorder    No results found  Physical Exam:     /78   Pulse 55   Temp 97 8 °F (36 6 °C) (Temporal)   Resp 12   Ht 5' 0 05" (1 525 m)   Wt 76 2 kg (168 lb)   SpO2 97%   BMI 32 76 kg/m²     Physical Exam  Constitutional:       General: She is not in acute distress  Appearance: Normal appearance  She is well-developed  She is not ill-appearing  HENT:      Head: Normocephalic and atraumatic  Right Ear: Tympanic membrane and external ear normal       Left Ear: Tympanic membrane and external ear normal       Nose: Nose normal       Mouth/Throat:      Pharynx: No oropharyngeal exudate  Eyes:      General: No scleral icterus  Right eye: No discharge  Left eye: No discharge  Conjunctiva/sclera: Conjunctivae normal       Pupils: Pupils are equal, round, and reactive to light  Neck:      Thyroid: No thyromegaly  Trachea: No tracheal deviation  Cardiovascular:      Rate and Rhythm: Normal rate and regular rhythm  Heart sounds: Normal heart sounds  No murmur heard  No friction rub  No gallop     Pulmonary:      Effort: Pulmonary effort is normal  No respiratory distress  Breath sounds: Normal breath sounds  No wheezing or rales  Chest:      Chest wall: No tenderness  Abdominal:      General: Bowel sounds are normal  There is no distension  Palpations: Abdomen is soft  There is no mass  Tenderness: There is no abdominal tenderness  There is no guarding or rebound  Hernia: No hernia is present  Musculoskeletal:         General: No tenderness or deformity  Normal range of motion  Cervical back: Normal range of motion and neck supple  Comments: MODERATE DJD CHANGES     Lymphadenopathy:      Cervical: No cervical adenopathy  Skin:     General: Skin is warm and dry  Findings: No erythema or rash  Neurological:      Mental Status: She is alert and oriented to person, place, and time  Cranial Nerves: No cranial nerve deficit  Motor: No abnormal muscle tone  Coordination: Coordination normal       Deep Tendon Reflexes: Reflexes normal    Psychiatric:         Behavior: Behavior normal          Thought Content:  Thought content normal          Judgment: Judgment normal           Dionicio Ridley MD

## 2023-03-03 NOTE — LETTER
mario a      Current Outpatient Medications:   •  Eliquis 5 MG, 5 mg 2 (two) times a day , Disp: , Rfl:   •  EPINEPHrine (EPIPEN) 0 3 mg/0 3 mL SOAJ, Inject 0 3 mL (0 3 mg total) into a muscle once for 1 dose, Disp: 0 6 mL, Rfl: 0  •  loratadine (CLARITIN) 10 mg tablet, Take 10 mg by mouth daily as needed  , Disp: , Rfl:   •  losartan (COZAAR) 100 MG tablet, Take 1 tablet (100 mg total) by mouth daily, Disp: 90 tablet, Rfl: 1  •  Multiple Vitamins-Minerals (OCUVITE ADULT FORMULA PO), Take by mouth daily, Disp: , Rfl:   •  Naproxen Sodium 220 MG CAPS, Take by mouth daily as needed, Disp: , Rfl:   •  Omega-3 Fatty Acids (Fish Oil) 1000 MG CPDR, Take by mouth, Disp: , Rfl:   •  pantoprazole (PROTONIX) 40 mg tablet, Take 1 tablet by mouth Daily, Disp: , Rfl:   •  PREMARIN vaginal cream, Insert into the vagina 2 (two) times a week , Disp: , Rfl:   •  Calcium Carbonate-Vitamin D (CALCIUM 600 +D HIGH POTENCY PO), , Disp: , Rfl:   •  Cholecalciferol 2000 units CAPS, Take 1 capsule by mouth (Patient not taking: Reported on 3/3/2023), Disp: , Rfl:   •  diltiazem (CARDIZEM) 120 MG tablet, PRN, Disp: , Rfl:       Recent Results (from the past 2688 hour(s))   Fine Needle Aspiration    Collection Time: 01/10/23  8:52 AM   Result Value Ref Range    Case Report       Non-gynecologic Cytology                          Case: [id number]                                  Authorizing Provider:  KADI Garces          Collected:           01/10/2023 3444              Ordering Location:     [address] [place] [address] Received:            01/10/2023 4534                                     Ultrasound                                                                   Pathologist:           Chelo Perez MD                                                    Specimens:   A) - Thyroid, Left, mid                                                                             B) - Thyroid, Left, mid Final Diagnosis       A & B  Thyroid, Left, mid: (Thin-Prep and smears)  Benign ([place] Category II) - See note  Follicular cells with occasional crowding and overlap, macrophages, lymphocytes, and colloid  Satisfactory for evaluation  Note: As reported in the [facility] for Reporting Thyroid Cytopathology*, the diagnostic category of "benign/negative for malignancy" carries a 0-3% risk of malignancy being found in subsequent resections (in the few studies of patients with benign FNA results that were followed long-term, a false negative rate of 0-3% was reported), with the usual management being clinical follow-up supplemented by ultrasonography (US) as indicated  Repeat FNA may be indicated for nodules showing significant growth or developing US abnormalities  Ultimately, clinical/imaging correlation for this patient is needed in arriving at the actual management plan  *The [place] System for Reporting Thyroid Cytopathology, Raúl Anderson ), 2018 (2nd ed )       Intraoperative Consultation       B  Satisfactory  Dr Domitila Morgan, Interpretation performed at [facility], [address] [place] [address]          Gross Description       A   20mL, colorless, clear, received in CytoLyt     B   6 slides received (3 alcohol, 3 diff quik)         Clinical Information       Size: 1 7x0 9x1 2cm Margins: irregular Echogenicity: solid, cystic Microcalcs: present Flow: peripheral  Size change: N/A Suspicion level: intermediate Hx of Hashimoto's Thyroiditis: N/A      Additional Information       [employer]'s FDA approved ,  and ThinPrep Imaging Duo System are utilized with strict adherence to the 's instruction manual to prepare gynecologic and non-gynecologic cytology specimens for the production of ThinPrep slides as well as for gynecologic ThinPrep imaging   These processes have been validated by our laboratory and/or by the   These tests were developed and their performance characteristics determined by [facility] or [facility]  They may not be cleared or approved by the [place]  Food and Drug Administration  The FDA has determined that such clearance or approval is not necessary  These tests are used for clinical purposes  They should not be regarded as investigational or for research  This laboratory has been approved by [place] [address], designated as a high-complexity laboratory and is qualified to perform these tests      Interpretation performed at [facility], [address] [facility] [address]     ECG 12 lead    Collection Time: 01/10/23  9:34 AM   Result Value Ref Range    Ventricular Rate 68 BPM    Atrial Rate 68 BPM    IA Interval 196 ms    QRSD Interval 80 ms    QT Interval 390 ms    QTC Interval 414 ms    P [place] 67 degrees    QRS Axis 53 degrees    T Wave Axis 64 degrees   CBC and differential    Collection Time: 01/10/23 10:11 AM   Result Value Ref Range    WBC 8 83 4 31 - 10 16 Thousand/uL    RBC 4 59 3 81 - 5 12 Million/uL    Hemoglobin 12 9 11 5 - 15 4 g/dL    Hematocrit 39 9 34 8 - 46 1 %    MCV 87 82 - 98 fL    MCH 28 1 26 8 - 34 3 pg    MCHC 32 3 31 4 - 37 4 g/dL    RDW 13 7 11 6 - 15 1 %    MPV 10 4 8 9 - 12 7 fL    Platelets 988 967 - 679 Thousands/uL    nRBC 0 /100 WBCs    Neutrophils Relative 52 43 - 75 %    Immat GRANS % 0 0 - 2 %    Lymphocytes Relative 36 14 - 44 %    Monocytes Relative 10 4 - 12 %    Eosinophils Relative 1 0 - 6 %    Basophils Relative 1 0 - 1 %    Neutrophils Absolute 4 64 1 85 - 7 62 Thousands/µL    Immature Grans Absolute 0 03 0 00 - 0 20 Thousand/uL    Lymphocytes Absolute 3 21 0 60 - 4 47 Thousands/µL    Monocytes Absolute 0 87 0 17 - 1 22 Thousand/µL    Eosinophils Absolute 0 04 0 00 - 0 61 Thousand/µL    Basophils Absolute 0 04 0 00 - 0 10 Thousands/µL   Comprehensive metabolic panel    Collection Time: 01/10/23 10:11 AM   Result Value Ref Range    Sodium 139 135 - 147 mmol/L    Potassium 3 7 3 5 - 5 3 mmol/L    Chloride 103 96 - 108 mmol/L    CO2 30 21 - 32 mmol/L    ANION GAP 6 4 - 13 mmol/L    BUN 19 5 - 25 mg/dL    Creatinine 0 62 0 60 - 1 30 mg/dL    Glucose 86 65 - 140 mg/dL    Calcium 9 6 8 3 - 10 1 mg/dL    AST 22 5 - 45 U/L    ALT 24 12 - 78 U/L    Alkaline Phosphatase 64 46 - 116 U/L    Total Protein 6 8 6 4 - 8 4 g/dL    Albumin 3 7 3 5 - 5 0 g/dL    Total Bilirubin 0 36 0 20 - 1 00 mg/dL    eGFR 91 ml/min/1 73sq m   TSH    Collection Time: 01/10/23 10:11 AM   Result Value Ref Range    TSH 3RD GENERATON 3 034 0 450 - 4 500 uIU/mL   HS Troponin 0hr (reflex protocol)    Collection Time: 01/10/23 10:11 AM   Result Value Ref Range    hs TnI 0hr 4 "Refer to ACS Flowchart"- see link ng/L   HS Troponin I 2hr    Collection Time: 01/10/23 12:08 PM   Result Value Ref Range    hs TnI 2hr 5 "Refer to ACS Flowchart"- see link ng/L    Delta 2hr hsTnI 1 <20 ng/L   TSH, 3rd generation with Free T4 reflex    Collection Time: 01/31/23  8:28 AM   Result Value Ref Range    TSH 2 930 0 450 - 4 500 uIU/mL   Basic metabolic panel    Collection Time: 01/31/23  8:28 AM   Result Value Ref Range    Glucose, Random 87 70 - 99 mg/dL    BUN 18 8 - 27 mg/dL    Creatinine 0 55 (L) 0 57 - 1 00 mg/dL    eGFR 99 >59 mL/min/1 73    SL AMB BUN/CREATININE RATIO 33 (H) 12 - 28    Sodium 141 134 - 144 mmol/L    Potassium 4 8 3 5 - 5 2 mmol/L    Chloride 104 96 - 106 mmol/L    CO2 24 20 - 29 mmol/L    CALCIUM 9 3 8 7 - 10 3 mg/dL   T4    Collection Time: 01/31/23  8:28 AM   Result Value Ref Range    T4, Total 6 1 4 5 - 12 0 ug/dL   T3, uptake    Collection Time: 01/31/23  8:28 AM   Result Value Ref Range    T3 Uptake Ratio 26 24 - 39 %    Free Thyroxine Index 1 6 1 2 - 4 9   PTH, intact    Collection Time: 01/31/23  8:28 AM   Result Value Ref Range    PTH, Intact 23 15 - 65 pg/mL   Vitamin D 25 hydroxy    Collection Time: 01/31/23  8:28 AM   Result Value Ref Range    25-HYDROXY VIT D 47 2 30 0 - 100 0 ng/mL   Lipid panel    Collection Time: 01/31/23  8:29 AM   Result Value Ref Range    Cholesterol, Total 198 100 - 199 mg/dL    Triglycerides 47 0 - 149 mg/dL    HDL 91 >39 mg/dL    VLDL Cholesterol Calculated 9 5 - 40 mg/dL    LDL Calculated 98 0 - 99 mg/dL    T   Chol/HDL Ratio 2 2 0 0 - 4 4 ratio

## 2023-03-03 NOTE — PATIENT INSTRUCTIONS
Medicare Preventive Visit Patient Instructions  Thank you for completing your Welcome to Medicare Visit or Medicare Annual Wellness Visit today  Your next wellness visit will be due in one year (3/3/2024)  The screening/preventive services that you may require over the next 5-10 years are detailed below  Some tests may not apply to you based off risk factors and/or age  Screening tests ordered at today's visit but not completed yet may show as past due  Also, please note that scanned in results may not display below  Preventive Screenings:  Service Recommendations Previous Testing/Comments   Colorectal Cancer Screening  * Colonoscopy    * Fecal Occult Blood Test (FOBT)/Fecal Immunochemical Test (FIT)  * Fecal DNA/Cologuard Test  * Flexible Sigmoidoscopy Age: 39-70 years old   Colonoscopy: every 10 years (may be performed more frequently if at higher risk)  OR  FOBT/FIT: every 1 year  OR  Cologuard: every 3 years  OR  Sigmoidoscopy: every 5 years  Screening may be recommended earlier than age 39 if at higher risk for colorectal cancer  Also, an individualized decision between you and your healthcare provider will decide whether screening between the ages of 74-80 would be appropriate  Colonoscopy: 04/23/2014  FOBT/FIT: Not on file  Cologuard: Not on file  Sigmoidoscopy: Not on file    Screening Current     Breast Cancer Screening Age: 36 years old  Frequency: every 1-2 years  Not required if history of left and right mastectomy Mammogram: 08/10/2022    Screening Current   Cervical Cancer Screening Between the ages of 21-29, pap smear recommended once every 3 years  Between the ages of 33-67, can perform pap smear with HPV co-testing every 5 years     Recommendations may differ for women with a history of total hysterectomy, cervical cancer, or abnormal pap smears in past  Pap Smear: 11/23/2018    Screening Not Indicated   Hepatitis C Screening Once for adults born between 1945 and 1965  More frequently in patients at high risk for Hepatitis C Hep C Antibody: Not on file    Screening Current   Diabetes Screening 1-2 times per year if you're at risk for diabetes or have pre-diabetes Fasting glucose: No results in last 5 years (No results in last 5 years)  A1C: No results in last 5 years (No results in last 5 years)  Screening Current   Cholesterol Screening Once every 5 years if you don't have a lipid disorder  May order more often based on risk factors  Lipid panel: 01/31/2023    Screening Current     Other Preventive Screenings Covered by Medicare:  1  Abdominal Aortic Aneurysm (AAA) Screening: covered once if your at risk  You're considered to be at risk if you have a family history of AAA  2  Lung Cancer Screening: covers low dose CT scan once per year if you meet all of the following conditions: (1) Age 50-69; (2) No signs or symptoms of lung cancer; (3) Current smoker or have quit smoking within the last 15 years; (4) You have a tobacco smoking history of at least 20 pack years (packs per day multiplied by number of years you smoked); (5) You get a written order from a healthcare provider  3  Glaucoma Screening: covered annually if you're considered high risk: (1) You have diabetes OR (2) Family history of glaucoma OR (3)  aged 48 and older OR (3)  American aged 72 and older  3  Osteoporosis Screening: covered every 2 years if you meet one of the following conditions: (1) You're estrogen deficient and at risk for osteoporosis based off medical history and other findings; (2) Have a vertebral abnormality; (3) On glucocorticoid therapy for more than 3 months; (4) Have primary hyperparathyroidism; (5) On osteoporosis medications and need to assess response to drug therapy  · Last bone density test (DXA Scan): 08/10/2022   5  HIV Screening: covered annually if you're between the age of 15-65   Also covered annually if you are younger than 13 and older than 72 with risk factors for HIV infection  For pregnant patients, it is covered up to 3 times per pregnancy  Immunizations:  Immunization Recommendations   Influenza Vaccine Annual influenza vaccination during flu season is recommended for all persons aged >= 6 months who do not have contraindications   Pneumococcal Vaccine   * Pneumococcal conjugate vaccine = PCV13 (Prevnar 13), PCV15 (Vaxneuvance), PCV20 (Prevnar 20)  * Pneumococcal polysaccharide vaccine = PPSV23 (Pneumovax) Adults 25-60 years old: 1-3 doses may be recommended based on certain risk factors  Adults 72 years old: 1-2 doses may be recommended based off what pneumonia vaccine you previously received   Hepatitis B Vaccine 3 dose series if at intermediate or high risk (ex: diabetes, end stage renal disease, liver disease)   Tetanus (Td) Vaccine - COST NOT COVERED BY MEDICARE PART B Following completion of primary series, a booster dose should be given every 10 years to maintain immunity against tetanus  Td may also be given as tetanus wound prophylaxis  Tdap Vaccine - COST NOT COVERED BY MEDICARE PART B Recommended at least once for all adults  For pregnant patients, recommended with each pregnancy  Shingles Vaccine (Shingrix) - COST NOT COVERED BY MEDICARE PART B  2 shot series recommended in those aged 48 and above     Health Maintenance Due:      Topic Date Due   • Breast Cancer Screening: Mammogram  08/10/2023   • Cervical Cancer Screening  11/23/2023   • Colorectal Cancer Screening  04/23/2024   • Hepatitis C Screening  Addressed     Immunizations Due:      Topic Date Due   • COVID-19 Vaccine (3 - Booster for Moderna series) 11/09/2021     Advance Directives   What are advance directives? Advance directives are legal documents that state your wishes and plans for medical care  These plans are made ahead of time in case you lose your ability to make decisions for yourself   Advance directives can apply to any medical decision, such as the treatments you want, and if you want to donate organs  What are the types of advance directives? There are many types of advance directives, and each state has rules about how to use them  You may choose a combination of any of the following:  · Living will: This is a written record of the treatment you want  You can also choose which treatments you do not want, which to limit, and which to stop at a certain time  This includes surgery, medicine, IV fluid, and tube feedings  · Durable power of  for healthcare [facility] [place]): This is a written record that states who you want to make healthcare choices for you when you are unable to make them for yourself  This person, called a proxy, is usually a family member or a friend  You may choose more than 1 proxy  · Do not resuscitate (DNR) order:  A DNR order is used in case your heart stops beating or you stop breathing  It is a request not to have certain forms of treatment, such as CPR  A DNR order may be included in other types of advance directives  · Medical directive: This covers the care that you want if you are in a coma, near death, or unable to make decisions for yourself  You can list the treatments you want for each condition  Treatment may include pain medicine, surgery, blood transfusions, dialysis, IV or tube feedings, and a ventilator (breathing machine)  · Values history: This document has questions about your views, beliefs, and how you feel and think about life  This information can help others choose the care that you would choose  Why are advance directives important? An advance directive helps you control your care  Although spoken wishes may be used, it is better to have your wishes written down  Spoken wishes can be misunderstood, or not followed  Treatments may be given even if you do not want them  An advance directive may make it easier for your family to make difficult choices about your care     Urinary Incontinence   Urinary incontinence (UI)  is when you lose control of your bladder  UI develops because your bladder cannot store or empty urine properly  The 3 most common types of UI are stress incontinence, urge incontinence, or both  Medicines:   · May be given to help strengthen your bladder control  Report any side effects of medication to your healthcare provider  Do pelvic muscle exercises often:  Your pelvic muscles help you stop urinating  Squeeze these muscles tight for 5 seconds, then relax for 5 seconds  Gradually work up to squeezing for 10 seconds  Do 3 sets of 15 repetitions a day, or as directed  This will help strengthen your pelvic muscles and improve bladder control  Train your bladder:  Go to the bathroom at set times, such as every 2 hours, even if you do not feel the urge to go  You can also try to hold your urine when you feel the urge to go  For example, hold your urine for 5 minutes when you feel the urge to go  As that becomes easier, hold your urine for 10 minutes  Self-care:   · Keep a UI record  Write down how often you leak urine and how much you leak  Make a note of what you were doing when you leaked urine  · Drink liquids as directed  You may need to limit the amount of liquid you drink to help control your urine leakage  Do not drink any liquid right before you go to bed  Limit or do not have drinks that contain caffeine or alcohol  · Prevent constipation  Eat a variety of high-fiber foods  Good examples are high-fiber cereals, beans, vegetables, and whole-grain breads  Walking is the best way to trigger your intestines to have a bowel movement  · Exercise regularly and maintain a healthy weight  Weight loss and exercise will decrease pressure on your bladder and help you control your leakage  · Use a catheter as directed  to help empty your bladder  A catheter is a tiny, plastic tube that is put into your bladder to drain your urine  · Go to behavior therapy as directed    Behavior therapy may be used to help you learn to control your urge to urinate  Weight Management   Why it is important to manage your weight:  Being overweight increases your risk of health conditions such as heart disease, high blood pressure, type 2 diabetes, and certain types of cancer  It can also increase your risk for osteoarthritis, sleep apnea, and other respiratory problems  Aim for a slow, steady weight loss  Even a small amount of weight loss can lower your risk of health problems  How to lose weight safely:  A safe and healthy way to lose weight is to eat fewer calories and get regular exercise  You can lose up about 1 pound a week by decreasing the number of calories you eat by 500 calories each day  Healthy meal plan for weight management:  A healthy meal plan includes a variety of foods, contains fewer calories, and helps you stay healthy  A healthy meal plan includes the following:  · Eat whole-grain foods more often  A healthy meal plan should contain fiber  Fiber is the part of grains, fruits, and vegetables that is not broken down by your body  Whole-grain foods are healthy and provide extra fiber in your diet  Some examples of whole-grain foods are whole-wheat breads and pastas, oatmeal, brown rice, and bulgur  · Eat a variety of vegetables every day  Include dark, leafy greens such as spinach, kale, laura greens, and mustard greens  Eat yellow and orange vegetables such as carrots, sweet potatoes, and winter squash  · Eat a variety of fruits every day  Choose fresh or canned fruit (canned in its own juice or light syrup) instead of juice  Fruit juice has very little or no fiber  · Eat low-fat dairy foods  Drink fat-free (skim) milk or 1% milk  Eat fat-free yogurt and low-fat cottage cheese  Try low-fat cheeses such as mozzarella and other reduced-fat cheeses  · Choose meat and other protein foods that are low in fat  Choose beans or other legumes such as split peas or lentils   Choose fish, skinless poultry (chicken or turkey), or lean cuts of red meat (beef or pork)  Before you cook meat or poultry, cut off any visible fat  · Use less fat and oil  Try baking foods instead of frying them  Add less fat, such as margarine, sour cream, regular salad dressing and mayonnaise to foods  Eat fewer high-fat foods  Some examples of high-fat foods include french fries, doughnuts, ice cream, and cakes  · Eat fewer sweets  Limit foods and drinks that are high in sugar  This includes candy, cookies, regular soda, and sweetened drinks  Exercise:  Exercise at least 30 minutes per day on most days of the week  Some examples of exercise include walking, biking, dancing, and swimming  You can also fit in more physical activity by taking the stairs instead of the elevator or parking farther away from stores  Ask your healthcare provider about the best exercise plan for you  © Copyright [employer] 2018 Information is for End User's use only and may not be sold, redistributed or otherwise used for commercial purposes   All illustrations and images included in CareNotes® are the copyrighted property of [employer]  or [address] [place] [address]

## 2023-03-29 DIAGNOSIS — I10 ESSENTIAL HYPERTENSION: ICD-10-CM

## 2023-03-29 RX ORDER — LOSARTAN POTASSIUM 100 MG/1
TABLET ORAL
Qty: 90 TABLET | Refills: 1 | Status: SHIPPED | OUTPATIENT
Start: 2023-03-29

## 2023-05-02 PROBLEM — Z00.00 MEDICARE ANNUAL WELLNESS VISIT, SUBSEQUENT: Status: RESOLVED | Noted: 2021-01-08 | Resolved: 2023-05-02

## 2023-07-17 ENCOUNTER — TELEPHONE (OUTPATIENT)
Dept: FAMILY MEDICINE CLINIC | Facility: CLINIC | Age: 73
End: 2023-07-17

## 2023-09-05 ENCOUNTER — RA CDI HCC (OUTPATIENT)
Dept: OTHER | Facility: HOSPITAL | Age: 73
End: 2023-09-05

## 2023-09-05 NOTE — PROGRESS NOTES
[address] [facility] coding opportunities       Chart reviewed, no opportunity found: [address]        Patients Insurance     Medicare Insurance: [employer] Advantage

## 2023-09-10 PROBLEM — I48.11 LONGSTANDING PERSISTENT ATRIAL FIBRILLATION (HCC): Status: ACTIVE | Noted: 2023-09-01

## 2023-09-11 ENCOUNTER — OFFICE VISIT (OUTPATIENT)
Dept: FAMILY MEDICINE CLINIC | Facility: CLINIC | Age: 73
End: 2023-09-11
Payer: COMMERCIAL

## 2023-09-11 VITALS
TEMPERATURE: 97.2 F | DIASTOLIC BLOOD PRESSURE: 70 MMHG | BODY MASS INDEX: 29.27 KG/M2 | SYSTOLIC BLOOD PRESSURE: 140 MMHG | WEIGHT: 155 LBS | HEART RATE: 47 BPM | RESPIRATION RATE: 16 BRPM | HEIGHT: 61 IN

## 2023-09-11 DIAGNOSIS — I10 ESSENTIAL HYPERTENSION: ICD-10-CM

## 2023-09-11 DIAGNOSIS — I48.11 LONGSTANDING PERSISTENT ATRIAL FIBRILLATION (HCC): ICD-10-CM

## 2023-09-11 DIAGNOSIS — M48.02 STENOSIS OF CERVICAL SPINE WITH MYELOPATHY (HCC): ICD-10-CM

## 2023-09-11 DIAGNOSIS — G99.2 STENOSIS OF CERVICAL SPINE WITH MYELOPATHY (HCC): ICD-10-CM

## 2023-09-11 DIAGNOSIS — M19.90 ARTHRITIS: ICD-10-CM

## 2023-09-11 DIAGNOSIS — K21.9 GASTROESOPHAGEAL REFLUX DISEASE WITHOUT ESOPHAGITIS: ICD-10-CM

## 2023-09-11 DIAGNOSIS — I48.0 PAROXYSMAL ATRIAL FIBRILLATION (HCC): ICD-10-CM

## 2023-09-11 DIAGNOSIS — I10 PRIMARY HYPERTENSION: Primary | ICD-10-CM

## 2023-09-11 PROCEDURE — 99214 OFFICE O/P EST MOD 30 MIN: CPT | Performed by: FAMILY MEDICINE

## 2023-09-11 RX ORDER — MELATONIN
1000 DAILY
COMMUNITY

## 2023-09-11 RX ORDER — PHENOL 1.4 %
600 AEROSOL, SPRAY (ML) MUCOUS MEMBRANE 2 TIMES DAILY WITH MEALS
COMMUNITY

## 2023-09-11 RX ORDER — LOSARTAN POTASSIUM 100 MG/1
100 TABLET ORAL DAILY
Qty: 90 TABLET | Refills: 1 | Status: SHIPPED | OUTPATIENT
Start: 2023-09-11

## 2023-09-11 NOTE — PROGRESS NOTES
Name: Regina De      : 1950      MRN: [id number]  Encounter Provider: Cailin Arriaga MD  Encounter Date: 2023   Encounter department: [facility]     1. Primary hypertension  Assessment & Plan:  STABLE  DENIES ANY CP, SOB, PALPITATIONS, OR HEADACHE  NOTES NO WATER RETENTION  COMPLIANT WITH MEDICATION  NO CONCERNS    - CONTINUE CURRENT TREATMENT PLAN  - MONITOR DIETARY SODIUM INTAKE  - ENCOURAGE PHYSICAL ACTIVITY  - RV 3 MONTHS        2. Longstanding persistent atrial fibrillation (HCC)    3. Paroxysmal atrial fibrillation (HCC)  Assessment & Plan:  STABLE        4. Arthritis  Assessment & Plan:  STABLE  DENIES ANY JOINT SWELLING OR REDNESS  JOINT STIFFNESS PRESENT  PAIN MANAGEMENT ADEQUATE    - CONTINUE CURRENT MANAGEMENT  - MEDICATION AS DIRECTED  - CALL / RETURN IF SYMPTOMS WORSEN        5. Stenosis of cervical spine with myelopathy ([address])    6. Gastroesophageal reflux disease without esophagitis  Assessment & Plan:  STABLE  DENIES ANY CP, INDIGESTION, OR COUGH  NOTES NO MELENA OR HEMATOCHEZIA  NO HEMATEMESIS  COMPLIANT WITH MEDICATION  NO CONCERNS    - CONTINUE CURRENT TREATMENT PLAN  - MEDICATION AS PRESCRIBED  - AVOID CAFFEINE, ALCOHOL OR SMOKING        7. Essential hypertension  -     losartan (COZAAR) 100 MG tablet; Take 1 tablet (100 mg total) by mouth daily           Subjective      PATIENT RETURNS FOR ROUTINE EVALUATION OF PATIENT'S MEDICAL ISSUES    INDIVIDUAL MEDICAL ISSUES WITH THEIR CURRENT STATUS, ASSESSMENT AND PLANS ARE LISTED ABOVE        Review of Systems   Constitutional: Negative for chills, fatigue and fever. HENT: Negative for congestion, ear discharge, ear pain, mouth sores, postnasal drip, sore throat and trouble swallowing. Eyes: Negative for pain, discharge and visual disturbance. Respiratory: Negative for cough, shortness of breath and wheezing.     Cardiovascular: Negative for chest pain, palpitations and leg swelling. Gastrointestinal: Negative for abdominal distention, abdominal pain, blood in stool, diarrhea and nausea. Endocrine: Negative for polydipsia, polyphagia and polyuria. Genitourinary: Negative for dysuria, frequency, hematuria and urgency. Musculoskeletal: Negative for arthralgias, gait problem and joint swelling. Skin: Negative for pallor and rash. Neurological: Negative for dizziness, syncope, speech difficulty, weakness, light-headedness, numbness and headaches. Hematological: Negative for adenopathy. Psychiatric/Behavioral: Negative for behavioral problems, confusion and sleep disturbance. The patient is not nervous/anxious.         Current Outpatient Medications on File Prior to Visit   Medication Sig   • calcium carbonate (OS-MEGAN) 600 MG tablet Take 600 mg by mouth 2 (two) times a day with meals   • cholecalciferol (VITAMIN D3) 1,000 units tablet Take 1,000 Units by mouth daily   • Eliquis 5 MG 5 mg 2 (two) times a day    • loratadine (CLARITIN) 10 mg tablet Take 10 mg by mouth daily as needed     • metoprolol tartrate (LOPRESSOR) 25 mg tablet Take 25 mg by mouth 2 (two) times a day   • Multiple Vitamins-Minerals (OCUVITE ADULT FORMULA PO) Take by mouth daily   • Naproxen Sodium 220 MG CAPS Take by mouth daily as needed   • Omega-3 Fatty Acids (Fish Oil) 1000 MG CPDR Take by mouth   • pantoprazole (PROTONIX) 40 mg tablet Take 1 tablet by mouth Daily   • PREMARIN vaginal cream Insert into the vagina 2 (two) times a week    • [DISCONTINUED] losartan (COZAAR) 100 MG tablet TAKE ONE TABLET BY MOUTH EVERY DAY   • EPINEPHrine (EPIPEN) 0.3 mg/0.3 mL SOAJ Inject 0.3 mL (0.3 mg total) into a muscle once for 1 dose       Objective     /70 (BP Location: Left arm, Patient Position: Sitting, Cuff Size: Standard)   Pulse (!) 47   Temp (!) 97.2 °F (36.2 °C) (Temporal)   Resp 16   Ht 5' 0.6" (1.539 m)   Wt 70.3 kg (155 lb)   BMI 29.68 kg/m²     Physical Exam  Constitutional:       General: She is not in acute distress. Appearance: Normal appearance. She is well-developed and normal weight. She is not ill-appearing. HENT:      Head: Normocephalic and atraumatic. Eyes:      General:         Right eye: No discharge. Left eye: No discharge. Conjunctiva/sclera: Conjunctivae normal.      Pupils: Pupils are equal, round, and reactive to light. Neck:      Thyroid: No thyromegaly. Cardiovascular:      Rate and Rhythm: Regular rhythm. Bradycardia present. Heart sounds: Normal heart sounds. No murmur heard. Pulmonary:      Effort: Pulmonary effort is normal. No respiratory distress. Breath sounds: Normal breath sounds. No wheezing or rales. Abdominal:      General: Bowel sounds are normal.      Palpations: Abdomen is soft. Tenderness: There is no abdominal tenderness. Musculoskeletal:         General: No tenderness. Normal range of motion. Cervical back: Normal range of motion and neck supple. Lymphadenopathy:      Cervical: No cervical adenopathy. Skin:     General: Skin is warm and dry. Findings: No erythema or rash. Neurological:      General: No focal deficit present. Mental Status: She is alert and oriented to person, place, and time. Psychiatric:         Mood and Affect: Mood normal.         Behavior: Behavior normal.         Thought Content:  Thought content normal.         Judgment: Judgment normal.       Everett Hand MD

## 2023-10-23 ENCOUNTER — IMMUNIZATIONS (OUTPATIENT)
Dept: FAMILY MEDICINE CLINIC | Facility: CLINIC | Age: 73
End: 2023-10-23
Payer: COMMERCIAL

## 2023-10-23 DIAGNOSIS — Z23 ENCOUNTER FOR IMMUNIZATION: Primary | ICD-10-CM

## 2023-10-23 PROCEDURE — 90662 IIV NO PRSV INCREASED AG IM: CPT

## 2023-10-23 PROCEDURE — G0008 ADMIN INFLUENZA VIRUS VAC: HCPCS

## 2024-01-26 DIAGNOSIS — Z00.00 MEDICARE ANNUAL WELLNESS VISIT, SUBSEQUENT: ICD-10-CM

## 2024-01-26 DIAGNOSIS — I10 PRIMARY HYPERTENSION: ICD-10-CM

## 2024-01-26 DIAGNOSIS — I48.0 PAROXYSMAL ATRIAL FIBRILLATION (HCC): ICD-10-CM

## 2024-01-26 DIAGNOSIS — Z13.220 SCREENING FOR LIPID DISORDERS: ICD-10-CM

## 2024-01-26 DIAGNOSIS — E04.1 THYROID NODULE: Primary | ICD-10-CM

## 2024-01-26 DIAGNOSIS — Z79.01 CHRONIC ANTICOAGULATION: ICD-10-CM

## 2024-01-26 DIAGNOSIS — K21.9 GASTROESOPHAGEAL REFLUX DISEASE WITHOUT ESOPHAGITIS: ICD-10-CM

## 2024-02-25 ENCOUNTER — RA CDI HCC (OUTPATIENT)
Dept: OTHER | Facility: HOSPITAL | Age: 74
End: 2024-02-25

## 2024-02-26 LAB
BASOPHILS # BLD AUTO: 0 X10E3/UL (ref 0–0.2)
BASOPHILS NFR BLD AUTO: 1 %
EOSINOPHIL # BLD AUTO: 0.1 X10E3/UL (ref 0–0.4)
EOSINOPHIL NFR BLD AUTO: 2 %
ERYTHROCYTE [DISTWIDTH] IN BLOOD BY AUTOMATED COUNT: 12.7 % (ref 11.7–15.4)
HCT VFR BLD AUTO: 37.9 % (ref 34–46.6)
HGB BLD-MCNC: 12.2 G/DL (ref 11.1–15.9)
IMM GRANULOCYTES # BLD: 0 X10E3/UL (ref 0–0.1)
IMM GRANULOCYTES NFR BLD: 0 %
LYMPHOCYTES # BLD AUTO: 1.3 X10E3/UL (ref 0.7–3.1)
LYMPHOCYTES NFR BLD AUTO: 31 %
MCH RBC QN AUTO: 27.9 PG (ref 26.6–33)
MCHC RBC AUTO-ENTMCNC: 32.2 G/DL (ref 31.5–35.7)
MCV RBC AUTO: 87 FL (ref 79–97)
MONOCYTES # BLD AUTO: 0.4 X10E3/UL (ref 0.1–0.9)
MONOCYTES NFR BLD AUTO: 10 %
NEUTROPHILS # BLD AUTO: 2.5 X10E3/UL (ref 1.4–7)
NEUTROPHILS NFR BLD AUTO: 56 %
PLATELET # BLD AUTO: 233 X10E3/UL (ref 150–450)
RBC # BLD AUTO: 4.38 X10E6/UL (ref 3.77–5.28)
WBC # BLD AUTO: 4.3 X10E3/UL (ref 3.4–10.8)

## 2024-02-27 LAB
ALBUMIN SERPL-MCNC: 4.2 G/DL (ref 3.8–4.8)
ALBUMIN/GLOB SERPL: 2.2 {RATIO} (ref 1.2–2.2)
ALP SERPL-CCNC: 74 IU/L (ref 44–121)
ALT SERPL-CCNC: 14 IU/L (ref 0–32)
AST SERPL-CCNC: 20 IU/L (ref 0–40)
BILIRUB SERPL-MCNC: 0.5 MG/DL (ref 0–1.2)
BUN SERPL-MCNC: 18 MG/DL (ref 8–27)
BUN/CREAT SERPL: 32 (ref 12–28)
CALCIUM SERPL-MCNC: 9.2 MG/DL (ref 8.7–10.3)
CHLORIDE SERPL-SCNC: 103 MMOL/L (ref 96–106)
CHOLEST SERPL-MCNC: 199 MG/DL (ref 100–199)
CHOLEST/HDLC SERPL: 2.2 RATIO (ref 0–4.4)
CO2 SERPL-SCNC: 25 MMOL/L (ref 20–29)
CREAT SERPL-MCNC: 0.57 MG/DL (ref 0.57–1)
EGFR: 97 ML/MIN/1.73
GLOBULIN SER-MCNC: 1.9 G/DL (ref 1.5–4.5)
GLUCOSE SERPL-MCNC: 84 MG/DL (ref 70–99)
HDLC SERPL-MCNC: 91 MG/DL
LDLC SERPL CALC-MCNC: 99 MG/DL (ref 0–99)
POTASSIUM SERPL-SCNC: 4.7 MMOL/L (ref 3.5–5.2)
PROT SERPL-MCNC: 6.1 G/DL (ref 6–8.5)
SL AMB VLDL CHOLESTEROL CALC: 9 MG/DL (ref 5–40)
SODIUM SERPL-SCNC: 141 MMOL/L (ref 134–144)
TRIGL SERPL-MCNC: 47 MG/DL (ref 0–149)
TSH SERPL DL<=0.005 MIU/L-ACNC: 3.5 UIU/ML (ref 0.45–4.5)

## 2024-03-04 ENCOUNTER — TELEPHONE (OUTPATIENT)
Dept: FAMILY MEDICINE CLINIC | Facility: CLINIC | Age: 74
End: 2024-03-04

## 2024-03-04 ENCOUNTER — OFFICE VISIT (OUTPATIENT)
Dept: FAMILY MEDICINE CLINIC | Facility: CLINIC | Age: 74
End: 2024-03-04
Payer: COMMERCIAL

## 2024-03-04 VITALS
DIASTOLIC BLOOD PRESSURE: 80 MMHG | OXYGEN SATURATION: 97 % | HEART RATE: 50 BPM | HEIGHT: 63 IN | BODY MASS INDEX: 27.46 KG/M2 | RESPIRATION RATE: 14 BRPM | SYSTOLIC BLOOD PRESSURE: 158 MMHG | TEMPERATURE: 97.2 F | WEIGHT: 155 LBS

## 2024-03-04 DIAGNOSIS — Z79.01 CHRONIC ANTICOAGULATION: ICD-10-CM

## 2024-03-04 DIAGNOSIS — Z00.00 MEDICARE ANNUAL WELLNESS VISIT, SUBSEQUENT: ICD-10-CM

## 2024-03-04 DIAGNOSIS — I10 PRIMARY HYPERTENSION: Primary | ICD-10-CM

## 2024-03-04 DIAGNOSIS — M48.062 SPINAL STENOSIS OF LUMBAR REGION WITH NEUROGENIC CLAUDICATION: ICD-10-CM

## 2024-03-04 DIAGNOSIS — M85.89 OSTEOPENIA OF MULTIPLE SITES: ICD-10-CM

## 2024-03-04 DIAGNOSIS — Z78.0 ASYMPTOMATIC MENOPAUSE: ICD-10-CM

## 2024-03-04 DIAGNOSIS — E04.1 THYROID NODULE: ICD-10-CM

## 2024-03-04 DIAGNOSIS — Z12.11 COLON CANCER SCREENING: ICD-10-CM

## 2024-03-04 DIAGNOSIS — I48.11 LONGSTANDING PERSISTENT ATRIAL FIBRILLATION (HCC): ICD-10-CM

## 2024-03-04 DIAGNOSIS — K21.9 GASTROESOPHAGEAL REFLUX DISEASE WITHOUT ESOPHAGITIS: ICD-10-CM

## 2024-03-04 DIAGNOSIS — Z13.820 SCREENING FOR OSTEOPOROSIS: ICD-10-CM

## 2024-03-04 DIAGNOSIS — Z12.31 ENCOUNTER FOR SCREENING MAMMOGRAM FOR BREAST CANCER: ICD-10-CM

## 2024-03-04 PROBLEM — M79.18 MYOFASCIAL MUSCLE PAIN: Status: RESOLVED | Noted: 2017-09-08 | Resolved: 2024-03-04

## 2024-03-04 PROBLEM — M48.02 STENOSIS OF CERVICAL SPINE WITH MYELOPATHY (HCC): Status: RESOLVED | Noted: 2019-10-08 | Resolved: 2024-03-04

## 2024-03-04 PROBLEM — G99.2 STENOSIS OF CERVICAL SPINE WITH MYELOPATHY (HCC): Status: RESOLVED | Noted: 2019-10-08 | Resolved: 2024-03-04

## 2024-03-04 PROBLEM — I48.0 PAROXYSMAL ATRIAL FIBRILLATION (HCC): Status: RESOLVED | Noted: 2021-04-15 | Resolved: 2024-03-04

## 2024-03-04 PROCEDURE — G0439 PPPS, SUBSEQ VISIT: HCPCS | Performed by: FAMILY MEDICINE

## 2024-03-04 PROCEDURE — 99215 OFFICE O/P EST HI 40 MIN: CPT | Performed by: FAMILY MEDICINE

## 2024-03-04 NOTE — PATIENT INSTRUCTIONS
DISCUSSED HEALTH ISSUES  HEALTHY DIET AND EXERCISE  BW WILL BE REVIEWED  MAMMOGRAPHY   RECOMMEND CALCIUM 2144-2785 MG DAILY  VITAMIN D3  1000 IU DAILY  RV IN 1 YEAR FOR ANNUAL EXAM, SOONER IF NEEDED    RV 6M    Recent Results (from the past 672 hour(s))   CBC and differential    Collection Time: 02/26/24  7:54 AM   Result Value Ref Range    White Blood Cell Count 4.3 3.4 - 10.8 x10E3/uL    Red Blood Cell Count 4.38 3.77 - 5.28 x10E6/uL    Hemoglobin 12.2 11.1 - 15.9 g/dL    HCT 37.9 34.0 - 46.6 %    MCV 87 79 - 97 fL    MCH 27.9 26.6 - 33.0 pg    MCHC 32.2 31.5 - 35.7 g/dL    RDW 12.7 11.7 - 15.4 %    Platelet Count 233 150 - 450 x10E3/uL    Neutrophils 56 Not Estab. %    Lymphocytes 31 Not Estab. %    Monocytes 10 Not Estab. %    Eosinophils 2 Not Estab. %    Basophils PCT 1 Not Estab. %    Neutrophils (Absolute) 2.5 1.4 - 7.0 x10E3/uL    Lymphocytes (Absolute) 1.3 0.7 - 3.1 x10E3/uL    Monocytes (Absolute) 0.4 0.1 - 0.9 x10E3/uL    Eosinophils (Absolute) 0.1 0.0 - 0.4 x10E3/uL    Basophils ABS 0.0 0.0 - 0.2 x10E3/uL    Immature Granulocytes 0 Not Estab. %    Immature Granulocytes (Absolute) 0.0 0.0 - 0.1 x10E3/uL   Comprehensive metabolic panel    Collection Time: 02/26/24  7:54 AM   Result Value Ref Range    Glucose, Random 84 70 - 99 mg/dL    BUN 18 8 - 27 mg/dL    Creatinine 0.57 0.57 - 1.00 mg/dL    eGFR 97 >59 mL/min/1.73    SL AMB BUN/CREATININE RATIO 32 (H) 12 - 28    Sodium 141 134 - 144 mmol/L    Potassium 4.7 3.5 - 5.2 mmol/L    Chloride 103 96 - 106 mmol/L    CO2 25 20 - 29 mmol/L    CALCIUM 9.2 8.7 - 10.3 mg/dL    Protein, Total 6.1 6.0 - 8.5 g/dL    Albumin 4.2 3.8 - 4.8 g/dL    Globulin, Total 1.9 1.5 - 4.5 g/dL    Albumin/Globulin Ratio 2.2 1.2 - 2.2    TOTAL BILIRUBIN 0.5 0.0 - 1.2 mg/dL    Alk Phos Isoenzymes 74 44 - 121 IU/L    AST 20 0 - 40 IU/L    ALT 14 0 - 32 IU/L   Lipid panel    Collection Time: 02/26/24  7:54 AM   Result Value Ref Range    Cholesterol, Total 199 100 - 199 mg/dL     Triglycerides 47 0 - 149 mg/dL    HDL 91 >39 mg/dL    VLDL Cholesterol Calculated 9 5 - 40 mg/dL    LDL Calculated 99 0 - 99 mg/dL    T. Chol/HDL Ratio 2.2 0.0 - 4.4 ratio   TSH, 3rd generation    Collection Time: 02/26/24  7:54 AM   Result Value Ref Range    TSH 3.500 0.450 - 4.500 uIU/mL       Medicare Preventive Visit Patient Instructions  Thank you for completing your Welcome to Medicare Visit or Medicare Annual Wellness Visit today. Your next wellness visit will be due in one year (3/5/2025).  The screening/preventive services that you may require over the next 5-10 years are detailed below. Some tests may not apply to you based off risk factors and/or age. Screening tests ordered at today's visit but not completed yet may show as past due. Also, please note that scanned in results may not display below.  Preventive Screenings:  Service Recommendations Previous Testing/Comments   Colorectal Cancer Screening  * Colonoscopy    * Fecal Occult Blood Test (FOBT)/Fecal Immunochemical Test (FIT)  * Fecal DNA/Cologuard Test  * Flexible Sigmoidoscopy Age: 45-75 years old   Colonoscopy: every 10 years (may be performed more frequently if at higher risk)  OR  FOBT/FIT: every 1 year  OR  Cologuard: every 3 years  OR  Sigmoidoscopy: every 5 years  Screening may be recommended earlier than age 45 if at higher risk for colorectal cancer. Also, an individualized decision between you and your healthcare provider will decide whether screening between the ages of 76-85 would be appropriate. Colonoscopy: 04/23/2014  FOBT/FIT: Not on file  Cologuard: Not on file  Sigmoidoscopy: Not on file    Screening Current     Breast Cancer Screening Age: 40+ years old  Frequency: every 1-2 years  Not required if history of left and right mastectomy Mammogram: 08/10/2022    Screening Current   Cervical Cancer Screening Between the ages of 21-29, pap smear recommended once every 3 years.   Between the ages of 30-65, can perform pap smear with  HPV co-testing every 5 years.   Recommendations may differ for women with a history of total hysterectomy, cervical cancer, or abnormal pap smears in past. Pap Smear: 11/23/2018    Screening Not Indicated   Hepatitis C Screening Once for adults born between 1945 and 1965  More frequently in patients at high risk for Hepatitis C Hep C Antibody: Not on file    Screening Current   Diabetes Screening 1-2 times per year if you're at risk for diabetes or have pre-diabetes Fasting glucose: No results in last 5 years (No results in last 5 years)  A1C: No results in last 5 years (No results in last 5 years)  Screening Current   Cholesterol Screening Once every 5 years if you don't have a lipid disorder. May order more often based on risk factors. Lipid panel: 02/26/2024    Screening Current     Other Preventive Screenings Covered by Medicare:  Abdominal Aortic Aneurysm (AAA) Screening: covered once if your at risk. You're considered to be at risk if you have a family history of AAA.  Lung Cancer Screening: covers low dose CT scan once per year if you meet all of the following conditions: (1) Age 55-77; (2) No signs or symptoms of lung cancer; (3) Current smoker or have quit smoking within the last 15 years; (4) You have a tobacco smoking history of at least 20 pack years (packs per day multiplied by number of years you smoked); (5) You get a written order from a healthcare provider.  Glaucoma Screening: covered annually if you're considered high risk: (1) You have diabetes OR (2) Family history of glaucoma OR (3)  aged 50 and older OR (4)  American aged 65 and older  Osteoporosis Screening: covered every 2 years if you meet one of the following conditions: (1) You're estrogen deficient and at risk for osteoporosis based off medical history and other findings; (2) Have a vertebral abnormality; (3) On glucocorticoid therapy for more than 3 months; (4) Have primary hyperparathyroidism; (5) On  osteoporosis medications and need to assess response to drug therapy.   Last bone density test (DXA Scan): 08/10/2022.  HIV Screening: covered annually if you're between the age of 15-65. Also covered annually if you are younger than 15 and older than 65 with risk factors for HIV infection. For pregnant patients, it is covered up to 3 times per pregnancy.    Immunizations:  Immunization Recommendations   Influenza Vaccine Annual influenza vaccination during flu season is recommended for all persons aged >= 6 months who do not have contraindications   Pneumococcal Vaccine   * Pneumococcal conjugate vaccine = PCV13 (Prevnar 13), PCV15 (Vaxneuvance), PCV20 (Prevnar 20)  * Pneumococcal polysaccharide vaccine = PPSV23 (Pneumovax) Adults 19-63 yo with certain risk factors or if 65+ yo  If never received any pneumonia vaccine: recommend Prevnar 20 (PCV20)  Give PCV20 if previously received 1 dose of PCV13 or PPSV23   Hepatitis B Vaccine 3 dose series if at intermediate or high risk (ex: diabetes, end stage renal disease, liver disease)   Respiratory syncytial virus (RSV) Vaccine - COVERED BY MEDICARE PART D  * RSVPreF3 (Arexvy) CDC recommends that adults 60 years of age and older may receive a single dose of RSV vaccine using shared clinical decision-making (SCDM)   Tetanus (Td) Vaccine - COST NOT COVERED BY MEDICARE PART B Following completion of primary series, a booster dose should be given every 10 years to maintain immunity against tetanus. Td may also be given as tetanus wound prophylaxis.   Tdap Vaccine - COST NOT COVERED BY MEDICARE PART B Recommended at least once for all adults. For pregnant patients, recommended with each pregnancy.   Shingles Vaccine (Shingrix) - COST NOT COVERED BY MEDICARE PART B  2 shot series recommended in those 19 years and older who have or will have weakened immune systems or those 50 years and older     Health Maintenance Due:      Topic Date Due   • Breast Cancer Screening: Mammogram   08/10/2023   • Cervical Cancer Screening  11/23/2023   • Colorectal Cancer Screening  04/23/2024   • Hepatitis C Screening  Addressed     Immunizations Due:      Topic Date Due   • COVID-19 Vaccine (3 - 2023-24 season) 09/01/2023     Advance Directives   What are advance directives?  Advance directives are legal documents that state your wishes and plans for medical care. These plans are made ahead of time in case you lose your ability to make decisions for yourself. Advance directives can apply to any medical decision, such as the treatments you want, and if you want to donate organs.   What are the types of advance directives?  There are many types of advance directives, and each state has rules about how to use them. You may choose a combination of any of the following:  Living will:  This is a written record of the treatment you want. You can also choose which treatments you do not want, which to limit, and which to stop at a certain time. This includes surgery, medicine, IV fluid, and tube feedings.   Durable power of  for healthcare (DPAHC):  This is a written record that states who you want to make healthcare choices for you when you are unable to make them for yourself. This person, called a proxy, is usually a family member or a friend. You may choose more than 1 proxy.  Do not resuscitate (DNR) order:  A DNR order is used in case your heart stops beating or you stop breathing. It is a request not to have certain forms of treatment, such as CPR. A DNR order may be included in other types of advance directives.  Medical directive:  This covers the care that you want if you are in a coma, near death, or unable to make decisions for yourself. You can list the treatments you want for each condition. Treatment may include pain medicine, surgery, blood transfusions, dialysis, IV or tube feedings, and a ventilator (breathing machine).  Values history:  This document has questions about your views, beliefs,  and how you feel and think about life. This information can help others choose the care that you would choose.  Why are advance directives important?  An advance directive helps you control your care. Although spoken wishes may be used, it is better to have your wishes written down. Spoken wishes can be misunderstood, or not followed. Treatments may be given even if you do not want them. An advance directive may make it easier for your family to make difficult choices about your care.   Urinary Incontinence   Urinary incontinence (UI)  is when you lose control of your bladder. UI develops because your bladder cannot store or empty urine properly. The 3 most common types of UI are stress incontinence, urge incontinence, or both.  Medicines:   May be given to help strengthen your bladder control. Report any side effects of medication to your healthcare provider.  Do pelvic muscle exercises often:  Your pelvic muscles help you stop urinating. Squeeze these muscles tight for 5 seconds, then relax for 5 seconds. Gradually work up to squeezing for 10 seconds. Do 3 sets of 15 repetitions a day, or as directed. This will help strengthen your pelvic muscles and improve bladder control.  Train your bladder:  Go to the bathroom at set times, such as every 2 hours, even if you do not feel the urge to go. You can also try to hold your urine when you feel the urge to go. For example, hold your urine for 5 minutes when you feel the urge to go. As that becomes easier, hold your urine for 10 minutes.   Self-care:   Keep a UI record.  Write down how often you leak urine and how much you leak. Make a note of what you were doing when you leaked urine.  Drink liquids as directed. You may need to limit the amount of liquid you drink to help control your urine leakage. Do not drink any liquid right before you go to bed. Limit or do not have drinks that contain caffeine or alcohol.   Prevent constipation.  Eat a variety of high-fiber  foods. Good examples are high-fiber cereals, beans, vegetables, and whole-grain breads. Walking is the best way to trigger your intestines to have a bowel movement.  Exercise regularly and maintain a healthy weight.  Weight loss and exercise will decrease pressure on your bladder and help you control your leakage.   Use a catheter as directed  to help empty your bladder. A catheter is a tiny, plastic tube that is put into your bladder to drain your urine.   Go to behavior therapy as directed.  Behavior therapy may be used to help you learn to control your urge to urinate.    Weight Management   Why it is important to manage your weight:  Being overweight increases your risk of health conditions such as heart disease, high blood pressure, type 2 diabetes, and certain types of cancer. It can also increase your risk for osteoarthritis, sleep apnea, and other respiratory problems. Aim for a slow, steady weight loss. Even a small amount of weight loss can lower your risk of health problems.  How to lose weight safely:  A safe and healthy way to lose weight is to eat fewer calories and get regular exercise. You can lose up about 1 pound a week by decreasing the number of calories you eat by 500 calories each day.   Healthy meal plan for weight management:  A healthy meal plan includes a variety of foods, contains fewer calories, and helps you stay healthy. A healthy meal plan includes the following:  Eat whole-grain foods more often.  A healthy meal plan should contain fiber. Fiber is the part of grains, fruits, and vegetables that is not broken down by your body. Whole-grain foods are healthy and provide extra fiber in your diet. Some examples of whole-grain foods are whole-wheat breads and pastas, oatmeal, brown rice, and bulgur.  Eat a variety of vegetables every day.  Include dark, leafy greens such as spinach, kale, laura greens, and mustard greens. Eat yellow and orange vegetables such as carrots, sweet potatoes,  and winter squash.   Eat a variety of fruits every day.  Choose fresh or canned fruit (canned in its own juice or light syrup) instead of juice. Fruit juice has very little or no fiber.  Eat low-fat dairy foods.  Drink fat-free (skim) milk or 1% milk. Eat fat-free yogurt and low-fat cottage cheese. Try low-fat cheeses such as mozzarella and other reduced-fat cheeses.  Choose meat and other protein foods that are low in fat.  Choose beans or other legumes such as split peas or lentils. Choose fish, skinless poultry (chicken or turkey), or lean cuts of red meat (beef or pork). Before you cook meat or poultry, cut off any visible fat.   Use less fat and oil.  Try baking foods instead of frying them. Add less fat, such as margarine, sour cream, regular salad dressing and mayonnaise to foods. Eat fewer high-fat foods. Some examples of high-fat foods include french fries, doughnuts, ice cream, and cakes.  Eat fewer sweets.  Limit foods and drinks that are high in sugar. This includes candy, cookies, regular soda, and sweetened drinks.  Exercise:  Exercise at least 30 minutes per day on most days of the week. Some examples of exercise include walking, biking, dancing, and swimming. You can also fit in more physical activity by taking the stairs instead of the elevator or parking farther away from stores. Ask your healthcare provider about the best exercise plan for you.      © Copyright [employer] 2018 Information is for End User's use only and may not be sold, redistributed or otherwise used for commercial purposes. All illustrations and images included in CareNotes® are the copyrighted property of [employer] or [employer]

## 2024-03-04 NOTE — TELEPHONE ENCOUNTER
ARIELLE Porras is not due for her dexa until 8/2024, but took the order and will schedule when its gets closer.  Thanks

## 2024-03-04 NOTE — LETTER
TAINA MUÑOZ      Current Outpatient Medications:     calcium carbonate (OS-MEGAN) 600 MG tablet, Take 600 mg by mouth 2 (two) times a day with meals, Disp: , Rfl:     cholecalciferol (VITAMIN D3) 1,000 units tablet, Take 1,000 Units by mouth daily, Disp: , Rfl:     Eliquis 5 MG, 5 mg 2 (two) times a day , Disp: , Rfl:     EPINEPHrine (EPIPEN) 0.3 mg/0.3 mL SOAJ, Inject 0.3 mL (0.3 mg total) into a muscle once for 1 dose, Disp: 0.6 mL, Rfl: 0    loratadine (CLARITIN) 10 mg tablet, Take 10 mg by mouth daily as needed  , Disp: , Rfl:     losartan (COZAAR) 100 MG tablet, Take 1 tablet (100 mg total) by mouth daily, Disp: 90 tablet, Rfl: 1    metoprolol tartrate (LOPRESSOR) 25 mg tablet, Take 25 mg by mouth 2 (two) times a day, Disp: , Rfl:     Multiple Vitamins-Minerals (OCUVITE ADULT FORMULA PO), Take by mouth daily, Disp: , Rfl:     Naproxen Sodium 220 MG CAPS, Take by mouth daily as needed, Disp: , Rfl:     Omega-3 Fatty Acids (Fish Oil) 1000 MG CPDR, Take by mouth, Disp: , Rfl:     pantoprazole (PROTONIX) 40 mg tablet, Take 1 tablet by mouth Daily, Disp: , Rfl:     PREMARIN vaginal cream, Insert into the vagina 2 (two) times a week , Disp: , Rfl:       Recent Results (from the past 672 hour(s))   CBC and differential    Collection Time: 02/26/24  7:54 AM   Result Value Ref Range    White Blood Cell Count 4.3 3.4 - 10.8 x10E3/uL    Red Blood Cell Count 4.38 3.77 - 5.28 x10E6/uL    Hemoglobin 12.2 11.1 - 15.9 g/dL    HCT 37.9 34.0 - 46.6 %    MCV 87 79 - 97 fL    MCH 27.9 26.6 - 33.0 pg    MCHC 32.2 31.5 - 35.7 g/dL    RDW 12.7 11.7 - 15.4 %    Platelet Count 233 150 - 450 x10E3/uL    Neutrophils 56 Not Estab. %    Lymphocytes 31 Not Estab. %    Monocytes 10 Not Estab. %    Eosinophils 2 Not Estab. %    Basophils PCT 1 Not Estab. %    Neutrophils (Absolute) 2.5 1.4 - 7.0 x10E3/uL    Lymphocytes (Absolute) 1.3 0.7 - 3.1 x10E3/uL    Monocytes (Absolute) 0.4 0.1 - 0.9 x10E3/uL    Eosinophils (Absolute) 0.1 0.0 - 0.4  x10E3/uL    Basophils ABS 0.0 0.0 - 0.2 x10E3/uL    Immature Granulocytes 0 Not Estab. %    Immature Granulocytes (Absolute) 0.0 0.0 - 0.1 x10E3/uL   Comprehensive metabolic panel    Collection Time: 02/26/24  7:54 AM   Result Value Ref Range    Glucose, Random 84 70 - 99 mg/dL    BUN 18 8 - 27 mg/dL    Creatinine 0.57 0.57 - 1.00 mg/dL    eGFR 97 >59 mL/min/1.73    SL AMB BUN/CREATININE RATIO 32 (H) 12 - 28    Sodium 141 134 - 144 mmol/L    Potassium 4.7 3.5 - 5.2 mmol/L    Chloride 103 96 - 106 mmol/L    CO2 25 20 - 29 mmol/L    CALCIUM 9.2 8.7 - 10.3 mg/dL    Protein, Total 6.1 6.0 - 8.5 g/dL    Albumin 4.2 3.8 - 4.8 g/dL    Globulin, Total 1.9 1.5 - 4.5 g/dL    Albumin/Globulin Ratio 2.2 1.2 - 2.2    TOTAL BILIRUBIN 0.5 0.0 - 1.2 mg/dL    Alk Phos Isoenzymes 74 44 - 121 IU/L    AST 20 0 - 40 IU/L    ALT 14 0 - 32 IU/L   Lipid panel    Collection Time: 02/26/24  7:54 AM   Result Value Ref Range    Cholesterol, Total 199 100 - 199 mg/dL    Triglycerides 47 0 - 149 mg/dL    HDL 91 >39 mg/dL    VLDL Cholesterol Calculated 9 5 - 40 mg/dL    LDL Calculated 99 0 - 99 mg/dL    T. Chol/HDL Ratio 2.2 0.0 - 4.4 ratio   TSH, 3rd generation    Collection Time: 02/26/24  7:54 AM   Result Value Ref Range    TSH 3.500 0.450 - 4.500 uIU/mL

## 2024-03-04 NOTE — PROGRESS NOTES
Assessment and Plan:     Problem List Items Addressed This Visit        Digestive    Gastroesophageal reflux disease without esophagitis     STABLE  DENIES ANY CP, INDIGESTION, OR COUGH  NOTES NO MELENA OR HEMATOCHEZIA  NO HEMATEMESIS  COMPLIANT WITH MEDICATION  NO CONCERNS    - CONTINUE CURRENT TREATMENT PLAN  - MEDICATION AS PRESCRIBED  - AVOID CAFFEINE, ALCOHOL OR SMOKING              Endocrine    Thyroid nodule    Relevant Orders    US thyroid       Cardiovascular and Mediastinum    Hypertension - Primary     STABLE  DENIES ANY CP, SOB, PALPITATIONS, OR HEADACHE  NOTES NO WATER RETENTION  COMPLIANT WITH MEDICATION  NO CONCERNS    - CONTINUE CURRENT TREATMENT PLAN  - MONITOR DIETARY SODIUM INTAKE  - ENCOURAGE PHYSICAL ACTIVITY  - RV 3 MONTHS           Longstanding persistent atrial fibrillation (HCC)     STABLE  FOLLOWED BY CARDIOLOGY            Musculoskeletal and Integument    Osteopenia of multiple sites     STABLE  DENIES ANY JOINT SWELLING OR REDNESS  JOINT STIFFNESS PRESENT  PAIN MANAGEMENT ADEQUATE    - CONTINUE CURRENT MANAGEMENT  - MEDICATION AS DIRECTED  - CALL / RETURN IF SYMPTOMS WORSEN           Relevant Orders    DXA bone density spine hip and pelvis       Other    Spinal stenosis of lumbar region with neurogenic claudication    Encounter for screening mammogram for breast cancer    Relevant Orders    Mammo screening bilateral w 3d & cad    Chronic anticoagulation   Other Visit Diagnoses     Colon cancer screening        Asymptomatic menopause        Relevant Orders    DXA bone density spine hip and pelvis    Screening for osteoporosis        Relevant Orders    DXA bone density spine hip and pelvis    Medicare annual wellness visit, subsequent              Depression Screening and Follow-up Plan: Patient was screened for depression during today's encounter. They screened negative with a PHQ-2 score of 0.    Urinary Incontinence Plan of Care: counseling topics discussed: joseph Parekh (pelvic floor  strengthening) exercises.       Preventive health issues were discussed with patient, and age appropriate screening tests were ordered as noted in patient's After Visit Summary.  Personalized health advice and appropriate referrals for health education or preventive services given if needed, as noted in patient's After Visit Summary.     History of Present Illness:     Patient presents for a Medicare Wellness Visit    PATIENT RETURNS FOR ANNUAL WELLNESS EXAM AND ANNUAL EVALUATION OF PATIENT'S MEDICAL ISSUES    INDIVIDUAL MEDICAL ISSUES WITH THEIR CURRENT STATUS, ASSESSMENT AND PLANS ARE LISTED ABOVE      DISCUSSED HEALTH ISSUES       Patient Care Team:  Bry Herbert MD as PCP - General (Family Medicine)  Priscilla Simmons MD (Gynecology)  Rozina Devine MD (Ophthalmology)  Tra Hamilton (Cardiology)  Stalin Crowley MD (Gastroenterology)  Christiane Whiting DO (Dermatology)  Gee Nolasco MD (Neurosurgery)  Aleksandr Villalobos DPM (Podiatry)     Review of Systems:     Review of Systems   Constitutional:  Negative for chills, fatigue and fever.   HENT:  Negative for congestion, ear discharge, ear pain, mouth sores, postnasal drip, sore throat and trouble swallowing.    Eyes:  Negative for pain, discharge and visual disturbance.   Respiratory:  Negative for cough, shortness of breath and wheezing.    Cardiovascular:  Negative for chest pain, palpitations and leg swelling.   Gastrointestinal:  Negative for abdominal distention, abdominal pain, blood in stool, diarrhea and nausea.   Endocrine: Negative for polydipsia, polyphagia and polyuria.   Genitourinary:  Negative for dysuria, frequency, hematuria and urgency.   Musculoskeletal:  Positive for arthralgias. Negative for gait problem and joint swelling.   Skin:  Negative for pallor and rash.   Neurological:  Negative for dizziness, syncope, speech difficulty, weakness, light-headedness, numbness and headaches.   Hematological:  Negative for adenopathy.    Psychiatric/Behavioral:  Negative for behavioral problems, confusion and sleep disturbance. The patient is not nervous/anxious.         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   • Gastroesophageal reflux disease without esophagitis   • SVT (supraventricular tachycardia)   • Numbness and tingling of foot   • Encounter for screening mammogram for breast cancer   • Chronic anticoagulation   • Osteopenia of multiple sites   • Longstanding persistent atrial fibrillation (HCC)      Past Medical and Surgical History:     Past Medical History:   Diagnosis Date   • Arthritis    • Enthesopathy of lower extremity     last assessed 08/21/2015   • GERD (gastroesophageal reflux disease)    • High blood pressure    • History of basal cell cancer    • Ocular migraine    • SVT (supraventricular tachycardia)      Past Surgical History:   Procedure Laterality Date   • CERVICAL DISCECTOMY  2018    [facility]   • LUMBAR LAMINECTOMY  2000   • MOHS SURGERY  2017    [facility]   • US GUIDED THYROID BIOPSY  1/10/2023      Family History:     Family History   Problem Relation Age of Onset   • Arthritis Mother    • Cancer Mother    • Esophageal cancer Mother    • Lung cancer Father    • Cancer Father    • Hypertension Father    • Stroke Maternal Grandmother    • Heart disease Maternal Grandfather    • Substance Abuse Neg Hx    • Mental illness Neg Hx       Social History:     Social History     Socioeconomic History   • Marital status:      Spouse name: None   • Number of children: None   • Years of education: None   • Highest education level: None   Occupational History   • None   Tobacco Use   • Smoking status: Never   • Smokeless tobacco: Never   Vaping Use   • Vaping status: Never Used    Substance and Sexual Activity   • Alcohol use: Yes     Comment: occ   • Drug use: No   • Sexual activity: None   Other Topics Concern   • None   Social History Narrative    Active advance directive    Caffeine use    Dental care regularly    Drinks coffee     Social Determinants of Health     Financial Resource Strain: Low Risk  (2/27/2024)    Overall Financial Resource Strain (CARDIA)    • Difficulty of Paying Living Expenses: Not very hard   Food Insecurity: Not on file   Transportation Needs: No Transportation Needs (2/27/2024)    PRAPARE - Transportation    • Lack of Transportation (Medical): No    • Lack of Transportation (Non-Medical): No   Physical Activity: Not on file   Stress: Not on file   Social Connections: Not on file   Intimate Partner Violence: Not on file   Housing Stability: Not on file      Medications and Allergies:     Current Outpatient Medications   Medication Sig Dispense Refill   • calcium carbonate (OS-MEGAN) 600 MG tablet Take 600 mg by mouth daily     • cholecalciferol (VITAMIN D3) 1,000 units tablet Take 1,000 Units by mouth daily     • Eliquis 5 MG 5 mg 2 (two) times a day      • EPINEPHrine (EPIPEN) 0.3 mg/0.3 mL SOAJ Inject 0.3 mL (0.3 mg total) into a muscle once for 1 dose 0.6 mL 0   • loratadine (CLARITIN) 10 mg tablet Take 10 mg by mouth daily as needed       • losartan (COZAAR) 100 MG tablet Take 1 tablet (100 mg total) by mouth daily 90 tablet 1   • metoprolol tartrate (LOPRESSOR) 25 mg tablet Take 25 mg by mouth 2 (two) times a day     • Multiple Vitamins-Minerals (OCUVITE ADULT FORMULA PO) Take by mouth daily     • Naproxen Sodium 220 MG CAPS Take by mouth daily as needed     • Omega-3 Fatty Acids (Fish Oil) 1000 MG CPDR Take by mouth     • pantoprazole (PROTONIX) 40 mg tablet Take 1 tablet by mouth Daily     • PREMARIN vaginal cream Insert into the vagina 2 (two) times a week        No current facility-administered medications for this visit.     Allergies   Allergen  Reactions   • Other Anaphylaxis   • Sunflower Oil - Food Allergy Hives and Lip Swelling     And sunflower seeds   • Amoxicillin GI Intolerance     Other reaction(s): Nausea and/or vomiting  Other reaction(s): Nausea and/or vomiting   • Amoxicillin-Pot Clavulanate GI Intolerance     Other reaction(s): Nausea and/or vomiting  AUGMENTIN   • Erythromycin GI Intolerance     Other reaction(s): Nausea and/or vomiting  Annotation - 10Mar2015: vomiting  Other reaction(s): Nausea and/or vomiting  Annotation - 10Mar2015: vomiting      Immunizations:     Immunization History   Administered Date(s) Administered   • COVID-19 MODERNA VACC 0.5 ML IM 08/17/2021, 09/14/2021   • Influenza Split High Dose Preservative Free IM 10/04/2017   • Influenza, high dose seasonal 0.7 mL 09/19/2018, 10/24/2019, 09/17/2020, 10/14/2021, 09/29/2022, 10/23/2023   • Influenza, seasonal, injectable 09/01/2016   • Pneumococcal Conjugate 13-Valent 11/22/2017   • Pneumococcal Polysaccharide PPV23 10/02/2020   • TD (adult) Preservative Free 03/08/2015      Health Maintenance:         Topic Date Due   • Breast Cancer Screening: Mammogram  08/10/2023   • Cervical Cancer Screening  11/23/2023   • Colorectal Cancer Screening  04/23/2024   • Hepatitis C Screening  Addressed         Topic Date Due   • COVID-19 Vaccine (3 - 2023-24 season) 09/01/2023      Medicare Screening Tests and Risk Assessments:         Health Risk Assessment:   Patient rates overall health as good. Patient feels that their physical health rating is same. Patient is satisfied with their life. Eyesight was rated as same. Hearing was rated as same. Patient feels that their emotional and mental health rating is same. Patients states they are never, rarely angry. Patient states they are sometimes unusually tired/fatigued. Pain experienced in the last 7 days has been some. Patient's pain rating has been 4/10. Patient states that she has experienced no weight loss or gain in last 6 months.      Depression Screening:   PHQ-2 Score: 0      Fall Risk Screening:   In the past year, patient has experienced: no history of falling in past year      Urinary Incontinence Screening:   Patient has leaked urine accidently in the last six months.     Home Safety:  Patient does not have trouble with stairs inside or outside of their home. Patient has working smoke alarms and has working carbon monoxide detector. Home safety hazards include: poor household lighting.     Nutrition:   Current diet is Regular and Limited junk food.     Medications:   Patient is currently taking over-the-counter supplements. OTC medications include: see medication list. Patient is able to manage medications.     Activities of Daily Living (ADLs)/Instrumental Activities of Daily Living (IADLs):   Walk and transfer into and out of bed and chair?: Yes  Dress and groom yourself?: Yes    Bathe or shower yourself?: Yes    Feed yourself? Yes  Do your laundry/housekeeping?: Yes  Manage your money, pay your bills and track your expenses?: Yes  Make your own meals?: Yes    Do your own shopping?: Yes    Previous Hospitalizations:   Any hospitalizations or ED visits within the last 12 months?: No      Advance Care Planning:   Living will: Yes    Durable POA for healthcare: No    Advanced directive: Yes    Provider agrees with end of life decisions: Yes      Cognitive Screening:   Provider or family/friend/caregiver concerned regarding cognition?: No    PREVENTIVE SCREENINGS      Cardiovascular Screening:    General: Screening Current      Diabetes Screening:     General: Screening Current      Colorectal Cancer Screening:     General: Screening Current      Breast Cancer Screening:     General: Screening Current      Cervical Cancer Screening:    General: Screening Not Indicated      Osteoporosis Screening:    General: Screening Current      Abdominal Aortic Aneurysm (AAA) Screening:        General: Screening Not Indicated      Lung Cancer  "Screening:     General: Screening Not Indicated      Hepatitis C Screening:    General: Screening Current    Screening, Brief Intervention, and Referral to Treatment (SBIRT)    Screening  Typical number of drinks in a day: 0  Typical number of drinks in a week: 0  Interpretation: Low risk drinking behavior.    AUDIT-C Screenin) How often did you have a drink containing alcohol in the past year? never  2) How many drinks did you have on a typical day when you were drinking in the past year? 0  3) How often did you have 6 or more drinks on one occasion in the past year? never    AUDIT-C Score: 0  Interpretation: Score 0-2 (female): Negative screen for alcohol misuse    Single Item Drug Screening:  How often have you used an illegal drug (including marijuana) or a prescription medication for non-medical reasons in the past year? never    Single Item Drug Screen Score: 0  Interpretation: Negative screen for possible drug use disorder    No results found.     Physical Exam:     /80 (BP Location: Left arm, Patient Position: Sitting, Cuff Size: Large)   Pulse (!) 50   Temp (!) 97.2 °F (36.2 °C) (Temporal)   Resp 14   Ht 5' 2.5\" (1.588 m)   Wt 70.3 kg (155 lb)   SpO2 97%   BMI 27.90 kg/m²     Physical Exam  Constitutional:       General: She is not in acute distress.     Appearance: Normal appearance. She is well-developed and normal weight. She is not ill-appearing.   HENT:      Head: Normocephalic and atraumatic.      Right Ear: External ear normal.      Left Ear: External ear normal.      Nose: Nose normal.      Mouth/Throat:      Mouth: Mucous membranes are moist.      Pharynx: No oropharyngeal exudate.   Eyes:      General: No scleral icterus.        Right eye: No discharge.         Left eye: No discharge.      Conjunctiva/sclera: Conjunctivae normal.      Pupils: Pupils are equal, round, and reactive to light.   Neck:      Thyroid: No thyromegaly.      Trachea: No tracheal deviation. "   Cardiovascular:      Rate and Rhythm: Normal rate and regular rhythm.      Heart sounds: Normal heart sounds. No murmur heard.     No friction rub. No gallop.   Pulmonary:      Effort: Pulmonary effort is normal. No respiratory distress.      Breath sounds: Normal breath sounds. No wheezing or rales.   Chest:      Chest wall: No tenderness.   Abdominal:      General: Bowel sounds are normal. There is no distension.      Palpations: Abdomen is soft. There is no mass.      Tenderness: There is no abdominal tenderness. There is no guarding or rebound.      Hernia: No hernia is present.   Musculoskeletal:         General: No tenderness or deformity. Normal range of motion.      Cervical back: Normal range of motion and neck supple.      Comments: MILD DJD CHANGES   Lymphadenopathy:      Cervical: No cervical adenopathy.   Skin:     General: Skin is warm and dry.      Findings: No erythema or rash.   Neurological:      General: No focal deficit present.      Mental Status: She is alert and oriented to person, place, and time.      Cranial Nerves: No cranial nerve deficit.      Sensory: No sensory deficit.      Motor: No weakness or abnormal muscle tone.      Coordination: Coordination normal.      Gait: Gait normal.      Deep Tendon Reflexes: Reflexes normal.   Psychiatric:         Mood and Affect: Mood normal.         Behavior: Behavior normal.         Thought Content: Thought content normal.         Judgment: Judgment normal.        DISCUSSED HEALTH ISSUES    LENGTH OF VISIT 50 MIN    Bry Herbert MD

## 2024-03-20 DIAGNOSIS — I10 ESSENTIAL HYPERTENSION: ICD-10-CM

## 2024-03-20 DIAGNOSIS — L50.9 URTICARIA: ICD-10-CM

## 2024-03-20 RX ORDER — LOSARTAN POTASSIUM 100 MG/1
TABLET ORAL
Qty: 90 TABLET | Refills: 1 | Status: SHIPPED | OUTPATIENT
Start: 2024-03-20

## 2024-03-20 RX ORDER — EPINEPHRINE 0.3 MG/.3ML
INJECTION SUBCUTANEOUS
Qty: 2 EACH | Refills: 0 | Status: SHIPPED | OUTPATIENT
Start: 2024-03-20

## 2024-05-06 ENCOUNTER — HOSPITAL ENCOUNTER (OUTPATIENT)
Dept: RADIOLOGY | Facility: HOSPITAL | Age: 74
Discharge: HOME | End: 2024-05-06
Payer: COMMERCIAL

## 2024-05-06 DIAGNOSIS — Z12.31 ENCOUNTER FOR SCREENING MAMMOGRAM FOR MALIGNANT NEOPLASM OF BREAST: ICD-10-CM

## 2024-05-06 PROCEDURE — 77067 SCR MAMMO BI INCL CAD: CPT

## 2024-05-06 PROCEDURE — 77063 BREAST TOMOSYNTHESIS BI: CPT

## 2024-07-25 DIAGNOSIS — I10 ESSENTIAL HYPERTENSION: Primary | ICD-10-CM

## 2024-08-26 ENCOUNTER — RA CDI HCC (OUTPATIENT)
Dept: OTHER | Facility: HOSPITAL | Age: 74
End: 2024-08-26

## 2024-08-26 PROBLEM — Z12.31 ENCOUNTER FOR SCREENING MAMMOGRAM FOR BREAST CANCER: Status: RESOLVED | Noted: 2021-10-14 | Resolved: 2024-08-26

## 2024-08-26 LAB
ALBUMIN SERPL-MCNC: 4.3 G/DL (ref 3.8–4.8)
ALP SERPL-CCNC: 77 IU/L (ref 44–121)
ALT SERPL-CCNC: 17 IU/L (ref 0–32)
AST SERPL-CCNC: 23 IU/L (ref 0–40)
BILIRUB SERPL-MCNC: 0.6 MG/DL (ref 0–1.2)
BUN SERPL-MCNC: 20 MG/DL (ref 8–27)
BUN/CREAT SERPL: 26 (ref 12–28)
CALCIUM SERPL-MCNC: 9.8 MG/DL (ref 8.7–10.3)
CHLORIDE SERPL-SCNC: 103 MMOL/L (ref 96–106)
CO2 SERPL-SCNC: 26 MMOL/L (ref 20–29)
CREAT SERPL-MCNC: 0.76 MG/DL (ref 0.57–1)
EGFR: 84 ML/MIN/1.73
GLOBULIN SER-MCNC: 2.1 G/DL (ref 1.5–4.5)
GLUCOSE SERPL-MCNC: 88 MG/DL (ref 70–99)
POTASSIUM SERPL-SCNC: 4.7 MMOL/L (ref 3.5–5.2)
PROT SERPL-MCNC: 6.4 G/DL (ref 6–8.5)
SODIUM SERPL-SCNC: 140 MMOL/L (ref 134–144)

## 2024-08-29 ENCOUNTER — TELEPHONE (OUTPATIENT)
Dept: ADMINISTRATIVE | Facility: OTHER | Age: 74
End: 2024-08-29

## 2024-08-29 NOTE — TELEPHONE ENCOUNTER
08/29/24 2:08 PM    Patient contacted to bring Advance Directive, POLST, or Living Will document to next scheduled pcp visit.[facility] Department spoke with patient/ caregiver.    Thank you.  Cristobal Salvador MA  [facility]

## 2024-09-03 RX ORDER — UBIDECARENONE 75 MG
100 CAPSULE ORAL DAILY
COMMUNITY

## 2024-09-04 ENCOUNTER — OFFICE VISIT (OUTPATIENT)
Dept: FAMILY MEDICINE CLINIC | Facility: CLINIC | Age: 74
End: 2024-09-04
Payer: COMMERCIAL

## 2024-09-04 VITALS
BODY MASS INDEX: 28.35 KG/M2 | HEART RATE: 60 BPM | SYSTOLIC BLOOD PRESSURE: 118 MMHG | TEMPERATURE: 96.6 F | HEIGHT: 63 IN | RESPIRATION RATE: 18 BRPM | DIASTOLIC BLOOD PRESSURE: 82 MMHG | OXYGEN SATURATION: 98 % | WEIGHT: 160 LBS

## 2024-09-04 DIAGNOSIS — M48.062 SPINAL STENOSIS OF LUMBAR REGION WITH NEUROGENIC CLAUDICATION: ICD-10-CM

## 2024-09-04 DIAGNOSIS — I10 ESSENTIAL HYPERTENSION: ICD-10-CM

## 2024-09-04 DIAGNOSIS — I10 PRIMARY HYPERTENSION: Primary | ICD-10-CM

## 2024-09-04 DIAGNOSIS — K21.9 GASTROESOPHAGEAL REFLUX DISEASE WITHOUT ESOPHAGITIS: ICD-10-CM

## 2024-09-04 DIAGNOSIS — I48.11 LONGSTANDING PERSISTENT ATRIAL FIBRILLATION (HCC): ICD-10-CM

## 2024-09-04 PROCEDURE — 1159F MED LIST DOCD IN RCRD: CPT | Performed by: FAMILY MEDICINE

## 2024-09-04 PROCEDURE — 1160F RVW MEDS BY RX/DR IN RCRD: CPT | Performed by: FAMILY MEDICINE

## 2024-09-04 PROCEDURE — G2211 COMPLEX E/M VISIT ADD ON: HCPCS | Performed by: FAMILY MEDICINE

## 2024-09-04 PROCEDURE — 3074F SYST BP LT 130 MM HG: CPT | Performed by: FAMILY MEDICINE

## 2024-09-04 PROCEDURE — 3079F DIAST BP 80-89 MM HG: CPT | Performed by: FAMILY MEDICINE

## 2024-09-04 PROCEDURE — 99214 OFFICE O/P EST MOD 30 MIN: CPT | Performed by: FAMILY MEDICINE

## 2024-09-04 RX ORDER — LOSARTAN POTASSIUM 100 MG/1
100 TABLET ORAL DAILY
Qty: 100 TABLET | Refills: 1 | Status: SHIPPED | OUTPATIENT
Start: 2024-09-04

## 2024-09-04 NOTE — PROGRESS NOTES
Ambulatory Visit  Name: Chiquita Murray      : 1950      MRN: [id number]  Encounter Provider: Bry Herbert MD  Encounter Date: 2024   Encounter department: [facility]    Assessment & Plan   1. Primary hypertension  Assessment & Plan:  STABLE  DENIES ANY CP, SOB, PALPITATIONS, OR HEADACHE  NOTES NO WATER RETENTION  COMPLIANT WITH MEDICATION  NO CONCERNS    - CONTINUE CURRENT TREATMENT PLAN  - MONITOR DIETARY SODIUM INTAKE  - ENCOURAGE PHYSICAL ACTIVITY  - RV 3 MONTHS    2. Gastroesophageal reflux disease without esophagitis  Assessment & Plan:  STABLE  DENIES ANY CP, INDIGESTION, OR COUGH  NOTES NO MELENA OR HEMATOCHEZIA  NO HEMATEMESIS  COMPLIANT WITH MEDICATION  NO CONCERNS    - CONTINUE CURRENT TREATMENT PLAN  - MEDICATION AS PRESCRIBED  - AVOID CAFFEINE, ALCOHOL OR SMOKING    3. Longstanding persistent atrial fibrillation (HCC)  Assessment & Plan:  STABLE  4. Spinal stenosis of lumbar region with neurogenic claudication  Assessment & Plan:  STABLE  DENIES ANY JOINT SWELLING OR REDNESS  JOINT STIFFNESS PRESENT  PAIN MANAGEMENT ADEQUATE    - CONTINUE CURRENT MANAGEMENT  - MEDICATION AS DIRECTED  - CALL / RETURN IF SYMPTOMS WORSEN    5. Essential hypertension  -     losartan (COZAAR) 100 MG tablet; Take 1 tablet (100 mg total) by mouth daily         History of Present Illness     PATIENT RETURNS FOR ROUTINE EVALUATION OF PATIENT'S MEDICAL ISSUES    INDIVIDUAL MEDICAL ISSUES WITH THEIR CURRENT STATUS, ASSESSMENT AND PLANS ARE LISTED ABOVE            Review of Systems   Constitutional:  Negative for chills, fatigue and fever.   HENT:  Negative for congestion, ear discharge, ear pain, mouth sores, postnasal drip, sore throat and trouble swallowing.    Eyes:  Negative for pain, discharge and visual disturbance.   Respiratory:  Negative for cough, shortness of breath and wheezing.    Cardiovascular:  Negative for chest pain, palpitations and leg swelling.   Gastrointestinal:   Negative for abdominal distention, abdominal pain, blood in stool, diarrhea and nausea.   Endocrine: Negative for polydipsia, polyphagia and polyuria.   Genitourinary:  Negative for dysuria, frequency, hematuria and urgency.   Musculoskeletal:  Negative for arthralgias, gait problem and joint swelling.   Skin:  Negative for pallor and rash.   Neurological:  Negative for dizziness, syncope, speech difficulty, weakness, light-headedness, numbness and headaches.   Hematological:  Negative for adenopathy.   Psychiatric/Behavioral:  Negative for behavioral problems, confusion and sleep disturbance. The patient is not nervous/anxious.      Past Medical History:   Diagnosis Date   • Arthritis    • Enthesopathy of lower extremity     last assessed 08/21/2015   • GERD (gastroesophageal reflux disease)    • High blood pressure    • History of basal cell cancer    • Ocular migraine    • SVT (supraventricular tachycardia)      Past Surgical History:   Procedure Laterality Date   • CERVICAL DIS[facility]Y  2018    [facility]   • LUMBAR LAMINECTOMY  2000   • MOHS SURGERY  2017    [facility]   • US GUIDED THYROID BIOPSY  1/10/2023     Family History   Problem Relation Age of Onset   • Arthritis Mother    • Cancer Mother    • Esophageal cancer Mother    • Lung cancer Father    • Cancer Father    • Hypertension Father    • Stroke Maternal Grandmother    • Heart disease Maternal Grandfather    • Substance Abuse Neg Hx    • Mental illness Neg Hx      Social History     Tobacco Use   • Smoking status: Never     Passive exposure: Never   • Smokeless tobacco: Never   Vaping Use   • Vaping status: Never Used   Substance and Sexual Activity   • Alcohol use: Yes     Comment: occ   • Drug use: No   • Sexual activity: Not on file     Current Outpatient Medications on File Prior to Visit   Medication Sig   • calcium carbonate (OS-MEGAN) 600 MG tablet Take 600 mg by mouth daily   • cholecalciferol (VITAMIN D3) 1,000  units tablet Take 1,000 Units by mouth daily   • cyanocobalamin (VITAMIN B-12) 100 mcg tablet Take 100 mcg by mouth daily   • Eliquis 5 MG 5 mg 2 (two) times a day    • EPINEPHrine (EPIPEN) 0.3 mg/0.3 mL SOAJ INJECT 0.3 ML INTO A MUSCLE ONCE FOR 1 DOSE   • loratadine (CLARITIN) 10 mg tablet Take 10 mg by mouth daily as needed     • metoprolol tartrate (LOPRESSOR) 25 mg tablet Take 25 mg by mouth 2 (two) times a day   • Multiple Vitamins-Minerals (OCUVITE ADULT FORMULA PO) Take by mouth daily   • Naproxen Sodium 220 MG CAPS Take by mouth daily as needed   • Omega-3 Fatty Acids (Fish Oil) 1000 MG CPDR Take by mouth   • pantoprazole (PROTONIX) 40 mg tablet Take 1 tablet by mouth Daily   • PREMARIN vaginal cream Insert into the vagina 2 (two) times a week    • [DISCONTINUED] losartan (COZAAR) 100 MG tablet TAKE ONE TABLET BY MOUTH EVERY DAY (GENERIC COZAAR)     Allergies   Allergen Reactions   • Other Anaphylaxis   • Sunflower Oil - Food Allergy Hives and Lip Swelling     And sunflower seeds   • Amoxicillin GI Intolerance     Other reaction(s): Nausea and/or vomiting  Other reaction(s): Nausea and/or vomiting   • Amoxicillin-Pot Clavulanate GI Intolerance     Other reaction(s): Nausea and/or vomiting  AUGMENTIN   • Erythromycin GI Intolerance     Other reaction(s): Nausea and/or vomiting  Annotation - 10Mar2015: vomiting  Other reaction(s): Nausea and/or vomiting  Annotation - 10Mar2015: vomiting     Immunization History   Administered Date(s) Administered   • COVID-19 MODERNA VACC 0.5 ML IM 08/17/2021, 09/14/2021   • Influenza Split High Dose Preservative Free IM 10/04/2017   • Influenza, high dose seasonal 0.7 mL 09/19/2018, 10/24/2019, 09/17/2020, 10/14/2021, 09/29/2022, 10/23/2023   • Influenza, seasonal, injectable 09/01/2016   • Pneumococcal Conjugate 13-Valent 11/22/2017   • Pneumococcal Polysaccharide PPV23 10/02/2020   • TD (adult) Preservative Free 03/08/2015     Objective     /82 (BP Location: Left arm,  "Patient Position: Sitting, Cuff Size: Large)   Pulse 60   Temp (!) 96.6 °F (35.9 °C) (Temporal)   Resp 18   Ht 5' 2.5\" (1.588 m)   Wt 72.6 kg (160 lb)   SpO2 98%   BMI 28.80 kg/m²     Physical Exam  Constitutional:       General: She is not in acute distress.     Appearance: Normal appearance. She is well-developed and normal weight. She is not ill-appearing.   HENT:      Head: Normocephalic and atraumatic.      Nose: Nose normal. No congestion.   Eyes:      General:         Right eye: No discharge.         Left eye: No discharge.      Conjunctiva/sclera: Conjunctivae normal.      Pupils: Pupils are equal, round, and reactive to light.   Neck:      Thyroid: No thyromegaly.   Cardiovascular:      Rate and Rhythm: Normal rate and regular rhythm.      Heart sounds: Normal heart sounds. No murmur heard.  Pulmonary:      Effort: Pulmonary effort is normal. No respiratory distress.      Breath sounds: Normal breath sounds. No wheezing or rales.   Abdominal:      General: Bowel sounds are normal.      Palpations: Abdomen is soft.      Tenderness: There is no abdominal tenderness.   Musculoskeletal:         General: No tenderness. Normal range of motion.      Cervical back: Normal range of motion and neck supple.   Lymphadenopathy:      Cervical: No cervical adenopathy.   Skin:     General: Skin is warm and dry.      Findings: No erythema or rash.   Neurological:      General: No focal deficit present.      Mental Status: She is alert and oriented to person, place, and time.   Psychiatric:         Behavior: Behavior normal.         Thought Content: Thought content normal.         Judgment: Judgment normal.         "

## 2024-09-04 NOTE — LETTER
TAINA MUÑOZ      Current Outpatient Medications:     calcium carbonate (OS-MEGAN) 600 MG tablet, Take 600 mg by mouth daily, Disp: , Rfl:     cholecalciferol (VITAMIN D3) 1,000 units tablet, Take 1,000 Units by mouth daily, Disp: , Rfl:     cyanocobalamin (VITAMIN B-12) 100 mcg tablet, Take 100 mcg by mouth daily, Disp: , Rfl:     Eliquis 5 MG, 5 mg 2 (two) times a day , Disp: , Rfl:     EPINEPHrine (EPIPEN) 0.3 mg/0.3 mL SOAJ, INJECT 0.3 ML INTO A MUSCLE ONCE FOR 1 DOSE, Disp: 2 each, Rfl: 0    loratadine (CLARITIN) 10 mg tablet, Take 10 mg by mouth daily as needed  , Disp: , Rfl:     losartan (COZAAR) 100 MG tablet, TAKE ONE TABLET BY MOUTH EVERY DAY (GENERIC COZAAR), Disp: 90 tablet, Rfl: 1    metoprolol tartrate (LOPRESSOR) 25 mg tablet, Take 25 mg by mouth 2 (two) times a day, Disp: , Rfl:     Multiple Vitamins-Minerals (OCUVITE ADULT FORMULA PO), Take by mouth daily, Disp: , Rfl:     Naproxen Sodium 220 MG CAPS, Take by mouth daily as needed, Disp: , Rfl:     Omega-3 Fatty Acids (Fish Oil) 1000 MG CPDR, Take by mouth, Disp: , Rfl:     pantoprazole (PROTONIX) 40 mg tablet, Take 1 tablet by mouth Daily, Disp: , Rfl:     PREMARIN vaginal cream, Insert into the vagina 2 (two) times a week , Disp: , Rfl:       Recent Results (from the past 672 hour(s))   Comprehensive metabolic panel    Collection Time: 08/26/24  7:56 AM   Result Value Ref Range    Glucose, Random 88 70 - 99 mg/dL    BUN 20 8 - 27 mg/dL    Creatinine 0.76 0.57 - 1.00 mg/dL    eGFR 84 >59 mL/min/1.73    SL AMB BUN/CREATININE RATIO 26 12 - 28    Sodium 140 134 - 144 mmol/L    Potassium 4.7 3.5 - 5.2 mmol/L    Chloride 103 96 - 106 mmol/L    CO2 26 20 - 29 mmol/L    CALCIUM 9.8 8.7 - 10.3 mg/dL    Protein, Total 6.4 6.0 - 8.5 g/dL    Albumin 4.3 3.8 - 4.8 g/dL    Globulin, Total 2.1 1.5 - 4.5 g/dL    TOTAL BILIRUBIN 0.6 0.0 - 1.2 mg/dL    Alk Phos Isoenzymes 77 44 - 121 IU/L    AST 23 0 - 40 IU/L    ALT 17 0 - 32 IU/L

## 2024-09-04 NOTE — PATIENT INSTRUCTIONS
CONTINUE CURRENT TREATMENT PLAN  MONITOR DIETARY SODIUM, CHOL INTAKE  ENCOURAGE PHYSICAL ACTIVITY      RV 6 M, SOONER PRN    Recent Results (from the past 672 hour(s))   Comprehensive metabolic panel    Collection Time: 08/26/24  7:56 AM   Result Value Ref Range    Glucose, Random 88 70 - 99 mg/dL    BUN 20 8 - 27 mg/dL    Creatinine 0.76 0.57 - 1.00 mg/dL    eGFR 84 >59 mL/min/1.73    SL AMB BUN/CREATININE RATIO 26 12 - 28    Sodium 140 134 - 144 mmol/L    Potassium 4.7 3.5 - 5.2 mmol/L    Chloride 103 96 - 106 mmol/L    CO2 26 20 - 29 mmol/L    CALCIUM 9.8 8.7 - 10.3 mg/dL    Protein, Total 6.4 6.0 - 8.5 g/dL    Albumin 4.3 3.8 - 4.8 g/dL    Globulin, Total 2.1 1.5 - 4.5 g/dL    TOTAL BILIRUBIN 0.6 0.0 - 1.2 mg/dL    Alk Phos Isoenzymes 77 44 - 121 IU/L    AST 23 0 - 40 IU/L    ALT 17 0 - 32 IU/L

## 2024-09-25 ENCOUNTER — TELEPHONE (OUTPATIENT)
Dept: ADMINISTRATIVE | Facility: OTHER | Age: 74
End: 2024-09-25

## 2024-09-25 NOTE — TELEPHONE ENCOUNTER
09/25/24 10:58 AM    Patient contacted to bring Advance Directive, POLST, or Living Will document to next scheduled pcp visit.[facility] Department spoke with patient/ caregiver.    She will be updating her ACP document and will bring it in to Dr. Herbert when it is done.  Patient no longer wishes to receive these calls.    Thank you.  Feng Johns MA  [facility]

## 2024-09-26 ENCOUNTER — IMMUNIZATIONS (OUTPATIENT)
Dept: FAMILY MEDICINE CLINIC | Facility: CLINIC | Age: 74
End: 2024-09-26
Payer: COMMERCIAL

## 2024-09-26 DIAGNOSIS — Z23 ENCOUNTER FOR IMMUNIZATION: Primary | ICD-10-CM

## 2024-09-26 PROCEDURE — 90662 IIV NO PRSV INCREASED AG IM: CPT

## 2024-09-26 PROCEDURE — G0008 ADMIN INFLUENZA VIRUS VAC: HCPCS

## 2025-01-06 ENCOUNTER — OFFICE VISIT (OUTPATIENT)
Dept: FAMILY MEDICINE CLINIC | Facility: CLINIC | Age: 75
End: 2025-01-06
Payer: COMMERCIAL

## 2025-01-06 VITALS
DIASTOLIC BLOOD PRESSURE: 84 MMHG | WEIGHT: 161 LBS | RESPIRATION RATE: 18 BRPM | SYSTOLIC BLOOD PRESSURE: 156 MMHG | BODY MASS INDEX: 28.53 KG/M2 | HEART RATE: 106 BPM | OXYGEN SATURATION: 96 % | HEIGHT: 63 IN | TEMPERATURE: 98.2 F

## 2025-01-06 DIAGNOSIS — I48.11 LONGSTANDING PERSISTENT ATRIAL FIBRILLATION (HCC): ICD-10-CM

## 2025-01-06 DIAGNOSIS — I10 PRIMARY HYPERTENSION: ICD-10-CM

## 2025-01-06 DIAGNOSIS — R05.1 ACUTE COUGH: Primary | ICD-10-CM

## 2025-01-06 DIAGNOSIS — J40 BRONCHITIS: ICD-10-CM

## 2025-01-06 LAB
SARS-COV-2 AG UPPER RESP QL IA: NEGATIVE
SL AMB POCT RAPID FLU A: NORMAL
SL AMB POCT RAPID FLU B: NORMAL
VALID CONTROL: NORMAL

## 2025-01-06 PROCEDURE — 87811 SARS-COV-2 COVID19 W/OPTIC: CPT | Performed by: FAMILY MEDICINE

## 2025-01-06 PROCEDURE — 99214 OFFICE O/P EST MOD 30 MIN: CPT | Performed by: FAMILY MEDICINE

## 2025-01-06 PROCEDURE — G2211 COMPLEX E/M VISIT ADD ON: HCPCS | Performed by: FAMILY MEDICINE

## 2025-01-06 PROCEDURE — 87804 INFLUENZA ASSAY W/OPTIC: CPT | Performed by: FAMILY MEDICINE

## 2025-01-06 RX ORDER — LEVOFLOXACIN 500 MG/1
500 TABLET, FILM COATED ORAL EVERY 24 HOURS
Qty: 10 TABLET | Refills: 0 | Status: SHIPPED | OUTPATIENT
Start: 2025-01-06 | End: 2025-01-16

## 2025-01-06 NOTE — PROGRESS NOTES
Name: Chiquita Murray      : 1950      MRN: [id number]  Encounter Provider: Bry Herbert MD  Encounter Date: 2025   Encounter department: [facility]    Assessment & Plan  Acute cough         Bronchitis    Orders:  •  levofloxacin (LEVAQUIN) 500 mg tablet; Take 1 tablet (500 mg total) by mouth every 24 hours for 10 days    Primary hypertension    ELEVATED BP    - MONITOR SYMPTOMS  MEDICATION COMPLIANCE  RE CHECK IN 2 WEEKS           Longstanding persistent atrial fibrillation (HCC)           Depression Screening and Follow-up Plan: Patient was screened for depression during today's encounter. They screened negative with a PHQ-2 score of 0.        History of Present Illness     Cough  This is a new problem. The current episode started in the past 7 days. The problem has been gradually worsening. The problem occurs every few minutes. The cough is Productive of sputum. Associated symptoms include headaches, nasal congestion, postnasal drip and rhinorrhea. Pertinent negatives include no chest pain, chills, ear congestion, ear pain, fever, rash, sore throat, shortness of breath or wheezing. Nothing aggravates the symptoms. She has tried OTC cough suppressant for the symptoms. The treatment provided no relief.     Review of Systems   Constitutional:  Negative for chills, fatigue and fever.   HENT:  Positive for postnasal drip and rhinorrhea. Negative for congestion, ear discharge, ear pain, mouth sores, sore throat and trouble swallowing.    Eyes:  Negative for pain, discharge and visual disturbance.   Respiratory:  Positive for cough. Negative for shortness of breath and wheezing.    Cardiovascular:  Negative for chest pain, palpitations and leg swelling.   Gastrointestinal:  Negative for abdominal distention, abdominal pain, blood in stool, diarrhea and nausea.   Endocrine: Negative for polydipsia, polyphagia and polyuria.   Genitourinary:  Negative for dysuria, frequency,  hematuria and urgency.   Musculoskeletal:  Negative for arthralgias, gait problem and joint swelling.   Skin:  Negative for pallor and rash.   Neurological:  Positive for headaches. Negative for dizziness, syncope, speech difficulty, weakness, light-headedness and numbness.   Hematological:  Negative for adenopathy.   Psychiatric/Behavioral:  Negative for behavioral problems, confusion and sleep disturbance. The patient is not nervous/anxious.      Past Medical History:   Diagnosis Date   • Arthritis    • Enthesopathy of lower extremity     last assessed 08/21/2015   • GERD (gastroesophageal reflux disease)    • High blood pressure    • History of basal cell cancer    • Ocular migraine    • SVT (supraventricular tachycardia) (HCC)      Past Surgical History:   Procedure Laterality Date   • CERVICAL DISCECTOMY  2018    [facility]   • LUMBAR LAMINECTOMY  2000   • MOHS SURGERY  2017    Bas[facility]   • US GUIDED THYROID BIOPSY  1/10/2023     Family History   Problem Relation Age of Onset   • Arthritis Mother    • Cancer Mother    • Esophageal cancer Mother    • Lung cancer Father    • Cancer Father    • Hypertension Father    • Stroke Maternal Grandmother    • Heart disease Maternal Grandfather    • Substance Abuse Neg Hx    • Mental illness Neg Hx      Social History     Tobacco Use   • Smoking status: Never     Passive exposure: Never   • Smokeless tobacco: Never   Vaping Use   • Vaping status: Never Used   Substance and Sexual Activity   • Alcohol use: Yes     Comment: occ   • Drug use: No   • Sexual activity: Not on file     Current Outpatient Medications on File Prior to Visit   Medication Sig   • calcium carbonate (OS-MEGAN) 600 MG tablet Take 600 mg by mouth daily   • cholecalciferol (VITAMIN D3) 1,000 units tablet Take 1,000 Units by mouth daily   • cyanocobalamin (VITAMIN B-12) 100 mcg tablet Take 100 mcg by mouth daily   • Eliquis 5 MG 5 mg 2 (two) times a day    • EPINEPHrine  "(EPIPEN) 0.3 mg/0.3 mL SOAJ INJECT 0.3 ML INTO A MUSCLE ONCE FOR 1 DOSE   • loratadine (CLARITIN) 10 mg tablet Take 10 mg by mouth daily as needed     • losartan (COZAAR) 100 MG tablet Take 1 tablet (100 mg total) by mouth daily   • metoprolol tartrate (LOPRESSOR) 25 mg tablet Take 25 mg by mouth 2 (two) times a day   • Multiple Vitamins-Minerals (OCUVITE ADULT FORMULA PO) Take by mouth daily   • Naproxen Sodium 220 MG CAPS Take by mouth daily as needed   • Omega-3 Fatty Acids (Fish Oil) 1000 MG CPDR Take by mouth   • pantoprazole (PROTONIX) 40 mg tablet Take 1 tablet by mouth Daily   • PREMARIN vaginal cream Insert into the vagina 2 (two) times a week      Allergies   Allergen Reactions   • Other Anaphylaxis   • Sunflower Oil - Food Allergy Hives and Lip Swelling     And sunflower seeds   • Amoxicillin GI Intolerance     Other reaction(s): Nausea and/or vomiting  Other reaction(s): Nausea and/or vomiting   • Amoxicillin-Pot Clavulanate GI Intolerance     Other reaction(s): Nausea and/or vomiting  AUGMENTIN   • Erythromycin GI Intolerance     Other reaction(s): Nausea and/or vomiting  Annotation - 10Mar2015: vomiting  Other reaction(s): Nausea and/or vomiting  Annotation - 10Mar2015: vomiting     Immunization History   Administered Date(s) Administered   • COVID-19 MODERNA VACC 0.5 ML IM 08/17/2021, 09/14/2021   • Influenza Split High Dose Preservative Free IM 10/04/2017, 09/26/2024   • Influenza, high dose seasonal 0.7 mL 09/19/2018, 10/24/2019, 09/17/2020, 10/14/2021, 09/29/2022, 10/23/2023   • Influenza, seasonal, injectable 09/01/2016   • Pneumococcal Conjugate 13-Valent 11/22/2017   • Pneumococcal Polysaccharide PPV23 10/02/2020   • TD (adult) Preservative Free 03/08/2015     Objective   /84   Pulse (!) 106   Temp 98.2 °F (36.8 °C)   Resp 18   Ht 5' 2.5\" (1.588 m)   Wt 73 kg (161 lb)   SpO2 96%   BMI 28.98 kg/m²     Physical Exam  Constitutional:       Appearance: Normal appearance.   HENT:      " Head: Normocephalic and atraumatic.      Right Ear: Tympanic membrane normal.      Left Ear: Tympanic membrane normal.      Nose: Congestion and rhinorrhea present.      Mouth/Throat:      Mouth: Mucous membranes are moist.   Eyes:      Comments: CONJUNCTIVA INJECTED   Cardiovascular:      Rate and Rhythm: Normal rate. Rhythm irregular.   Pulmonary:      Effort: Pulmonary effort is normal.      Comments: COARSE BS  SOME SCATTERED RHONCHI  Abdominal:      General: Abdomen is flat.      Palpations: Abdomen is soft.   Musculoskeletal:         General: Normal range of motion.      Cervical back: Normal range of motion and neck supple. No rigidity or tenderness.      Comments: MILD DJD CHANGES     Skin:     General: Skin is warm.      Findings: No rash.   Neurological:      General: No focal deficit present.      Mental Status: She is alert.   Psychiatric:         Mood and Affect: Mood normal.         Thought Content: Thought content normal.

## 2025-01-06 NOTE — PATIENT INSTRUCTIONS
PLENTY FLUIDS  REST  MUCINEX  MEDICATION AS DIRECTED  IF SYMPTOMS PERSIST OR WORSEN, PLEASE CALL      BP CHECK IN 2 WEEKS

## 2025-01-14 ENCOUNTER — TELEPHONE (OUTPATIENT)
Age: 75
End: 2025-01-14

## 2025-01-14 NOTE — TELEPHONE ENCOUNTER
Pt called to ask Dr Herbert if she should continue taking the levaquin.  She said he prescribed it for her upper respiratory infection.  Her symptoms are improving and she is feeling better, but she has developed a pain in her knee, as well as some neck and shoulder pain.  She read the side effects of levaquin and joint pain was one of them.  She has two doses remaining.  Should she continue on it or stop it?  Please advise.

## 2025-01-16 ENCOUNTER — HOSPITAL ENCOUNTER (OUTPATIENT)
Dept: RADIOLOGY | Facility: HOSPITAL | Age: 75
Discharge: HOME | End: 2025-01-16
Payer: COMMERCIAL

## 2025-01-16 VITALS — BODY MASS INDEX: 28.88 KG/M2 | WEIGHT: 163 LBS | HEIGHT: 63 IN

## 2025-01-16 DIAGNOSIS — M85.89 OSTEOPENIA OF MULTIPLE SITES: ICD-10-CM

## 2025-01-16 DIAGNOSIS — Z78.0 ASYMPTOMATIC MENOPAUSE: ICD-10-CM

## 2025-01-16 DIAGNOSIS — E04.1 THYROID NODULE: ICD-10-CM

## 2025-01-16 DIAGNOSIS — Z13.820 SCREENING FOR OSTEOPOROSIS: ICD-10-CM

## 2025-01-16 PROCEDURE — 76536 US EXAM OF HEAD AND NECK: CPT

## 2025-01-16 PROCEDURE — 77080 DXA BONE DENSITY AXIAL: CPT

## 2025-01-17 ENCOUNTER — RESULTS FOLLOW-UP (OUTPATIENT)
Dept: FAMILY MEDICINE CLINIC | Facility: CLINIC | Age: 75
End: 2025-01-17

## 2025-01-22 DIAGNOSIS — E04.1 THYROID NODULE: ICD-10-CM

## 2025-01-22 DIAGNOSIS — K21.9 GASTROESOPHAGEAL REFLUX DISEASE WITHOUT ESOPHAGITIS: ICD-10-CM

## 2025-01-22 DIAGNOSIS — Z00.00 MEDICARE ANNUAL WELLNESS VISIT, SUBSEQUENT: Primary | ICD-10-CM

## 2025-01-22 DIAGNOSIS — Z13.29 SCREENING FOR THYROID DISORDER: ICD-10-CM

## 2025-01-22 DIAGNOSIS — I10 PRIMARY HYPERTENSION: ICD-10-CM

## 2025-01-22 DIAGNOSIS — Z13.220 SCREENING FOR LIPID DISORDERS: ICD-10-CM

## 2025-02-20 LAB
ALBUMIN SERPL-MCNC: 4.2 G/DL (ref 3.8–4.8)
ALP SERPL-CCNC: 78 IU/L (ref 44–121)
ALT SERPL-CCNC: 13 IU/L (ref 0–32)
AST SERPL-CCNC: 22 IU/L (ref 0–40)
BILIRUB SERPL-MCNC: 0.4 MG/DL (ref 0–1.2)
BUN SERPL-MCNC: 29 MG/DL (ref 8–27)
BUN/CREAT SERPL: 47 (ref 12–28)
CALCIUM SERPL-MCNC: 9.7 MG/DL (ref 8.7–10.3)
CHLORIDE SERPL-SCNC: 101 MMOL/L (ref 96–106)
CHOLEST SERPL-MCNC: 199 MG/DL (ref 100–199)
CHOLEST/HDLC SERPL: 2.3 RATIO (ref 0–4.4)
CO2 SERPL-SCNC: 27 MMOL/L (ref 20–29)
CREAT SERPL-MCNC: 0.62 MG/DL (ref 0.57–1)
EGFR: 95 ML/MIN/1.73
GLOBULIN SER-MCNC: 2.1 G/DL (ref 1.5–4.5)
GLUCOSE SERPL-MCNC: 85 MG/DL (ref 70–99)
HDLC SERPL-MCNC: 88 MG/DL
LDLC SERPL CALC-MCNC: 102 MG/DL (ref 0–99)
POTASSIUM SERPL-SCNC: 5 MMOL/L (ref 3.5–5.2)
PROT SERPL-MCNC: 6.3 G/DL (ref 6–8.5)
SL AMB VLDL CHOLESTEROL CALC: 9 MG/DL (ref 5–40)
SODIUM SERPL-SCNC: 139 MMOL/L (ref 134–144)
TRIGL SERPL-MCNC: 46 MG/DL (ref 0–149)
TSH SERPL DL<=0.005 MIU/L-ACNC: 3.1 UIU/ML (ref 0.45–4.5)

## 2025-02-25 ENCOUNTER — RA CDI HCC (OUTPATIENT)
Dept: OTHER | Facility: HOSPITAL | Age: 75
End: 2025-02-25

## 2025-03-04 ENCOUNTER — TELEPHONE (OUTPATIENT)
Dept: ADMINISTRATIVE | Facility: OTHER | Age: 75
End: 2025-03-04

## 2025-03-04 NOTE — TELEPHONE ENCOUNTER
03/04/25 2:11 PM    Patient contacted to bring Advance Directive, POLST, or Living Will document to next scheduled pcp visit.[facility] Department left message.  Bring in Living will to copy or ask for 5 wishes document at check in.      Thank you.  Karolyn Simmons MA  [facility]

## 2025-03-05 ENCOUNTER — OFFICE VISIT (OUTPATIENT)
Dept: FAMILY MEDICINE CLINIC | Facility: CLINIC | Age: 75
End: 2025-03-05
Payer: COMMERCIAL

## 2025-03-05 VITALS
RESPIRATION RATE: 14 BRPM | OXYGEN SATURATION: 97 % | HEIGHT: 62 IN | TEMPERATURE: 95.8 F | BODY MASS INDEX: 30.36 KG/M2 | HEART RATE: 57 BPM | DIASTOLIC BLOOD PRESSURE: 68 MMHG | SYSTOLIC BLOOD PRESSURE: 112 MMHG | WEIGHT: 165 LBS

## 2025-03-05 DIAGNOSIS — I47.10 SVT (SUPRAVENTRICULAR TACHYCARDIA) (HCC): ICD-10-CM

## 2025-03-05 DIAGNOSIS — I48.11 LONGSTANDING PERSISTENT ATRIAL FIBRILLATION (HCC): ICD-10-CM

## 2025-03-05 DIAGNOSIS — K21.9 GASTROESOPHAGEAL REFLUX DISEASE WITHOUT ESOPHAGITIS: ICD-10-CM

## 2025-03-05 DIAGNOSIS — M48.062 SPINAL STENOSIS OF LUMBAR REGION WITH NEUROGENIC CLAUDICATION: ICD-10-CM

## 2025-03-05 DIAGNOSIS — I10 PRIMARY HYPERTENSION: Primary | ICD-10-CM

## 2025-03-05 DIAGNOSIS — E04.1 THYROID NODULE: ICD-10-CM

## 2025-03-05 DIAGNOSIS — M85.89 OSTEOPENIA OF MULTIPLE SITES: ICD-10-CM

## 2025-03-05 DIAGNOSIS — Z00.00 MEDICARE ANNUAL WELLNESS VISIT, SUBSEQUENT: ICD-10-CM

## 2025-03-05 PROCEDURE — G2211 COMPLEX E/M VISIT ADD ON: HCPCS | Performed by: FAMILY MEDICINE

## 2025-03-05 PROCEDURE — 99214 OFFICE O/P EST MOD 30 MIN: CPT | Performed by: FAMILY MEDICINE

## 2025-03-05 PROCEDURE — G0439 PPPS, SUBSEQ VISIT: HCPCS | Performed by: FAMILY MEDICINE

## 2025-03-05 NOTE — PROGRESS NOTES
Name: Chiquita Murray      : 1950      MRN: [id number]  Encounter Provider: Bry Herbert MD  Encounter Date: 3/5/2025   Encounter department: [facility]    Assessment & Plan  Primary hypertension  STABLE  DENIES ANY CP, SOB, PALPITATIONS, OR HEADACHE  NOTES NO WATER RETENTION  COMPLIANT WITH MEDICATION  NO CONCERNS    - CONTINUE CURRENT TREATMENT PLAN  - MONITOR DIETARY SODIUM INTAKE  - ENCOURAGE PHYSICAL ACTIVITY  - RV 3 MONTHS         Gastroesophageal reflux disease without esophagitis  STABLE  DENIES ANY CP, INDIGESTION, OR COUGH  NOTES NO MELENA OR HEMATOCHEZIA  NO HEMATEMESIS  COMPLIANT WITH MEDICATION  NO CONCERNS    - CONTINUE CURRENT TREATMENT PLAN  - MEDICATION AS PRESCRIBED  - AVOID CAFFEINE, ALCOHOL OR SMOKING         SVT (supraventricular tachycardia) (HCC)  STABLE         Longstanding persistent atrial fibrillation (HCC)         Osteopenia of multiple sites         Spinal stenosis of lumbar region with neurogenic claudication         Thyroid nodule         Medicare annual wellness visit, subsequent      Depression Screening and Follow-up Plan: Patient was screened for depression during today's encounter. They screened negative with a PHQ-2 score of 0.      Urinary Incontinence Plan of Care: counseling topics discussed: practice Kegel (pelvic floor strengthening) exercises.       Preventive health issues were discussed with patient, and age appropriate screening tests were ordered as noted in patient's After Visit Summary. Personalized health advice and appropriate referrals for health education or preventive services given if needed, as noted in patient's After Visit Summary.    History of Present Illness     PATIENT RETURNS FOR ANNUAL WELLNESS EXAM AND ANNUAL EVALUATION OF PATIENT'S MEDICAL ISSUES    INDIVIDUAL MEDICAL ISSUES WITH THEIR CURRENT STATUS, ASSESSMENT AND PLANS ARE LISTED ABOVE             Patient Care Team:  Bry Herbert MD as PCP - General  (Family Medicine)  Priscilla Simmons MD (Gynecology)  Rozina Devine MD (Ophthalmology)  Tra Hamilton (Cardiology)  Stalin Crowley MD (Gastroenterology)  Christiane Whiting DO (Dermatology)  Gee Nolasco MD (Neurosurgery)  Aleksandr Villalobos DPM (Podiatry)    Review of Systems   Constitutional:  Negative for chills, fatigue and fever.   HENT:  Negative for congestion, ear discharge, ear pain, mouth sores, postnasal drip, sore throat and trouble swallowing.    Eyes:  Negative for pain, discharge and visual disturbance.   Respiratory:  Negative for cough, shortness of breath and wheezing.    Cardiovascular:  Negative for chest pain, palpitations and leg swelling.   Gastrointestinal:  Negative for abdominal distention, abdominal pain, blood in stool, diarrhea and nausea.   Endocrine: Negative for polydipsia, polyphagia and polyuria.   Genitourinary:  Negative for dysuria, frequency, hematuria and urgency.   Musculoskeletal:  Positive for arthralgias. Negative for gait problem and joint swelling.   Skin:  Negative for pallor and rash.   Neurological:  Negative for dizziness, syncope, speech difficulty, weakness, light-headedness, numbness and headaches.   Hematological:  Negative for adenopathy.   Psychiatric/Behavioral:  Negative for behavioral problems, confusion and sleep disturbance. The patient is not nervous/anxious.      Medical History Reviewed by provider this encounter:  Tobacco  Allergies  Meds  Problems  Med Hx  Surg Hx  Fam Hx       Annual Wellness Visit Questionnaire       Health Risk Assessment:   Patient rates overall health as good. Patient feels that their physical health rating is slightly worse. Patient is satisfied with their life. Eyesight was rated as same. Hearing was rated as same. Patient feels that their emotional and mental health rating is same. Patients states they are never, rarely angry. Patient states they are often unusually tired/fatigued. Pain experienced in the last 7 days has been none.  Patient states that she has experienced weight loss or gain in last 6 months.     Depression Screening:   PHQ-2 Score: 0      Fall Risk Screening:   In the past year, patient has experienced: no history of falling in past year      Urinary Incontinence Screening:   Patient has leaked urine accidently in the last six months.     Home Safety:  Patient does not have trouble with stairs inside or outside of their home. Patient has working smoke alarms and has working carbon monoxide detector. Home safety hazards include: none.     Nutrition:   Current diet is Regular.     Medications:   Patient is currently taking over-the-counter supplements. OTC medications include: see medication list. Patient is able to manage medications.     Activities of Daily Living (ADLs)/Instrumental Activities of Daily Living (IADLs):   Walk and transfer into and out of bed and chair?: Yes  Dress and groom yourself?: Yes    Bathe or shower yourself?: Yes    Feed yourself? Yes  Do your laundry/housekeeping?: Yes  Manage your money, pay your bills and track your expenses?: Yes  Make your own meals?: Yes    Do your own shopping?: Yes    Previous Hospitalizations:   Any hospitalizations or ED visits within the last 12 months?: No      Advance Care Planning:   Living will: Yes    Durable POA for healthcare: Yes    Advanced directive: Yes    Provider agrees with end of life decisions: Yes      Cognitive Screening:   Provider or family/friend/caregiver concerned regarding cognition?: No    PREVENTIVE SCREENINGS      Cardiovascular Screening:    General: Screening Current      Diabetes Screening:     General: Screening Current      Colorectal Cancer Screening:       Due for: Colonoscopy - Low Risk      Breast Cancer Screening:     General: Screening Current      Cervical Cancer Screening:    General: Screening Not Indicated      Osteoporosis Screening:    General: Screening Current      Abdominal Aortic Aneurysm (AAA) Screening:        General:  "Screening Not Indicated      Lung Cancer Screening:     General: Screening Not Indicated      Hepatitis C Screening:    General: Screening Current    Screening, Brief Intervention, and Referral to Treatment (SBIRT)     Screening  Typical number of drinks in a day: 0  Typical number of drinks in a week: 0  Interpretation: Low risk drinking behavior.    Single Item Drug Screening:  How often have you used an illegal drug (including marijuana) or a prescription medication for non-medical reasons in the past year? never    Single Item Drug Screen Score: 0  Interpretation: Negative screen for possible drug use disorder    Social Drivers of Health     Financial Resource Strain: Low Risk  (2/27/2024)    Overall Financial Resource Strain (CARDIA)    • Difficulty of Paying Living Expenses: Not very hard   Food Insecurity: No Food Insecurity (3/5/2025)    Hunger Vital Sign    • Worried About Running Out of Food in the Last Year: Never true    • Ran Out of Food in the Last Year: Never true   Transportation Needs: No Transportation Needs (3/5/2025)    PRAPARE - Transportation    • Lack of Transportation (Medical): No    • Lack of Transportation (Non-Medical): No   Housing Stability: Low Risk  (3/5/2025)    Housing Stability Vital Sign    • Unable to Pay for Housing in the Last Year: No    • Number of Times Moved in the Last Year: 0    • Homeless in the Last Year: No   Utilities: Not At Risk (3/5/2025)    [facility] Utilities    • Threatened with loss of utilities: No     No results found.    Objective   /68   Pulse 57   Temp (!) 95.8 °F (35.4 °C) (Temporal)   Resp 14   Ht 5' 2.25\" (1.581 m)   Wt 74.8 kg (165 lb)   SpO2 97%   BMI 29.94 kg/m²     Physical Exam  Constitutional:       General: She is not in acute distress.     Appearance: Normal appearance. She is well-developed and normal weight. She is not ill-appearing.   HENT:      Head: Normocephalic and atraumatic.      Right Ear: External ear normal.      Left Ear: " External ear normal.      Nose: Nose normal.      Mouth/Throat:      Mouth: Mucous membranes are moist.      Pharynx: No oropharyngeal exudate.   Eyes:      General: No scleral icterus.        Right eye: No discharge.         Left eye: No discharge.      Conjunctiva/sclera: Conjunctivae normal.      Pupils: Pupils are equal, round, and reactive to light.   Neck:      Thyroid: No thyromegaly.      Trachea: No tracheal deviation.   Cardiovascular:      Rate and Rhythm: Normal rate and regular rhythm.      Heart sounds: Normal heart sounds. No murmur heard.     No friction rub. No gallop.   Pulmonary:      Effort: Pulmonary effort is normal. No respiratory distress.      Breath sounds: Normal breath sounds. No wheezing or rales.   Chest:      Chest wall: No tenderness.   Abdominal:      General: Bowel sounds are normal. There is no distension.      Palpations: Abdomen is soft. There is no mass.      Tenderness: There is no abdominal tenderness. There is no guarding or rebound.      Hernia: No hernia is present.   Musculoskeletal:         General: No tenderness or deformity. Normal range of motion.      Cervical back: Normal range of motion and neck supple.   Lymphadenopathy:      Cervical: No cervical adenopathy.   Skin:     General: Skin is warm and dry.      Findings: No erythema or rash.   Neurological:      General: No focal deficit present.      Mental Status: She is alert and oriented to person, place, and time.      Cranial Nerves: No cranial nerve deficit.      Sensory: No sensory deficit.      Motor: No weakness or abnormal muscle tone.      Coordination: Coordination normal.      Gait: Gait normal.      Deep Tendon Reflexes: Reflexes normal.   Psychiatric:         Mood and Affect: Mood normal.         Behavior: Behavior normal.         Thought Content: Thought content normal.         Judgment: Judgment normal.

## 2025-03-05 NOTE — LETTER
TAINA MUÑOZ      Current Outpatient Medications:     cholecalciferol (VITAMIN D3) 1,000 units tablet, Take 1,000 Units by mouth daily, Disp: , Rfl:     cyanocobalamin (VITAMIN B-12) 100 mcg tablet, Take 100 mcg by mouth daily, Disp: , Rfl:     Eliquis 5 MG, 5 mg 2 (two) times a day , Disp: , Rfl:     EPINEPHrine (EPIPEN) 0.3 mg/0.3 mL SOAJ, INJECT 0.3 ML INTO A MUSCLE ONCE FOR 1 DOSE, Disp: 2 each, Rfl: 0    loratadine (CLARITIN) 10 mg tablet, Take 10 mg by mouth daily as needed  , Disp: , Rfl:     losartan (COZAAR) 100 MG tablet, Take 1 tablet (100 mg total) by mouth daily, Disp: 100 tablet, Rfl: 1    metoprolol tartrate (LOPRESSOR) 25 mg tablet, Take 25 mg by mouth 2 (two) times a day, Disp: , Rfl:     Multiple Vitamins-Minerals (OCUVITE ADULT FORMULA PO), Take by mouth daily, Disp: , Rfl:     Naproxen Sodium 220 MG CAPS, Take by mouth daily as needed, Disp: , Rfl:     Omega-3 Fatty Acids (Fish Oil) 1000 MG CPDR, Take by mouth, Disp: , Rfl:     pantoprazole (PROTONIX) 40 mg tablet, Take 1 tablet by mouth Daily, Disp: , Rfl:     PREMARIN vaginal cream, Insert into the vagina 2 (two) times a week , Disp: , Rfl:     calcium carbonate (OS-MEGAN) 600 MG tablet, Take 1,200 mg by mouth daily, Disp: , Rfl:       Recent Results (from the past 4 weeks)   Comprehensive metabolic panel    Collection Time: 02/20/25  8:28 AM   Result Value Ref Range    Glucose, Random 85 70 - 99 mg/dL    BUN 29 (H) 8 - 27 mg/dL    Creatinine 0.62 0.57 - 1.00 mg/dL    eGFR 95 >59 mL/min/1.73    SL AMB BUN/CREATININE RATIO 47 (H) 12 - 28    Sodium 139 134 - 144 mmol/L    Potassium 5.0 3.5 - 5.2 mmol/L    Chloride 101 96 - 106 mmol/L    CO2 27 20 - 29 mmol/L    CALCIUM 9.7 8.7 - 10.3 mg/dL    Protein, Total 6.3 6.0 - 8.5 g/dL    Albumin 4.2 3.8 - 4.8 g/dL    Globulin, Total 2.1 1.5 - 4.5 g/dL    TOTAL BILIRUBIN 0.4 0.0 - 1.2 mg/dL    Alk Phos Isoenzymes 78 44 - 121 IU/L    AST 22 0 - 40 IU/L    ALT 13 0 - 32 IU/L   Lipid panel    Collection Time:  02/20/25  8:28 AM   Result Value Ref Range    Cholesterol, Total 199 100 - 199 mg/dL    Triglycerides 46 0 - 149 mg/dL    HDL 88 >39 mg/dL    VLDL Cholesterol Calculated 9 5 - 40 mg/dL    LDL Calculated 102 (H) 0 - 99 mg/dL    T. Chol/HDL Ratio 2.3 0.0 - 4.4 ratio   TSH, 3rd generation    Collection Time: 02/20/25  8:28 AM   Result Value Ref Range    TSH 3.100 0.450 - 4.500 uIU/mL

## 2025-03-06 NOTE — PATIENT INSTRUCTIONS
DISCUSSED HEALTH ISSUES  HEALTHY DIET AND EXERCISE  BW WILL BE REVIEWED  MAMMOGRAPHY   RECOMMEND CALCIUM 0455-1613 MG DAILY  VITAMIN D3  1000 IU DAILY  RV IN 1 YEAR FOR ANNUAL EXAM, SOONER IF NEEDED    Recent Results (from the past 4 weeks)   Comprehensive metabolic panel    Collection Time: 02/20/25  8:28 AM   Result Value Ref Range    Glucose, Random 85 70 - 99 mg/dL    BUN 29 (H) 8 - 27 mg/dL    Creatinine 0.62 0.57 - 1.00 mg/dL    eGFR 95 >59 mL/min/1.73    SL AMB BUN/CREATININE RATIO 47 (H) 12 - 28    Sodium 139 134 - 144 mmol/L    Potassium 5.0 3.5 - 5.2 mmol/L    Chloride 101 96 - 106 mmol/L    CO2 27 20 - 29 mmol/L    CALCIUM 9.7 8.7 - 10.3 mg/dL    Protein, Total 6.3 6.0 - 8.5 g/dL    Albumin 4.2 3.8 - 4.8 g/dL    Globulin, Total 2.1 1.5 - 4.5 g/dL    TOTAL BILIRUBIN 0.4 0.0 - 1.2 mg/dL    Alk Phos Isoenzymes 78 44 - 121 IU/L    AST 22 0 - 40 IU/L    ALT 13 0 - 32 IU/L   Lipid panel    Collection Time: 02/20/25  8:28 AM   Result Value Ref Range    Cholesterol, Total 199 100 - 199 mg/dL    Triglycerides 46 0 - 149 mg/dL    HDL 88 >39 mg/dL    VLDL Cholesterol Calculated 9 5 - 40 mg/dL    LDL Calculated 102 (H) 0 - 99 mg/dL    T. Chol/HDL Ratio 2.3 0.0 - 4.4 ratio   TSH, 3rd generation    Collection Time: 02/20/25  8:28 AM   Result Value Ref Range    TSH 3.100 0.450 - 4.500 uIU/mL         Medicare Preventive Visit Patient Instructions  Thank you for completing your Welcome to Medicare Visit or Medicare Annual Wellness Visit today. Your next wellness visit will be due in one year (3/7/2026).  The screening/preventive services that you may require over the next 5-10 years are detailed below. Some tests may not apply to you based off risk factors and/or age. Screening tests ordered at today's visit but not completed yet may show as past due. Also, please note that scanned in results may not display below.  Preventive Screenings:  Service Recommendations Previous Testing/Comments   Colorectal Cancer Screening  *  Colonoscopy    * Fecal Occult Blood Test (FOBT)/Fecal Immunochemical Test (FIT)  * Fecal DNA/Cologuard Test  * Flexible Sigmoidoscopy Age: 45-75 years old   Colonoscopy: every 10 years (may be performed more frequently if at higher risk)  OR  FOBT/FIT: every 1 year  OR  Cologuard: every 3 years  OR  Sigmoidoscopy: every 5 years  Screening may be recommended earlier than age 45 if at higher risk for colorectal cancer. Also, an individualized decision between you and your healthcare provider will decide whether screening between the ages of 76-85 would be appropriate. Colonoscopy: 04/23/2014  FOBT/FIT: Not on file  Cologuard: Not on file  Sigmoidoscopy: Not on file    Due for Colonoscopy - Low Risk     Breast Cancer Screening Age: 40+ years old  Frequency: every 1-2 years  Not required if history of left and right mastectomy Mammogram: 05/06/2024    Screening Current   Cervical Cancer Screening Between the ages of 21-29, pap smear recommended once every 3 years.   Between the ages of 30-65, can perform pap smear with HPV co-testing every 5 years.   Recommendations may differ for women with a history of total hysterectomy, cervical cancer, or abnormal pap smears in past. Pap Smear: 11/23/2018    Screening Not Indicated   Hepatitis C Screening Once for adults born between 1945 and 1965  More frequently in patients at high risk for Hepatitis C Hep C Antibody: Not on file    Screening Current   Diabetes Screening 1-2 times per year if you're at risk for diabetes or have pre-diabetes Fasting glucose: No results in last 5 years (No results in last 5 years)  A1C: No results in last 5 years (No results in last 5 years)  Screening Current   Cholesterol Screening Once every 5 years if you don't have a lipid disorder. May order more often based on risk factors. Lipid panel: 02/20/2025    Screening Current     Other Preventive Screenings Covered by Medicare:  Abdominal Aortic Aneurysm (AAA) Screening: covered once if your at  risk. You're considered to be at risk if you have a family history of AAA.  Lung Cancer Screening: covers low dose CT scan once per year if you meet all of the following conditions: (1) Age 55-77; (2) No signs or symptoms of lung cancer; (3) Current smoker or have quit smoking within the last 15 years; (4) You have a tobacco smoking history of at least 20 pack years (packs per day multiplied by number of years you smoked); (5) You get a written order from a healthcare provider.  Glaucoma Screening: covered annually if you're considered high risk: (1) You have diabetes OR (2) Family history of glaucoma OR (3)  aged 50 and older OR (4)  American aged 65 and older  Osteoporosis Screening: covered every 2 years if you meet one of the following conditions: (1) You're estrogen deficient and at risk for osteoporosis based off medical history and other findings; (2) Have a vertebral abnormality; (3) On glucocorticoid therapy for more than 3 months; (4) Have primary hyperparathyroidism; (5) On osteoporosis medications and need to assess response to drug therapy.   Last bone density test (DXA Scan): 01/16/2025.  HIV Screening: covered annually if you're between the age of 15-65. Also covered annually if you are younger than 15 and older than 65 with risk factors for HIV infection. For pregnant patients, it is covered up to 3 times per pregnancy.    Immunizations:  Immunization Recommendations   Influenza Vaccine Annual influenza vaccination during flu season is recommended for all persons aged >= 6 months who do not have contraindications   Pneumococcal Vaccine   * Pneumococcal conjugate vaccine = PCV13 (Prevnar 13), PCV15 (Vaxneuvance), PCV20 (Prevnar 20)  * Pneumococcal polysaccharide vaccine = PPSV23 (Pneumovax) Adults 19-63 yo with certain risk factors or if 65+ yo  If never received any pneumonia vaccine: recommend Prevnar 20 (PCV20)  Give PCV20 if previously received 1 dose of PCV13 or PPSV23    Hepatitis B Vaccine 3 dose series if at intermediate or high risk (ex: diabetes, end stage renal disease, liver disease)   Respiratory syncytial virus (RSV) Vaccine - COVERED BY MEDICARE PART D  * RSVPreF3 (Arexvy) CDC recommends that adults 60 years of age and older may receive a single dose of RSV vaccine using shared clinical decision-making (SCDM)   Tetanus (Td) Vaccine - COST NOT COVERED BY MEDICARE PART B Following completion of primary series, a booster dose should be given every 10 years to maintain immunity against tetanus. Td may also be given as tetanus wound prophylaxis.   Tdap Vaccine - COST NOT COVERED BY MEDICARE PART B Recommended at least once for all adults. For pregnant patients, recommended with each pregnancy.   Shingles Vaccine (Shingrix) - COST NOT COVERED BY MEDICARE PART B  2 shot series recommended in those 19 years and older who have or will have weakened immune systems or those 50 years and older     Health Maintenance Due:      Topic Date Due   • Colorectal Cancer Screening  04/23/2024   • Breast Cancer Screening: Mammogram  05/06/2025   • Hepatitis C Screening  Addressed     Immunizations Due:      Topic Date Due   • COVID-19 Vaccine (3 - 2024-25 season) 09/01/2024     Advance Directives   What are advance directives?  Advance directives are legal documents that state your wishes and plans for medical care. These plans are made ahead of time in case you lose your ability to make decisions for yourself. Advance directives can apply to any medical decision, such as the treatments you want, and if you want to donate organs.   What are the types of advance directives?  There are many types of advance directives, and each state has rules about how to use them. You may choose a combination of any of the following:  Living will:  This is a written record of the treatment you want. You can also choose which treatments you do not want, which to limit, and which to stop at a certain time. This  includes surgery, medicine, IV fluid, and tube feedings.   Durable power of  for healthcare (DPAHC):  This is a written record that states who you want to make healthcare choices for you when you are unable to make them for yourself. This person, called a proxy, is usually a family member or a friend. You may choose more than 1 proxy.  Do not resuscitate (DNR) order:  A DNR order is used in case your heart stops beating or you stop breathing. It is a request not to have certain forms of treatment, such as CPR. A DNR order may be included in other types of advance directives.  Medical directive:  This covers the care that you want if you are in a coma, near death, or unable to make decisions for yourself. You can list the treatments you want for each condition. Treatment may include pain medicine, surgery, blood transfusions, dialysis, IV or tube feedings, and a ventilator (breathing machine).  Values history:  This document has questions about your views, beliefs, and how you feel and think about life. This information can help others choose the care that you would choose.  Why are advance directives important?  An advance directive helps you control your care. Although spoken wishes may be used, it is better to have your wishes written down. Spoken wishes can be misunderstood, or not followed. Treatments may be given even if you do not want them. An advance directive may make it easier for your family to make difficult choices about your care.   Urinary Incontinence   Urinary incontinence (UI)  is when you lose control of your bladder. UI develops because your bladder cannot store or empty urine properly. The 3 most common types of UI are stress incontinence, urge incontinence, or both.  Medicines:   May be given to help strengthen your bladder control. Report any side effects of medication to your healthcare provider.  Do pelvic muscle exercises often:  Your pelvic muscles help you stop urinating. Squeeze  these muscles tight for 5 seconds, then relax for 5 seconds. Gradually work up to squeezing for 10 seconds. Do 3 sets of 15 repetitions a day, or as directed. This will help strengthen your pelvic muscles and improve bladder control.  Train your bladder:  Go to the bathroom at set times, such as every 2 hours, even if you do not feel the urge to go. You can also try to hold your urine when you feel the urge to go. For example, hold your urine for 5 minutes when you feel the urge to go. As that becomes easier, hold your urine for 10 minutes.   Self-care:   Keep a UI record.  Write down how often you leak urine and how much you leak. Make a note of what you were doing when you leaked urine.  Drink liquids as directed. You may need to limit the amount of liquid you drink to help control your urine leakage. Do not drink any liquid right before you go to bed. Limit or do not have drinks that contain caffeine or alcohol.   Prevent constipation.  Eat a variety of high-fiber foods. Good examples are high-fiber cereals, beans, vegetables, and whole-grain breads. Walking is the best way to trigger your intestines to have a bowel movement.  Exercise regularly and maintain a healthy weight.  Weight loss and exercise will decrease pressure on your bladder and help you control your leakage.   Use a catheter as directed  to help empty your bladder. A catheter is a tiny, plastic tube that is put into your bladder to drain your urine.   Go to behavior therapy as directed.  Behavior therapy may be used to help you learn to control your urge to urinate.    Weight Management   Why it is important to manage your weight:  Being overweight increases your risk of health conditions such as heart disease, high blood pressure, type 2 diabetes, and certain types of cancer. It can also increase your risk for osteoarthritis, sleep apnea, and other respiratory problems. Aim for a slow, steady weight loss. Even a small amount of weight loss can  lower your risk of health problems.  How to lose weight safely:  A safe and healthy way to lose weight is to eat fewer calories and get regular exercise. You can lose up about 1 pound a week by decreasing the number of calories you eat by 500 calories each day.   Healthy meal plan for weight management:  A healthy meal plan includes a variety of foods, contains fewer calories, and helps you stay healthy. A healthy meal plan includes the following:  Eat whole-grain foods more often.  A healthy meal plan should contain fiber. Fiber is the part of grains, fruits, and vegetables that is not broken down by your body. Whole-grain foods are healthy and provide extra fiber in your diet. Some examples of whole-grain foods are whole-wheat breads and pastas, oatmeal, brown rice, and bulgur.  Eat a variety of vegetables every day.  Include dark, leafy greens such as spinach, kale, laura greens, and mustard greens. Eat yellow and orange vegetables such as carrots, sweet potatoes, and winter squash.   Eat a variety of fruits every day.  Choose fresh or canned fruit (canned in its own juice or light syrup) instead of juice. Fruit juice has very little or no fiber.  Eat low-fat dairy foods.  Drink fat-free (skim) milk or 1% milk. Eat fat-free yogurt and low-fat cottage cheese. Try low-fat cheeses such as mozzarella and other reduced-fat cheeses.  Choose meat and other protein foods that are low in fat.  Choose beans or other legumes such as split peas or lentils. Choose fish, skinless poultry (chicken or turkey), or lean cuts of red meat (beef or pork). Before you cook meat or poultry, cut off any visible fat.   Use less fat and oil.  Try baking foods instead of frying them. Add less fat, such as margarine, sour cream, regular salad dressing and mayonnaise to foods. Eat fewer high-fat foods. Some examples of high-fat foods include french fries, doughnuts, ice cream, and cakes.  Eat fewer sweets.  Limit foods and drinks that are  high in sugar. This includes candy, cookies, regular soda, and sweetened drinks.  Exercise:  Exercise at least 30 minutes per day on most days of the week. Some examples of exercise include walking, biking, dancing, and swimming. You can also fit in more physical activity by taking the stairs instead of the elevator or parking farther away from stores. Ask your healthcare provider about the best exercise plan for you.      © Copyright [employer] 2018 Information is for End User's use only and may not be sold, redistributed or otherwise used for commercial purposes. All illustrations and images included in CareNotes® are the copyrighted property of [employer] or [employer]

## 2025-03-19 DIAGNOSIS — I10 ESSENTIAL HYPERTENSION: ICD-10-CM

## 2025-03-19 RX ORDER — LOSARTAN POTASSIUM 100 MG/1
100 TABLET ORAL DAILY
Qty: 100 TABLET | Refills: 1 | Status: SHIPPED | OUTPATIENT
Start: 2025-03-19

## 2025-07-31 ENCOUNTER — OFFICE VISIT (OUTPATIENT)
Dept: FAMILY MEDICINE CLINIC | Facility: CLINIC | Age: 75
End: 2025-07-31
Payer: COMMERCIAL

## 2025-07-31 VITALS
WEIGHT: 168 LBS | OXYGEN SATURATION: 98 % | BODY MASS INDEX: 30.91 KG/M2 | SYSTOLIC BLOOD PRESSURE: 148 MMHG | RESPIRATION RATE: 14 BRPM | TEMPERATURE: 96.7 F | HEIGHT: 62 IN | HEART RATE: 60 BPM | DIASTOLIC BLOOD PRESSURE: 80 MMHG

## 2025-07-31 DIAGNOSIS — I10 PRIMARY HYPERTENSION: ICD-10-CM

## 2025-07-31 DIAGNOSIS — I48.11 LONGSTANDING PERSISTENT ATRIAL FIBRILLATION (HCC): Primary | ICD-10-CM

## 2025-07-31 DIAGNOSIS — M48.062 SPINAL STENOSIS OF LUMBAR REGION WITH NEUROGENIC CLAUDICATION: ICD-10-CM

## 2025-07-31 PROCEDURE — 99214 OFFICE O/P EST MOD 30 MIN: CPT | Performed by: STUDENT IN AN ORGANIZED HEALTH CARE EDUCATION/TRAINING PROGRAM

## 2025-07-31 PROCEDURE — G2211 COMPLEX E/M VISIT ADD ON: HCPCS | Performed by: STUDENT IN AN ORGANIZED HEALTH CARE EDUCATION/TRAINING PROGRAM

## 2025-08-08 ENCOUNTER — EVALUATION (OUTPATIENT)
Dept: PHYSICAL THERAPY | Facility: CLINIC | Age: 75
End: 2025-08-08
Payer: COMMERCIAL

## 2025-08-08 DIAGNOSIS — M48.062 SPINAL STENOSIS OF LUMBAR REGION WITH NEUROGENIC CLAUDICATION: ICD-10-CM

## 2025-08-08 PROCEDURE — 97161 PT EVAL LOW COMPLEX 20 MIN: CPT | Performed by: PHYSICAL THERAPIST

## 2025-08-12 ENCOUNTER — TELEPHONE (OUTPATIENT)
Age: 75
End: 2025-08-12

## 2025-08-12 ENCOUNTER — OFFICE VISIT (OUTPATIENT)
Dept: PHYSICAL THERAPY | Facility: CLINIC | Age: 75
End: 2025-08-12
Payer: COMMERCIAL

## 2025-08-14 ENCOUNTER — TELEPHONE (OUTPATIENT)
Dept: FAMILY MEDICINE CLINIC | Facility: CLINIC | Age: 75
End: 2025-08-14

## 2025-08-15 ENCOUNTER — OFFICE VISIT (OUTPATIENT)
Dept: FAMILY MEDICINE CLINIC | Facility: CLINIC | Age: 75
End: 2025-08-15
Payer: COMMERCIAL

## 2025-08-18 ENCOUNTER — TELEPHONE (OUTPATIENT)
Age: 75
End: 2025-08-18

## 2025-08-19 ENCOUNTER — OFFICE VISIT (OUTPATIENT)
Dept: PHYSICAL THERAPY | Facility: CLINIC | Age: 75
End: 2025-08-19
Payer: COMMERCIAL

## 2025-08-19 DIAGNOSIS — M48.062 SPINAL STENOSIS OF LUMBAR REGION WITH NEUROGENIC CLAUDICATION: Primary | ICD-10-CM

## 2025-08-19 PROCEDURE — 97112 NEUROMUSCULAR REEDUCATION: CPT

## 2025-08-22 ENCOUNTER — OFFICE VISIT (OUTPATIENT)
Dept: FAMILY MEDICINE CLINIC | Facility: CLINIC | Age: 75
End: 2025-08-22
Payer: COMMERCIAL

## 2025-08-22 VITALS
WEIGHT: 164 LBS | RESPIRATION RATE: 14 BRPM | BODY MASS INDEX: 30.18 KG/M2 | HEIGHT: 62 IN | HEART RATE: 82 BPM | DIASTOLIC BLOOD PRESSURE: 82 MMHG | TEMPERATURE: 97.2 F | SYSTOLIC BLOOD PRESSURE: 132 MMHG | OXYGEN SATURATION: 96 %

## 2025-08-22 DIAGNOSIS — M48.062 SPINAL STENOSIS OF LUMBAR REGION WITH NEUROGENIC CLAUDICATION: Primary | ICD-10-CM

## 2025-08-22 PROCEDURE — 96372 THER/PROPH/DIAG INJ SC/IM: CPT | Performed by: STUDENT IN AN ORGANIZED HEALTH CARE EDUCATION/TRAINING PROGRAM

## 2025-08-22 PROCEDURE — 99213 OFFICE O/P EST LOW 20 MIN: CPT | Performed by: STUDENT IN AN ORGANIZED HEALTH CARE EDUCATION/TRAINING PROGRAM

## 2025-08-22 RX ORDER — DEXAMETHASONE SODIUM PHOSPHATE 4 MG/ML
4 INJECTION, SOLUTION INTRA-ARTICULAR; INTRALESIONAL; INTRAMUSCULAR; INTRAVENOUS; SOFT TISSUE ONCE
Status: COMPLETED | OUTPATIENT
Start: 2025-08-22 | End: 2025-08-22

## 2025-08-22 RX ADMIN — DEXAMETHASONE SODIUM PHOSPHATE 4 MG: 4 INJECTION, SOLUTION INTRA-ARTICULAR; INTRALESIONAL; INTRAMUSCULAR; INTRAVENOUS; SOFT TISSUE at 10:12
